# Patient Record
Sex: FEMALE | Race: OTHER | NOT HISPANIC OR LATINO
[De-identification: names, ages, dates, MRNs, and addresses within clinical notes are randomized per-mention and may not be internally consistent; named-entity substitution may affect disease eponyms.]

---

## 2021-08-09 PROBLEM — Z00.00 ENCOUNTER FOR PREVENTIVE HEALTH EXAMINATION: Status: ACTIVE | Noted: 2021-08-09

## 2021-08-17 ENCOUNTER — APPOINTMENT (OUTPATIENT)
Dept: NEUROLOGY | Facility: CLINIC | Age: 66
End: 2021-08-17

## 2022-01-07 ENCOUNTER — EMERGENCY (EMERGENCY)
Facility: HOSPITAL | Age: 67
LOS: 1 days | Discharge: ANOTHER HOSPITAL | End: 2022-01-07
Attending: EMERGENCY MEDICINE | Admitting: EMERGENCY MEDICINE
Payer: MEDICARE

## 2022-01-07 VITALS
TEMPERATURE: 98 F | DIASTOLIC BLOOD PRESSURE: 74 MMHG | HEART RATE: 74 BPM | HEIGHT: 64 IN | SYSTOLIC BLOOD PRESSURE: 120 MMHG | WEIGHT: 139.99 LBS | OXYGEN SATURATION: 94 % | RESPIRATION RATE: 16 BRPM

## 2022-01-07 DIAGNOSIS — R41.82 ALTERED MENTAL STATUS, UNSPECIFIED: ICD-10-CM

## 2022-01-07 DIAGNOSIS — G40.909 EPILEPSY, UNSPECIFIED, NOT INTRACTABLE, WITHOUT STATUS EPILEPTICUS: ICD-10-CM

## 2022-01-07 DIAGNOSIS — U07.1 COVID-19: ICD-10-CM

## 2022-01-07 DIAGNOSIS — Z88.0 ALLERGY STATUS TO PENICILLIN: ICD-10-CM

## 2022-01-07 LAB
ALBUMIN SERPL ELPH-MCNC: 3 G/DL — LOW (ref 3.4–5)
ALP SERPL-CCNC: 124 U/L — HIGH (ref 40–120)
ALT FLD-CCNC: 31 U/L — SIGNIFICANT CHANGE UP (ref 12–42)
ANION GAP SERPL CALC-SCNC: 8 MMOL/L — LOW (ref 9–16)
APPEARANCE UR: CLEAR — SIGNIFICANT CHANGE UP
AST SERPL-CCNC: 33 U/L — SIGNIFICANT CHANGE UP (ref 15–37)
BASOPHILS # BLD AUTO: 0.02 K/UL — SIGNIFICANT CHANGE UP (ref 0–0.2)
BASOPHILS NFR BLD AUTO: 0.3 % — SIGNIFICANT CHANGE UP (ref 0–2)
BILIRUB SERPL-MCNC: 0.3 MG/DL — SIGNIFICANT CHANGE UP (ref 0.2–1.2)
BILIRUB UR-MCNC: NEGATIVE — SIGNIFICANT CHANGE UP
BUN SERPL-MCNC: 8 MG/DL — SIGNIFICANT CHANGE UP (ref 7–23)
CALCIUM SERPL-MCNC: 8.6 MG/DL — SIGNIFICANT CHANGE UP (ref 8.5–10.5)
CHLORIDE SERPL-SCNC: 108 MMOL/L — SIGNIFICANT CHANGE UP (ref 96–108)
CO2 SERPL-SCNC: 26 MMOL/L — SIGNIFICANT CHANGE UP (ref 22–31)
COLOR SPEC: YELLOW — SIGNIFICANT CHANGE UP
CREAT SERPL-MCNC: 0.48 MG/DL — LOW (ref 0.5–1.3)
DIFF PNL FLD: NEGATIVE — SIGNIFICANT CHANGE UP
EOSINOPHIL # BLD AUTO: 0.12 K/UL — SIGNIFICANT CHANGE UP (ref 0–0.5)
EOSINOPHIL NFR BLD AUTO: 2.1 % — SIGNIFICANT CHANGE UP (ref 0–6)
GLUCOSE SERPL-MCNC: 90 MG/DL — SIGNIFICANT CHANGE UP (ref 70–99)
GLUCOSE UR QL: NEGATIVE — SIGNIFICANT CHANGE UP
HCT VFR BLD CALC: 38 % — SIGNIFICANT CHANGE UP (ref 34.5–45)
HGB BLD-MCNC: 12.5 G/DL — SIGNIFICANT CHANGE UP (ref 11.5–15.5)
IMM GRANULOCYTES NFR BLD AUTO: 0.5 % — SIGNIFICANT CHANGE UP (ref 0–1.5)
KETONES UR-MCNC: NEGATIVE — SIGNIFICANT CHANGE UP
LACTATE SERPL-SCNC: 0.8 MMOL/L — SIGNIFICANT CHANGE UP (ref 0.4–2)
LEUKOCYTE ESTERASE UR-ACNC: NEGATIVE — SIGNIFICANT CHANGE UP
LYMPHOCYTES # BLD AUTO: 1.92 K/UL — SIGNIFICANT CHANGE UP (ref 1–3.3)
LYMPHOCYTES # BLD AUTO: 33 % — SIGNIFICANT CHANGE UP (ref 13–44)
MCHC RBC-ENTMCNC: 32.9 GM/DL — SIGNIFICANT CHANGE UP (ref 32–36)
MCHC RBC-ENTMCNC: 33.3 PG — SIGNIFICANT CHANGE UP (ref 27–34)
MCV RBC AUTO: 101.3 FL — HIGH (ref 80–100)
MONOCYTES # BLD AUTO: 0.49 K/UL — SIGNIFICANT CHANGE UP (ref 0–0.9)
MONOCYTES NFR BLD AUTO: 8.4 % — SIGNIFICANT CHANGE UP (ref 2–14)
NEUTROPHILS # BLD AUTO: 3.24 K/UL — SIGNIFICANT CHANGE UP (ref 1.8–7.4)
NEUTROPHILS NFR BLD AUTO: 55.7 % — SIGNIFICANT CHANGE UP (ref 43–77)
NITRITE UR-MCNC: NEGATIVE — SIGNIFICANT CHANGE UP
NRBC # BLD: 0 /100 WBCS — SIGNIFICANT CHANGE UP (ref 0–0)
PH UR: 5.5 — SIGNIFICANT CHANGE UP (ref 5–8)
PLATELET # BLD AUTO: 289 K/UL — SIGNIFICANT CHANGE UP (ref 150–400)
POTASSIUM SERPL-MCNC: 3.9 MMOL/L — SIGNIFICANT CHANGE UP (ref 3.5–5.3)
POTASSIUM SERPL-SCNC: 3.9 MMOL/L — SIGNIFICANT CHANGE UP (ref 3.5–5.3)
PROT SERPL-MCNC: 6.5 G/DL — SIGNIFICANT CHANGE UP (ref 6.4–8.2)
PROT UR-MCNC: NEGATIVE MG/DL — SIGNIFICANT CHANGE UP
RBC # BLD: 3.75 M/UL — LOW (ref 3.8–5.2)
RBC # FLD: 12.4 % — SIGNIFICANT CHANGE UP (ref 10.3–14.5)
SARS-COV-2 RNA SPEC QL NAA+PROBE: DETECTED
SODIUM SERPL-SCNC: 142 MMOL/L — SIGNIFICANT CHANGE UP (ref 132–145)
SP GR SPEC: >=1.03 — SIGNIFICANT CHANGE UP (ref 1–1.03)
TROPONIN I, HIGH SENSITIVITY RESULT: 25.4 NG/L — SIGNIFICANT CHANGE UP
UROBILINOGEN FLD QL: 0.2 E.U./DL — SIGNIFICANT CHANGE UP
WBC # BLD: 5.82 K/UL — SIGNIFICANT CHANGE UP (ref 3.8–10.5)
WBC # FLD AUTO: 5.82 K/UL — SIGNIFICANT CHANGE UP (ref 3.8–10.5)

## 2022-01-07 PROCEDURE — 71045 X-RAY EXAM CHEST 1 VIEW: CPT | Mod: 26

## 2022-01-07 PROCEDURE — 93010 ELECTROCARDIOGRAM REPORT: CPT

## 2022-01-07 PROCEDURE — 99285 EMERGENCY DEPT VISIT HI MDM: CPT

## 2022-01-07 PROCEDURE — 70450 CT HEAD/BRAIN W/O DYE: CPT | Mod: 26

## 2022-01-07 RX ORDER — SODIUM CHLORIDE 9 MG/ML
1000 INJECTION INTRAMUSCULAR; INTRAVENOUS; SUBCUTANEOUS
Refills: 0 | Status: DISCONTINUED | OUTPATIENT
Start: 2022-01-07 | End: 2022-01-11

## 2022-01-07 RX ADMIN — SODIUM CHLORIDE 80 MILLILITER(S): 9 INJECTION INTRAMUSCULAR; INTRAVENOUS; SUBCUTANEOUS at 16:13

## 2022-01-07 NOTE — ED PROVIDER NOTE - NEUROLOGICAL, MLM
awake responds to name and follows commands appropriately to move exterminates 4/5 motor all extremities

## 2022-01-07 NOTE — ED ADULT TRIAGE NOTE - CHIEF COMPLAINT QUOTE
BIBA c/o 2 seizures, approx 10-20 seconds. +pt is currently altered, as per son, pt more lethargic the past 6-7 hours. recently d/c from [place] on 12/24-1/4 for UTI. FS 98 in the field

## 2022-01-07 NOTE — ED ADULT NURSE NOTE - CHIEF COMPLAINT QUOTE
BIBA c/o 2 seizures, approx 10-20 seconds. +pt is currently altered, as per son, pt more lethargic the past 6-7 hours. recently d/c from [place] on 12/24-1/4 for UTI. FS 98 in the field

## 2022-01-07 NOTE — ED PROVIDER NOTE - OBJECTIVE STATEMENT
Triage Chief complaint: seizure   Nurse triage notes: BIBA c/o 2 seizures, approx 10-20 seconds. +pt is currently altered, as per son, pt more lethargic the past 6-7 hours. recently d/c from [place] on 12/24-1/4 for UTI. FS 98 in the field  Individuals Present- Dr. Grier (ED Attending), Pt  Vital Signs: HR: 74, BP: 120/74, Respiratory Rate in minutes: 16, Temp(F): 98.3, SpO2: 94%   _____________________________________________________ Triage Chief complaint: seizure   Nurse triage notes: BIBA c/o 2 seizures, approx 10-20 seconds. +pt is currently altered, as per son, pt more lethargic the past 6-7 hours. recently d/c from [place] on 12/24-1/4 for UTI. FS 98 in the field  Individuals Present- Dr. Grier (ED Attending), Pt  Vital Signs: HR: 74, BP: 120/74, Respiratory Rate in minutes: 16, Temp(F): 98.3, SpO2: 94%   -  history as per patient and ciera walsh  (coordinator at home where pt lives)    67 yo woman with a history of seizure disorder - who lives in an assisted living facility. according to ms walsh - pt has 2 seizure last evening and seizure this morning - promting the patient to be tranferred to [facility] for evaluation. pt is well known the [facility] health system - her epileptologist at [facility] is Sheila Jeter .    pt was admitted to [place] from 12/25 - 1/4/22 for a uti and apparently dc's home without proper seizure meds - xcopri 250mg /day clobazam 10 mg qam and 15 q pm   _____________________________________________________ Triage Chief complaint: seizure   Nurse triage notes: BIBA c/o 2 seizures, approx 10-20 seconds. +pt is currently altered, as per son, pt more lethargic the past 6-7 hours. recently d/c from [place] on 12/24-1/4 for UTI. FS 98 in the field  Individuals Present- Dr. Grier (ED Attending), Pt  Vital Signs: HR: 74, BP: 120/74, Respiratory Rate in minutes: 16, Temp(F): 98.3, SpO2: 94%   -  history as per patient and ciera walsh  (coordinator at home where pt lives)    65 yo woman with a history of seizure disorder - who lives in an assisted living facility. according to ms walsh - pt has 2 seizures last evening and seizure this morning - promting the patient to be tranferred to [facility] for evaluation. pt is well known the [facility] health system - her epileptologist at [facility] is hSeila Jeter .    pt was admitted to [place] from 12/25 - 1/4/22 for a uti and apparently dc's home without proper seizure meds - xcopri 250mg /day clobazam 10 mg qam and 15 q pm   _____________________________________________________

## 2022-01-07 NOTE — ED ADULT NURSE REASSESSMENT NOTE - NS ED NURSE REASSESS COMMENT FT1
Pt received from previous shift RN. Pt currently is A&Ox3. Was able to state name, date, location. Pt stated why she presented to the hospital.  Is aware that she is going to be transferred to the hospital. BEFAST negative.

## 2022-01-07 NOTE — ED PROVIDER NOTE - CLINICAL SUMMARY MEDICAL DECISION MAKING FREE TEXT BOX
pt remained stable throughout ed stay   pt accepted to [facility] epilepsy service by dr reilly

## 2022-01-07 NOTE — ED ADULT NURSE REASSESSMENT NOTE - NS ED NURSE REASSESS COMMENT FT1
pt informed she has covid and needs admission to another facility- pt verbalizes understanding, safety precautions in place

## 2022-01-07 NOTE — ED ADULT NURSE NOTE - OBJECTIVE STATEMENT
67 y/o female who arrives from home with a possible positive home COVID test- pt arrives altered, awake and alert, weak and unwitnessed seizure. rectal temp 99.4 pt placed on cardiac monitor- 2 IV SL in place with blood sent - pt vaccinated with moderna x 2 May 2021

## 2022-01-07 NOTE — ED PROVIDER NOTE - CARE PLAN
Principal Discharge DX:	Seizure disorder  Secondary Diagnosis:	2019 novel coronavirus disease (COVID-19)   1

## 2022-01-08 VITALS
TEMPERATURE: 98 F | SYSTOLIC BLOOD PRESSURE: 121 MMHG | RESPIRATION RATE: 16 BRPM | HEART RATE: 54 BPM | DIASTOLIC BLOOD PRESSURE: 70 MMHG | OXYGEN SATURATION: 98 %

## 2022-01-08 LAB
CULTURE RESULTS: SIGNIFICANT CHANGE UP
SPECIMEN SOURCE: SIGNIFICANT CHANGE UP

## 2022-01-13 LAB
CULTURE RESULTS: SIGNIFICANT CHANGE UP
CULTURE RESULTS: SIGNIFICANT CHANGE UP
SPECIMEN SOURCE: SIGNIFICANT CHANGE UP
SPECIMEN SOURCE: SIGNIFICANT CHANGE UP

## 2022-01-28 VITALS
RESPIRATION RATE: 16 BRPM | HEIGHT: 64 IN | DIASTOLIC BLOOD PRESSURE: 54 MMHG | HEART RATE: 54 BPM | SYSTOLIC BLOOD PRESSURE: 96 MMHG | OXYGEN SATURATION: 93 % | TEMPERATURE: 97 F

## 2022-01-28 LAB
ALBUMIN SERPL ELPH-MCNC: 3.3 G/DL — LOW (ref 3.4–5)
ALP SERPL-CCNC: 125 U/L — HIGH (ref 40–120)
ALT FLD-CCNC: 19 U/L — SIGNIFICANT CHANGE UP (ref 12–42)
ANION GAP SERPL CALC-SCNC: 8 MMOL/L — LOW (ref 9–16)
APPEARANCE UR: CLEAR — SIGNIFICANT CHANGE UP
APTT BLD: 31.6 SEC — SIGNIFICANT CHANGE UP (ref 27.5–35.5)
AST SERPL-CCNC: 30 U/L — SIGNIFICANT CHANGE UP (ref 15–37)
BASOPHILS # BLD AUTO: 0.03 K/UL — SIGNIFICANT CHANGE UP (ref 0–0.2)
BASOPHILS NFR BLD AUTO: 0.6 % — SIGNIFICANT CHANGE UP (ref 0–2)
BILIRUB SERPL-MCNC: 0.1 MG/DL — LOW (ref 0.2–1.2)
BILIRUB UR-MCNC: ABNORMAL
BUN SERPL-MCNC: 27 MG/DL — HIGH (ref 7–23)
CALCIUM SERPL-MCNC: 8.8 MG/DL — SIGNIFICANT CHANGE UP (ref 8.5–10.5)
CHLORIDE SERPL-SCNC: 109 MMOL/L — HIGH (ref 96–108)
CK SERPL-CCNC: 27 U/L — SIGNIFICANT CHANGE UP (ref 26–192)
CO2 SERPL-SCNC: 26 MMOL/L — SIGNIFICANT CHANGE UP (ref 22–31)
COLOR SPEC: YELLOW — SIGNIFICANT CHANGE UP
CREAT SERPL-MCNC: 0.57 MG/DL — SIGNIFICANT CHANGE UP (ref 0.5–1.3)
DIFF PNL FLD: NEGATIVE — SIGNIFICANT CHANGE UP
EOSINOPHIL # BLD AUTO: 0.5 K/UL — SIGNIFICANT CHANGE UP (ref 0–0.5)
EOSINOPHIL NFR BLD AUTO: 9.7 % — HIGH (ref 0–6)
ETHANOL SERPL-MCNC: <3 MG/DL — SIGNIFICANT CHANGE UP
GLUCOSE SERPL-MCNC: 89 MG/DL — SIGNIFICANT CHANGE UP (ref 70–99)
GLUCOSE UR QL: NEGATIVE — SIGNIFICANT CHANGE UP
HCT VFR BLD CALC: 41.6 % — SIGNIFICANT CHANGE UP (ref 34.5–45)
HGB BLD-MCNC: 13.4 G/DL — SIGNIFICANT CHANGE UP (ref 11.5–15.5)
IMM GRANULOCYTES NFR BLD AUTO: 0.4 % — SIGNIFICANT CHANGE UP (ref 0–1.5)
INR BLD: 1.06 — SIGNIFICANT CHANGE UP (ref 0.88–1.16)
KETONES UR-MCNC: ABNORMAL MG/DL
LACTATE SERPL-SCNC: 0.6 MMOL/L — SIGNIFICANT CHANGE UP (ref 0.4–2)
LEUKOCYTE ESTERASE UR-ACNC: NEGATIVE — SIGNIFICANT CHANGE UP
LYMPHOCYTES # BLD AUTO: 1.92 K/UL — SIGNIFICANT CHANGE UP (ref 1–3.3)
LYMPHOCYTES # BLD AUTO: 37.3 % — SIGNIFICANT CHANGE UP (ref 13–44)
MAGNESIUM SERPL-MCNC: 2.2 MG/DL — SIGNIFICANT CHANGE UP (ref 1.6–2.6)
MCHC RBC-ENTMCNC: 32.2 GM/DL — SIGNIFICANT CHANGE UP (ref 32–36)
MCHC RBC-ENTMCNC: 32.8 PG — SIGNIFICANT CHANGE UP (ref 27–34)
MCV RBC AUTO: 102 FL — HIGH (ref 80–100)
MONOCYTES # BLD AUTO: 0.52 K/UL — SIGNIFICANT CHANGE UP (ref 0–0.9)
MONOCYTES NFR BLD AUTO: 10.1 % — SIGNIFICANT CHANGE UP (ref 2–14)
NEUTROPHILS # BLD AUTO: 2.16 K/UL — SIGNIFICANT CHANGE UP (ref 1.8–7.4)
NEUTROPHILS NFR BLD AUTO: 41.9 % — LOW (ref 43–77)
NITRITE UR-MCNC: NEGATIVE — SIGNIFICANT CHANGE UP
NRBC # BLD: 0 /100 WBCS — SIGNIFICANT CHANGE UP (ref 0–0)
PH UR: 5 — SIGNIFICANT CHANGE UP (ref 5–8)
PLATELET # BLD AUTO: 167 K/UL — SIGNIFICANT CHANGE UP (ref 150–400)
POTASSIUM SERPL-MCNC: 4.2 MMOL/L — SIGNIFICANT CHANGE UP (ref 3.5–5.3)
POTASSIUM SERPL-SCNC: 4.2 MMOL/L — SIGNIFICANT CHANGE UP (ref 3.5–5.3)
PROT SERPL-MCNC: 6.9 G/DL — SIGNIFICANT CHANGE UP (ref 6.4–8.2)
PROT UR-MCNC: NEGATIVE MG/DL — SIGNIFICANT CHANGE UP
PROTHROM AB SERPL-ACNC: 12.7 SEC — SIGNIFICANT CHANGE UP (ref 10.6–13.6)
RBC # BLD: 4.08 M/UL — SIGNIFICANT CHANGE UP (ref 3.8–5.2)
RBC # FLD: 13.2 % — SIGNIFICANT CHANGE UP (ref 10.3–14.5)
SARS-COV-2 RNA SPEC QL NAA+PROBE: DETECTED
SODIUM SERPL-SCNC: 143 MMOL/L — SIGNIFICANT CHANGE UP (ref 132–145)
SP GR SPEC: 1.02 — SIGNIFICANT CHANGE UP (ref 1–1.03)
UROBILINOGEN FLD QL: 0.2 E.U./DL — SIGNIFICANT CHANGE UP
WBC # BLD: 5.15 K/UL — SIGNIFICANT CHANGE UP (ref 3.8–10.5)
WBC # FLD AUTO: 5.15 K/UL — SIGNIFICANT CHANGE UP (ref 3.8–10.5)

## 2022-01-28 PROCEDURE — 74177 CT ABD & PELVIS W/CONTRAST: CPT | Mod: 26,MH

## 2022-01-28 PROCEDURE — 71045 X-RAY EXAM CHEST 1 VIEW: CPT | Mod: 26

## 2022-01-28 PROCEDURE — 70450 CT HEAD/BRAIN W/O DYE: CPT | Mod: 26,MH

## 2022-01-28 RX ORDER — METOCLOPRAMIDE HCL 10 MG
10 TABLET ORAL ONCE
Refills: 0 | Status: COMPLETED | OUTPATIENT
Start: 2022-01-28 | End: 2022-01-28

## 2022-01-28 RX ORDER — SODIUM CHLORIDE 9 MG/ML
1000 INJECTION INTRAMUSCULAR; INTRAVENOUS; SUBCUTANEOUS ONCE
Refills: 0 | Status: COMPLETED | OUTPATIENT
Start: 2022-01-28 | End: 2022-01-28

## 2022-01-28 RX ORDER — ACETAMINOPHEN 500 MG
650 TABLET ORAL ONCE
Refills: 0 | Status: COMPLETED | OUTPATIENT
Start: 2022-01-28 | End: 2022-01-28

## 2022-01-28 RX ADMIN — Medication 650 MILLIGRAM(S): at 21:04

## 2022-01-28 RX ADMIN — SODIUM CHLORIDE 1000 MILLILITER(S): 9 INJECTION INTRAMUSCULAR; INTRAVENOUS; SUBCUTANEOUS at 19:57

## 2022-01-28 RX ADMIN — SODIUM CHLORIDE 1000 MILLILITER(S): 9 INJECTION INTRAMUSCULAR; INTRAVENOUS; SUBCUTANEOUS at 20:00

## 2022-01-28 RX ADMIN — Medication 10 MILLIGRAM(S): at 21:04

## 2022-01-28 NOTE — ED PROVIDER NOTE - UNABLE TO OBTAIN
Severe Illness/Injury History and ROS limited 2/2 inability to cooperate with remainder of history due to clinical condition/AMS.

## 2022-01-28 NOTE — ED PROVIDER NOTE - OBJECTIVE STATEMENT
65 y/o female with PMHx of epilepsy and chronic UTI as per old chart as Patient is a poor historian. Will attempt to obtain more collateral later. Patient presenting from home for 3 days of worsening confusion, headache, and generalized weakness. Some information obtained from triage note. States Patient is bed ridden and sating 93% on RA. Otherwise, history is very limited. 65 y/o female with PMHx of epilepsy and chronic UTI as per old chart as Patient is a poor historian. Will attempt to obtain more collateral later. Patient presenting from home for 3 days of worsening headache episodes, and generalized weakness. Some information obtained from triage note. States Patient has been bed ridden for the last 4-5 days (usual state is walking w aid of a walker).  Otherwise, history is very limited.

## 2022-01-28 NOTE — ED ADULT NURSE NOTE - OBJECTIVE STATEMENT
pt a&ox1 BIBA from independent living with 24H home care, c/o 3 days of worsening confusion, AMS, lethargy as per HHA. PMH chronic UTI, epilepsy. bed ridden at baseline. will continue to monitor.

## 2022-01-28 NOTE — ED PROVIDER NOTE - CONSTITUTIONAL, MLM
normal... Patient is somnolent but responds to verbal stimuli. Answers questions but in low voice. Appears acutely ill. A&O x 2.

## 2022-01-28 NOTE — ED PROVIDER NOTE - CLINICAL SUMMARY MEDICAL DECISION MAKING FREE TEXT BOX
Will do AMS workup. Exam is nonfocal. Limited history. Will attempt to obtain additional collateral later. Will follow closely. Will do weakness workup. Exam is nonfocal. Limited history. Will attempt to obtain additional collateral later. Will follow closely.

## 2022-01-28 NOTE — ED ADULT TRIAGE NOTE - CHIEF COMPLAINT QUOTE
Pt BIBA from home c/o x3 days of worsening confusion, HA and lethargy according to EMS as per HHA.  Pt is confused at baseline.  No obvious signs of deformity or injury.  No facial droop.  Pt is bed-ridden at baseline.  Pt 93% on RA, improves to 98% on 2LNC.

## 2022-01-28 NOTE — ED PROVIDER NOTE - PROGRESS NOTE DETAILS
Steve Ramon () [phone]  Neurologist: Sheila Jeter ([facility])  Brother: Rafael Crow [phone] Got more information from  and it seems that pt has been progressively weaker in the last 4-5 days. no breakthrough seizures. weak and with poor apetite. Concern that her usual state is that she walks w the aid of a walker and now is unable to perform any ADLs and has been bedridden all week. Spoke to Neuro team at [facility] and since pt is not having any seizure symptoms, state there/s no need for admission at [facility]. Upon reassessment of pt she complained of difuse abd pain. Added CT abd for completion of work up for likely admission to [facility]. Pending CT abd for final dispo. CXR - no effusion, no consolidation Signed out to me by Dr Mcdaniel pending CT abdomen, will admit afterwards. CT abdomen shows fecal impaction throughout colon. d/w Dr. Hamilton cardiology for possible symptomatic bradycardia, as per cards, does not appear to be primary. d/w Dr. Wilkins tele stepdown, [facility], d/w Dr. Merchant who accepted patient for admission rectal exam performed with WAN Fernandez present at bedside. no stool in vault to disimpact.

## 2022-01-28 NOTE — ED ADULT NURSE REASSESSMENT NOTE - NSIMPLEMENTINTERV_GEN_ALL_ED
Implemented All Fall Risk Interventions:  [place] to call system. Call bell, personal items and telephone within reach. Instruct patient to call for assistance. Room bathroom lighting operational. Non-slip footwear when patient is off stretcher. Physically safe environment: no spills, clutter or unnecessary equipment. Stretcher in lowest position, wheels locked, appropriate side rails in place. Provide visual cue, wrist band, yellow gown, etc. Monitor gait and stability. Monitor for mental status changes and reorient to person, place, and time. Review medications for side effects contributing to fall risk. Reinforce activity limits and safety measures with patient and family.

## 2022-01-28 NOTE — ED ADULT NURSE NOTE - NSIMPLEMENTINTERV_GEN_ALL_ED
Implemented All Fall Risk Interventions:  [place] to call system. Call bell, personal items and telephone within reach. Instruct patient to call for assistance. Room bathroom lighting operational. Non-slip footwear when patient is off stretcher. Physically safe environment: no spills, clutter or unnecessary equipment. Stretcher in lowest position, wheels locked, appropriate side rails in place. Provide visual cue, wrist band, yellow gown, etc. Monitor gait and stability. Monitor for mental status changes and reorient to person, place, and time. Review medications for side effects contributing to fall risk. Reinforce activity limits and safety measures with patient and family.

## 2022-01-29 ENCOUNTER — INPATIENT (INPATIENT)
Facility: HOSPITAL | Age: 67
LOS: 2 days | Discharge: HOME HEALTH | DRG: 391 | End: 2022-02-01
Attending: INTERNAL MEDICINE | Admitting: HOSPITALIST
Payer: MEDICARE

## 2022-01-29 DIAGNOSIS — R00.1 BRADYCARDIA, UNSPECIFIED: ICD-10-CM

## 2022-01-29 DIAGNOSIS — N39.0 URINARY TRACT INFECTION, SITE NOT SPECIFIED: ICD-10-CM

## 2022-01-29 DIAGNOSIS — U07.1 COVID-19: ICD-10-CM

## 2022-01-29 DIAGNOSIS — K56.41 FECAL IMPACTION: ICD-10-CM

## 2022-01-29 DIAGNOSIS — G40.909 EPILEPSY, UNSPECIFIED, NOT INTRACTABLE, WITHOUT STATUS EPILEPTICUS: ICD-10-CM

## 2022-01-29 DIAGNOSIS — R53.1 WEAKNESS: ICD-10-CM

## 2022-01-29 DIAGNOSIS — Z29.9 ENCOUNTER FOR PROPHYLACTIC MEASURES, UNSPECIFIED: ICD-10-CM

## 2022-01-29 LAB
ALBUMIN SERPL ELPH-MCNC: 3.5 G/DL — SIGNIFICANT CHANGE UP (ref 3.3–5)
ALP SERPL-CCNC: 120 U/L — SIGNIFICANT CHANGE UP (ref 40–120)
ALT FLD-CCNC: 10 U/L — SIGNIFICANT CHANGE UP (ref 10–45)
ANION GAP SERPL CALC-SCNC: 10 MMOL/L — SIGNIFICANT CHANGE UP (ref 5–17)
AST SERPL-CCNC: 17 U/L — SIGNIFICANT CHANGE UP (ref 10–40)
BASOPHILS # BLD AUTO: 0.03 K/UL — SIGNIFICANT CHANGE UP (ref 0–0.2)
BASOPHILS NFR BLD AUTO: 0.9 % — SIGNIFICANT CHANGE UP (ref 0–2)
BILIRUB SERPL-MCNC: <0.2 MG/DL — SIGNIFICANT CHANGE UP (ref 0.2–1.2)
BUN SERPL-MCNC: 14 MG/DL — SIGNIFICANT CHANGE UP (ref 7–23)
CALCIUM SERPL-MCNC: 8.2 MG/DL — LOW (ref 8.4–10.5)
CHLORIDE SERPL-SCNC: 111 MMOL/L — HIGH (ref 96–108)
CO2 SERPL-SCNC: 22 MMOL/L — SIGNIFICANT CHANGE UP (ref 22–31)
CREAT SERPL-MCNC: 0.5 MG/DL — SIGNIFICANT CHANGE UP (ref 0.5–1.3)
CULTURE RESULTS: NO GROWTH — SIGNIFICANT CHANGE UP
EOSINOPHIL # BLD AUTO: 0.34 K/UL — SIGNIFICANT CHANGE UP (ref 0–0.5)
EOSINOPHIL NFR BLD AUTO: 10 % — HIGH (ref 0–6)
GLUCOSE SERPL-MCNC: 87 MG/DL — SIGNIFICANT CHANGE UP (ref 70–99)
HCT VFR BLD CALC: 39.1 % — SIGNIFICANT CHANGE UP (ref 34.5–45)
HGB BLD-MCNC: 12.4 G/DL — SIGNIFICANT CHANGE UP (ref 11.5–15.5)
IMM GRANULOCYTES NFR BLD AUTO: 0.3 % — SIGNIFICANT CHANGE UP (ref 0–1.5)
LYMPHOCYTES # BLD AUTO: 1.11 K/UL — SIGNIFICANT CHANGE UP (ref 1–3.3)
LYMPHOCYTES # BLD AUTO: 32.6 % — SIGNIFICANT CHANGE UP (ref 13–44)
MAGNESIUM SERPL-MCNC: 1.8 MG/DL — SIGNIFICANT CHANGE UP (ref 1.6–2.6)
MCHC RBC-ENTMCNC: 31.7 GM/DL — LOW (ref 32–36)
MCHC RBC-ENTMCNC: 32.9 PG — SIGNIFICANT CHANGE UP (ref 27–34)
MCV RBC AUTO: 103.7 FL — HIGH (ref 80–100)
MONOCYTES # BLD AUTO: 0.31 K/UL — SIGNIFICANT CHANGE UP (ref 0–0.9)
MONOCYTES NFR BLD AUTO: 9.1 % — SIGNIFICANT CHANGE UP (ref 2–14)
NEUTROPHILS # BLD AUTO: 1.61 K/UL — LOW (ref 1.8–7.4)
NEUTROPHILS NFR BLD AUTO: 47.1 % — SIGNIFICANT CHANGE UP (ref 43–77)
NRBC # BLD: 0 /100 WBCS — SIGNIFICANT CHANGE UP (ref 0–0)
PHOSPHATE SERPL-MCNC: 3.2 MG/DL — SIGNIFICANT CHANGE UP (ref 2.5–4.5)
PLATELET # BLD AUTO: 147 K/UL — LOW (ref 150–400)
POTASSIUM SERPL-MCNC: 4.2 MMOL/L — SIGNIFICANT CHANGE UP (ref 3.5–5.3)
POTASSIUM SERPL-SCNC: 4.2 MMOL/L — SIGNIFICANT CHANGE UP (ref 3.5–5.3)
PROT SERPL-MCNC: 5.9 G/DL — LOW (ref 6–8.3)
RBC # BLD: 3.77 M/UL — LOW (ref 3.8–5.2)
RBC # FLD: 13.1 % — SIGNIFICANT CHANGE UP (ref 10.3–14.5)
SODIUM SERPL-SCNC: 143 MMOL/L — SIGNIFICANT CHANGE UP (ref 135–145)
SPECIMEN SOURCE: SIGNIFICANT CHANGE UP
TROPONIN I, HIGH SENSITIVITY RESULT: 10 NG/L — SIGNIFICANT CHANGE UP
WBC # BLD: 3.41 K/UL — LOW (ref 3.8–10.5)
WBC # FLD AUTO: 3.41 K/UL — LOW (ref 3.8–10.5)

## 2022-01-29 PROCEDURE — 93010 ELECTROCARDIOGRAM REPORT: CPT

## 2022-01-29 PROCEDURE — 99285 EMERGENCY DEPT VISIT HI MDM: CPT

## 2022-01-29 PROCEDURE — 99053 MED SERV 10PM-8AM 24 HR FAC: CPT

## 2022-01-29 PROCEDURE — 99223 1ST HOSP IP/OBS HIGH 75: CPT | Mod: GC

## 2022-01-29 RX ORDER — CENOBAMATE 350 MG/DAY
1 KIT ORAL
Qty: 0 | Refills: 0 | DISCHARGE

## 2022-01-29 RX ORDER — THIAMINE MONONITRATE (VIT B1) 100 MG
100 TABLET ORAL DAILY
Refills: 0 | Status: DISCONTINUED | OUTPATIENT
Start: 2022-01-29 | End: 2022-02-01

## 2022-01-29 RX ORDER — NITROFURANTOIN MACROCRYSTAL 50 MG
50 CAPSULE ORAL
Refills: 0 | Status: DISCONTINUED | OUTPATIENT
Start: 2022-01-29 | End: 2022-01-29

## 2022-01-29 RX ORDER — ROSUVASTATIN CALCIUM 5 MG/1
1 TABLET ORAL
Qty: 0 | Refills: 0 | DISCHARGE

## 2022-01-29 RX ORDER — MULTIVIT WITH MIN/MFOLATE/K2 340-15/3 G
1 POWDER (GRAM) ORAL ONCE
Refills: 0 | Status: COMPLETED | OUTPATIENT
Start: 2022-01-29 | End: 2022-01-31

## 2022-01-29 RX ORDER — CLOBAZAM 10 MG/1
1 TABLET ORAL
Qty: 0 | Refills: 0 | DISCHARGE

## 2022-01-29 RX ORDER — LACTULOSE 10 G/15ML
15 SOLUTION ORAL ONCE
Refills: 0 | Status: COMPLETED | OUTPATIENT
Start: 2022-01-29 | End: 2022-01-29

## 2022-01-29 RX ORDER — CLOBAZAM 10 MG/1
10 TABLET ORAL
Refills: 0 | Status: DISCONTINUED | OUTPATIENT
Start: 2022-01-29 | End: 2022-01-29

## 2022-01-29 RX ORDER — NITROFURANTOIN MACROCRYSTAL 50 MG
1 CAPSULE ORAL
Qty: 0 | Refills: 0 | DISCHARGE

## 2022-01-29 RX ORDER — MAGNESIUM SULFATE 500 MG/ML
1 VIAL (ML) INJECTION ONCE
Refills: 0 | Status: COMPLETED | OUTPATIENT
Start: 2022-01-29 | End: 2022-01-29

## 2022-01-29 RX ORDER — SENNA PLUS 8.6 MG/1
2 TABLET ORAL AT BEDTIME
Refills: 0 | Status: DISCONTINUED | OUTPATIENT
Start: 2022-01-29 | End: 2022-02-01

## 2022-01-29 RX ORDER — ACETAMINOPHEN 500 MG
650 TABLET ORAL EVERY 6 HOURS
Refills: 0 | Status: DISCONTINUED | OUTPATIENT
Start: 2022-01-29 | End: 2022-02-01

## 2022-01-29 RX ORDER — ATORVASTATIN CALCIUM 80 MG/1
80 TABLET, FILM COATED ORAL AT BEDTIME
Refills: 0 | Status: DISCONTINUED | OUTPATIENT
Start: 2022-01-29 | End: 2022-01-29

## 2022-01-29 RX ORDER — POLYETHYLENE GLYCOL 3350 17 G/17G
17 POWDER, FOR SOLUTION ORAL EVERY 24 HOURS
Refills: 0 | Status: DISCONTINUED | OUTPATIENT
Start: 2022-01-29 | End: 2022-02-01

## 2022-01-29 RX ORDER — LISINOPRIL 2.5 MG/1
1 TABLET ORAL
Qty: 0 | Refills: 0 | DISCHARGE

## 2022-01-29 RX ORDER — ENOXAPARIN SODIUM 100 MG/ML
40 INJECTION SUBCUTANEOUS EVERY 24 HOURS
Refills: 0 | Status: DISCONTINUED | OUTPATIENT
Start: 2022-01-29 | End: 2022-02-01

## 2022-01-29 RX ORDER — LANOLIN ALCOHOL/MO/W.PET/CERES
3 CREAM (GRAM) TOPICAL AT BEDTIME
Refills: 0 | Status: DISCONTINUED | OUTPATIENT
Start: 2022-01-29 | End: 2022-02-01

## 2022-01-29 RX ORDER — CLOBAZAM 10 MG/1
5 TABLET ORAL EVERY 24 HOURS
Refills: 0 | Status: DISCONTINUED | OUTPATIENT
Start: 2022-01-29 | End: 2022-02-01

## 2022-01-29 RX ORDER — NITROFURANTOIN MACROCRYSTAL 50 MG
50 CAPSULE ORAL EVERY 24 HOURS
Refills: 0 | Status: DISCONTINUED | OUTPATIENT
Start: 2022-01-29 | End: 2022-02-01

## 2022-01-29 RX ORDER — PREGABALIN 225 MG/1
1 CAPSULE ORAL
Qty: 0 | Refills: 0 | DISCHARGE

## 2022-01-29 RX ORDER — CLOBAZAM 10 MG/1
10 TABLET ORAL EVERY 24 HOURS
Refills: 0 | Status: DISCONTINUED | OUTPATIENT
Start: 2022-01-30 | End: 2022-02-01

## 2022-01-29 RX ORDER — PREGABALIN 225 MG/1
1000 CAPSULE ORAL DAILY
Refills: 0 | Status: DISCONTINUED | OUTPATIENT
Start: 2022-01-29 | End: 2022-02-01

## 2022-01-29 RX ORDER — CLOBAZAM 10 MG/1
3 TABLET ORAL
Qty: 0 | Refills: 0 | DISCHARGE

## 2022-01-29 RX ORDER — ATORVASTATIN CALCIUM 80 MG/1
20 TABLET, FILM COATED ORAL AT BEDTIME
Refills: 0 | Status: DISCONTINUED | OUTPATIENT
Start: 2022-01-29 | End: 2022-02-01

## 2022-01-29 RX ORDER — THIAMINE MONONITRATE (VIT B1) 100 MG
1 TABLET ORAL
Qty: 0 | Refills: 0 | DISCHARGE

## 2022-01-29 RX ADMIN — Medication 10 MILLIGRAM(S): at 16:08

## 2022-01-29 RX ADMIN — LACTULOSE 15 GRAM(S): 10 SOLUTION ORAL at 09:03

## 2022-01-29 RX ADMIN — ENOXAPARIN SODIUM 40 MILLIGRAM(S): 100 INJECTION SUBCUTANEOUS at 09:36

## 2022-01-29 RX ADMIN — Medication 50 MILLIGRAM(S): at 23:36

## 2022-01-29 RX ADMIN — Medication 100 GRAM(S): at 19:40

## 2022-01-29 RX ADMIN — ATORVASTATIN CALCIUM 20 MILLIGRAM(S): 80 TABLET, FILM COATED ORAL at 23:36

## 2022-01-29 RX ADMIN — POLYETHYLENE GLYCOL 3350 17 GRAM(S): 17 POWDER, FOR SOLUTION ORAL at 12:30

## 2022-01-29 RX ADMIN — CLOBAZAM 10 MILLIGRAM(S): 10 TABLET ORAL at 11:05

## 2022-01-29 RX ADMIN — Medication 100 MILLIGRAM(S): at 16:08

## 2022-01-29 RX ADMIN — SENNA PLUS 2 TABLET(S): 8.6 TABLET ORAL at 23:36

## 2022-01-29 RX ADMIN — CLOBAZAM 5 MILLIGRAM(S): 10 TABLET ORAL at 19:40

## 2022-01-29 NOTE — H&P ADULT - NSHPLABSRESULTS_GEN_ALL_CORE
.  LABS:                         13.4   5.15  )-----------( 167      ( 2022 17:59 )             41.6         143  |  109<H>  |  27<H>  ----------------------------<  89  4.2   |  26  |  0.57    Ca    8.8      2022 17:59  Mg     2.2         TPro  6.9  /  Alb  3.3<L>  /  TBili  0.1<L>  /  DBili  x   /  AST  30  /  ALT  19  /  AlkPhos  125<H>      PT/INR - ( 2022 17:59 )   PT: 12.7 sec;   INR: 1.06          PTT - ( 2022 17:59 )  PTT:31.6 sec  Urinalysis Basic - ( 2022 18:00 )    Color: Yellow / Appearance: Clear / S.025 / pH: x  Gluc: x / Ketone: Trace mg/dL  / Bili: Small / Urobili: 0.2 E.U./dL   Blood: x / Protein: NEGATIVE mg/dL / Nitrite: NEGATIVE   Leuk Esterase: NEGATIVE / RBC: x / WBC x   Sq Epi: x / Non Sq Epi: x / Bacteria: x      CARDIAC MARKERS ( 2022 17:59 )  x     / x     / 27 U/L / x     / x            Lactate, Blood: 0.6 mmoL/L ( @ 17:59)      RADIOLOGY, EKG & ADDITIONAL TESTS: Reviewed.

## 2022-01-29 NOTE — H&P ADULT - PROBLEM SELECTOR PLAN 6
F: s/p 2L NS  E: Replete PRN'  N: pending bedside dysphagia  DVT PPx: Lovenox  GI PPx: None    Dispo: [facility]

## 2022-01-29 NOTE — H&P ADULT - ASSESSMENT
66F with developmental delay with PMHx of epilepsy (Lennox Gastaut sydnrome?) and chronic UTI as per old chart (unable to obtain further history as both she and her son are poor historians) presents to the ED via EMS for weakness, abdominal pain and headache found to be bradycardic and fecally impacted.

## 2022-01-29 NOTE — PROGRESS NOTE ADULT - SUBJECTIVE AND OBJECTIVE BOX
Additional collateral: hx of epilepsy but significant decline over the last 1.5 years, but was looking better than ever 1 week ago (per Sheila Jeter neurologist at [facility]). Last few days w/ significant weakness, HA, back ache the last few days, also now less alert and more confused. She has had multiple episodes of decreased alertness and weakness, had been attributed to seizures/UTIs in past. Apparently patient has history of bradycardia per ED doc who spoke to someone at [facility]. Has 24 hour home care. Was prescribed prophylactic nitrofurantoin for UTIs Additional collateral: hx of epilepsy but significant decline over the last 1.5 years, but was looking better than ever 1 week ago (per Sheila Jeter neurologist at [facility]). Last few days w/ significant weakness, HA, back ache the last few days, also now less alert and more confused. She has had multiple episodes of decreased alertness and weakness, had been attributed to seizures/UTIs in past. Apparently patient has history of bradycardia per ED doc who spoke to someone at [facility]. Has 24 hour home care. Was prescribed prophylactic nitrofurantoin for UTIs    INTERVAL HPI/OVERNIGHT EVENTS:  Patient was seen and examined at bedside. Patient says she feels a little better but still weak. mentions recent     VITAL SIGNS:  T(F): 97.5 (22 @ 04:18)  HR: 49 (22 @ 04:18)  BP: 133/67 (22 @ 04:18)  RR: 18 (22 @ 04:18)  SpO2: 100% (22 @ 04:18)  Wt(kg): --    PHYSICAL EXAM:  Constitutional: Lethargic, speaking slowly in broken sentences  Head: NC/AT  Eyes: PERRL, EOMI, anicteric sclera  ENT: no nasal discharge; uvula midline, no oropharyngeal erythema or exudates; MM dry  Neck: supple; no JVD or thyromegaly  Respiratory: CTA B/L; no W/R/R, no retractions  Cardiac: +S1/S2; RRR, bradycardic   Gastrointestinal: abdomen distended; TTP diffusely, suprapubic fullness, +BSx4  Back: spine midline, no bony tenderness or step-offs; no CVAT B/L  Extremities: cool extremities, no edema  Musculoskeletal: NROM x4; no joint swelling, tenderness or erythema  Vascular: 2+ radial, femoral, DP/PT pulses B/L  Neurologic: AAOx2 (to self and year); CNII-XII grossly intact; no focal deficits  Psychiatric: Patient talks very slow and intermittently does not make sense    MEDICATIONS  (STANDING):  atorvastatin 80 milliGRAM(s) Oral at bedtime  cloBAZam 10 milliGRAM(s) Oral two times a day  enoxaparin Injectable 40 milliGRAM(s) SubCutaneous every 24 hours  magnesium citrate Oral Solution 1 Bottle Oral once  polyethylene glycol 3350 17 Gram(s) Oral every 24 hours  senna 2 Tablet(s) Oral at bedtime    MEDICATIONS  (PRN):  acetaminophen     Tablet .. 650 milliGRAM(s) Oral every 6 hours PRN Temp greater or equal to 38C (100.4F), Mild Pain (1 - 3)  melatonin 3 milliGRAM(s) Oral at bedtime PRN Insomnia      Allergies    penicillin (Anaphylaxis)    Intolerances        LABS:                        12.4   3.41  )-----------( 147      ( 2022 08:55 )             39.1         143  |  111<H>  |  14  ----------------------------<  87  4.2   |  22  |  0.50    Ca    8.2<L>      2022 08:55  Phos  3.2       Mg     1.8         TPro  5.9<L>  /  Alb  3.5  /  TBili  <0.2  /  DBili  x   /  AST  17  /  ALT  10  /  AlkPhos  120      PT/INR - ( 2022 17:59 )   PT: 12.7 sec;   INR: 1.06          PTT - ( 2022 17:59 )  PTT:31.6 sec  Urinalysis Basic - ( 2022 18:00 )    Color: Yellow / Appearance: Clear / S.025 / pH: x  Gluc: x / Ketone: Trace mg/dL  / Bili: Small / Urobili: 0.2 E.U./dL   Blood: x / Protein: NEGATIVE mg/dL / Nitrite: NEGATIVE   Leuk Esterase: NEGATIVE / RBC: x / WBC x   Sq Epi: x / Non Sq Epi: x / Bacteria: x        RADIOLOGY & ADDITIONAL TESTS:  Reviewed

## 2022-01-29 NOTE — H&P ADULT - PROBLEM SELECTOR PLAN 3
Patient with abdominal pain, found to have fecal impaction in colon. Nothing in rectal vault to disimpact.   - Ordered for tap water enema, magnesium citrate and lactulose

## 2022-01-29 NOTE — H&P ADULT - PROBLEM SELECTOR PLAN 4
Patient has hx of chronic UTI. Retaining urine today, UA wnl.   - Suprapubic fullness today with fecal impaction, found to be retaining 800cc urine, s/p straight cath

## 2022-01-29 NOTE — H&P ADULT - NSHPPHYSICALEXAM_GEN_ALL_CORE
.  VITAL SIGNS:  T(C): 36.4 (01-29-22 @ 04:18), Max: 36.9 (01-28-22 @ 17:56)  T(F): 97.5 (01-29-22 @ 04:18), Max: 98.5 (01-28-22 @ 17:56)  HR: 49 (01-29-22 @ 04:18) (48 - 54)  BP: 133/67 (01-29-22 @ 04:18) (92/55 - 143/78)  BP(mean): --  RR: 18 (01-29-22 @ 04:18) (15 - 18)  SpO2: 100% (01-29-22 @ 04:18) (93% - 100%)  Wt(kg): --    PHYSICAL EXAM:    Constitutional: Resting comfortably in bed; NAD  Head: NC/AT  Eyes: PERRL, EOMI, anicteric sclera  ENT: no nasal discharge; uvula midline, no oropharyngeal erythema or exudates; MMM  Neck: supple; no JVD or thyromegaly  Respiratory: CTA B/L; no W/R/R, no retractions  Cardiac: +S1/S2; RRR; no M/R/G; PMI non-displaced  Gastrointestinal: abdomen soft, NT/ND; no rebound or guarding; +BSx4  Genitourinary: normal external genitalia  Back: spine midline, no bony tenderness or step-offs; no CVAT B/L  Extremities: WWP, no clubbing or cyanosis; no peripheral edema  Musculoskeletal: NROM x4; no joint swelling, tenderness or erythema  Vascular: 2+ radial, femoral, DP/PT pulses B/L  Dermatologic: skin warm, dry and intact; no rashes, wounds, or scars  Lymphatic: no submandibular or cervical LAD  Neurologic: AAOx3; CNII-XII grossly intact; no focal deficits  Psychiatric: affect and characteristics of appearance, verbalizations, behaviors are appropriate .  VITAL SIGNS:  T(C): 36.4 (01-29-22 @ 04:18), Max: 36.9 (01-28-22 @ 17:56)  T(F): 97.5 (01-29-22 @ 04:18), Max: 98.5 (01-28-22 @ 17:56)  HR: 49 (01-29-22 @ 04:18) (48 - 54)  BP: 133/67 (01-29-22 @ 04:18) (92/55 - 143/78)  BP(mean): --  RR: 18 (01-29-22 @ 04:18) (15 - 18)  SpO2: 100% (01-29-22 @ 04:18) (93% - 100%)  Wt(kg): --    PHYSICAL EXAM:    Constitutional: Lethargic, speaking slowly in broken sentences  Head: NC/AT  Eyes: PERRL, EOMI, anicteric sclera  ENT: no nasal discharge; uvula midline, no oropharyngeal erythema or exudates; MM dry  Neck: supple; no JVD or thyromegaly  Respiratory: CTA B/L; no W/R/R, no retractions  Cardiac: +S1/S2; RRR, bradycardic   Gastrointestinal: abdomen distended; TTP diffusely, suprapubic fullness, +BSx4  Back: spine midline, no bony tenderness or step-offs; no CVAT B/L  Extremities: cool extremities, no edema  Musculoskeletal: NROM x4; no joint swelling, tenderness or erythema  Vascular: 2+ radial, femoral, DP/PT pulses B/L  Neurologic: AAOx2 (to self and year); CNII-XII grossly intact; no focal deficits  Psychiatric: Patient talks very slow and intermittently does not make sense

## 2022-01-29 NOTE — PROGRESS NOTE ADULT - PROBLEM SELECTOR PLAN 3
Patient with abdominal pain, found to have fecal impaction in colon. Nothing in rectal vault to disimpact.   Ordered for tap water enema, magnesium citrate and lactulose, patient refused mag citrate. Had small BM on abraham this AM after enema  -start senna/miralax  -c/w lactulose and mag citrate? Patient with abdominal pain, found to have fecal impaction in colon. Nothing in rectal vault to disimpact.   Ordered for tap water enema, magnesium citrate and lactulose, patient refused mag citrate. Had small BM on abraham this AM after enema  -start senna/miralax  -s/p one time lactulose, dulcolax

## 2022-01-29 NOTE — H&P ADULT - ATTENDING COMMENTS
Patient discussed with resident team and plan of care reviewed. I have personally reviewed all pertinent labs and imaging and performed an independent history and physical. Resident note personally reviewed, and I agree with above resident note with the following additions:    66YOF with history of developmental delay, epilepsy/Lennox-Gastaut, recurrent UTI admitted for weakness, abdominal pain, and headache found to have fecal impaction, sinus bradycardia 40s-50s, urinary retention, and incidentally COVID19 positive.    Problem list:  Generalized weakness/FTT  Fecal impaction  Urinary retention  Sinus bradycardia  COVID19 - asymptomatic - first positive 1/7  Lennox-Gastaut syndrome  Developmental delay  Recurrent UTIs    Infectious workup negative, will have PT evaluate. Perhaps weakness/FTT related to fecal impaction - nothing in rectal vault to disimpact from overnight; had small BM with tap water enema but will continue aggressive bowel regimen. Urinary retention and fecal impaction likely related as well. Will do usual workup with B12, TSH, etc. COVID19 first positive 1/7 and does not need isolation.

## 2022-01-29 NOTE — ED ADULT NURSE REASSESSMENT NOTE - NS ED NURSE REASSESS COMMENT FT1
Pt noted to be hypotensive. MD made aware. 1L NS to be administered. repeat ekg ordered.
Received Pt from previous RN lying on stretcher asleep, breathing without issue on NC, IV site is patent, CCM in place. Awaiting bed status.

## 2022-01-29 NOTE — H&P ADULT - PROBLEM SELECTOR PLAN 5
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 Per her son, she "goes limp" and is atonic when she is having a seizure.   - Takes Clobazam 10mg bid at home  - Takes xcopri 200mg in AM and 50mg in PM

## 2022-01-29 NOTE — PROGRESS NOTE ADULT - PROBLEM SELECTOR PLAN 6
F: s/p 2L NS  E: Replete PRN  N: regular diet  DVT PPx: Lovenox  GI PPx: None    Dispo: [facility] Patient positive for COVID, however has no symptoms and was also positive >2 weeks ago  -no need for airborne precautions  -no treatment necessary

## 2022-01-29 NOTE — H&P ADULT - PROBLEM SELECTOR PLAN 2
Patient with weakness for past few days. Bradycardic on presentation. Unclear if postictal, however, no witnessed seizure.   - CT Head negative  - PT consulted in AM  - F/u TSH Patient presented to the ED with weakness and abdominal pain, found to be bradycardic with HR 48-51. Initially cardiology was consulted by ED, who said it was not primary. Patient appears lethargic. Patient on metoprolol 25mg at home. On previous admission to ED on 1/7, patient's HR was 60-70. Currently hemodynamically stable. EKG showing sinus bradycardia.   - F/u TSH  - Lactate wnl, patient does not appear to be hypoperfusing  - Holding toprol 25 at this time

## 2022-01-29 NOTE — H&P ADULT - HISTORY OF PRESENT ILLNESS
67 y/o female with PMHx of epilepsy and chronic UTI as per old chart as Patient is a poor historian. Will attempt to obtain more collateral later. Patient presenting from home for 3 days of worsening headache episodes, and generalized weakness. Some information obtained from triage note. States Patient has been bed ridden for the last 4-5 days (usual state is walking w aid of a walker).  Otherwise, history is very limited.    In the ED:  Initial vital signs: T: 97.4F, HR: 49, BP: 125/56, R: 16, SpO2: 98% on RA -->93% then 98% on 2L NC  ED course:   Labs: significant for , Cl 109, , COVID +  Imaging:  CTH: No acute intracranial hemorrhage, mass effect, or CT evidence of recent transcortical infarction.   CT A/P: 1. There is fecal impaction throughout the colon. Relative wall thickening involving the stomach may be due to under distension. There is no bowel obstruction or pneumoperitoneum . 2. Dependent atelectasis noted in the mid and lower lung fields with small bilateral pleural effusions. Correlate with chest imaging.  EKG: sinus alexander  Medications: Tylenol 650mg x1, Levaquin 750 x1, Reglan 10mg x1, 2L NS  Consults: none  66F with developmental delay with PMHx of epilepsy and chronic UTI as per old chart (unable to obtain further history as both she and her son are poor historians) . Patient presenting from home for 3 days of worsening headache episodes, and generalized weakness. Some information obtained from triage note. States Patient has been bed ridden for the last 4-5 days (usual state is walking w aid of a walker).  Otherwise, history is very limited.    In the ED:  Initial vital signs: T: 97.4F, HR: 49, BP: 125/56, R: 16, SpO2: 98% on RA -->93% then 98% on 2L NC  ED course:   Labs: significant for , Cl 109, , COVID +  Imaging:  CTH: No acute intracranial hemorrhage, mass effect, or CT evidence of recent transcortical infarction.   CT A/P: 1. There is fecal impaction throughout the colon. Relative wall thickening involving the stomach may be due to under distension. There is no bowel obstruction or pneumoperitoneum . 2. Dependent atelectasis noted in the mid and lower lung fields with small bilateral pleural effusions. Correlate with chest imaging.  EKG: sinus alexander  Medications: Tylenol 650mg x1, Levaquin 750 x1, Reglan 10mg x1, 2L NS  Consults: none  66F with developmental delay with PMHx of epilepsy (Lennox Gastaut sydnrome?) and chronic UTI as per old chart (unable to obtain further history as both she and her son are poor historians) presents to the ED via EMS for weakness, abdominal pain and headache. She is coming from an assisted living facility. Per triage note, patient has been having worsening headache and weakness for 3 days and has been bed ridden for 4-5 days, despite usually walking with walker. Currently patient states she has lower abdominal pain and HA. She has not had a BM in 3 days. Denies fevers, chills, cp, SOB, dysuria.     In the ED:  Initial vital signs: T: 97.4F, HR: 49, BP: 125/56, R: 16, SpO2: 98% on RA -->93% then 98% on 2L NC  ED course:   Labs: significant for , Cl 109, , COVID +  Imaging:  CTH: No acute intracranial hemorrhage, mass effect, or CT evidence of recent transcortical infarction.   CT A/P: 1. There is fecal impaction throughout the colon. Relative wall thickening involving the stomach may be due to under distension. There is no bowel obstruction or pneumoperitoneum . 2. Dependent atelectasis noted in the mid and lower lung fields with small bilateral pleural effusions. Correlate with chest imaging.  EKG: sinus alexander  Medications: Tylenol 650mg x1, Levaquin 750 x1, Reglan 10mg x1, 2L NS  Consults: none

## 2022-01-29 NOTE — H&P ADULT - NSHPSOCIALHISTORY_GEN_ALL_CORE
Lives in assisted living facility with son. He takes care of her as well as 24/7 aides. Spoon fed for meals.

## 2022-01-29 NOTE — PROGRESS NOTE ADULT - PROBLEM SELECTOR PLAN 5
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 Per her son, she "goes limp" and is atonic when she is having a seizure.   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 Per her son, she "goes limp" and is atonic when she is having a seizure.   - Takes Clobazam 10mg in AM 15mg in PM at home  - Takes xcopri 200mg in AM and 50mg in PM- try and get meds from son today, we do not have  ---spoke to epilepsy at [facility], said no need to start anything else for now if cant get xcopri

## 2022-01-29 NOTE — H&P ADULT - PROBLEM SELECTOR PLAN 1
Patient presented to the ED with weakness and abdominal pain, found to be bradycardic with HR 48-51. Initially cardiology was consulted by ED, who said it was not primary. Patient appears lethargic. Patient on metoprolol 25mg at home. On previous admission to ED on 1/7, patient's HR was 60-70. Currently hemodynamically stable. EKG showing sinus bradycardia.   - F/u TSH  - Lactate wnl, patient does not appear to be hypoperfusing  - Holding toprol 25 at this time Patient with weakness for past few days. Bradycardic on presentation. Unclear if postictal, however, no witnessed seizure. Patient does not appear infected. CTH negative. Global weakness.    - Needs collateral to assess baseline  - CT Head negative  - PT consulted in AM  - F/u TSH, b12, folate  - F/u RPR  - Placed on fall precautions and aspiration precautions  - Passed bedside dysphagia

## 2022-01-29 NOTE — PATIENT PROFILE ADULT - FALL HARM RISK - HARM RISK INTERVENTIONS

## 2022-01-30 LAB
ALBUMIN SERPL ELPH-MCNC: 3.3 G/DL — SIGNIFICANT CHANGE UP (ref 3.3–5)
ALP SERPL-CCNC: 112 U/L — SIGNIFICANT CHANGE UP (ref 40–120)
ALT FLD-CCNC: 8 U/L — LOW (ref 10–45)
ANION GAP SERPL CALC-SCNC: 9 MMOL/L — SIGNIFICANT CHANGE UP (ref 5–17)
AST SERPL-CCNC: 15 U/L — SIGNIFICANT CHANGE UP (ref 10–40)
BASOPHILS # BLD AUTO: 0.02 K/UL — SIGNIFICANT CHANGE UP (ref 0–0.2)
BASOPHILS NFR BLD AUTO: 0.5 % — SIGNIFICANT CHANGE UP (ref 0–2)
BILIRUB SERPL-MCNC: 0.2 MG/DL — SIGNIFICANT CHANGE UP (ref 0.2–1.2)
BUN SERPL-MCNC: 11 MG/DL — SIGNIFICANT CHANGE UP (ref 7–23)
CALCIUM SERPL-MCNC: 8.8 MG/DL — SIGNIFICANT CHANGE UP (ref 8.4–10.5)
CHLORIDE SERPL-SCNC: 109 MMOL/L — HIGH (ref 96–108)
CO2 SERPL-SCNC: 24 MMOL/L — SIGNIFICANT CHANGE UP (ref 22–31)
CREAT SERPL-MCNC: 0.54 MG/DL — SIGNIFICANT CHANGE UP (ref 0.5–1.3)
EOSINOPHIL # BLD AUTO: 0.42 K/UL — SIGNIFICANT CHANGE UP (ref 0–0.5)
EOSINOPHIL NFR BLD AUTO: 10.1 % — HIGH (ref 0–6)
GLUCOSE SERPL-MCNC: 78 MG/DL — SIGNIFICANT CHANGE UP (ref 70–99)
HCT VFR BLD CALC: 38.6 % — SIGNIFICANT CHANGE UP (ref 34.5–45)
HCV AB S/CO SERPL IA: 0.05 S/CO — SIGNIFICANT CHANGE UP
HCV AB SERPL-IMP: SIGNIFICANT CHANGE UP
HGB BLD-MCNC: 12 G/DL — SIGNIFICANT CHANGE UP (ref 11.5–15.5)
IMM GRANULOCYTES NFR BLD AUTO: 0.2 % — SIGNIFICANT CHANGE UP (ref 0–1.5)
LYMPHOCYTES # BLD AUTO: 1.31 K/UL — SIGNIFICANT CHANGE UP (ref 1–3.3)
LYMPHOCYTES # BLD AUTO: 31.4 % — SIGNIFICANT CHANGE UP (ref 13–44)
MAGNESIUM SERPL-MCNC: 1.9 MG/DL — SIGNIFICANT CHANGE UP (ref 1.6–2.6)
MCHC RBC-ENTMCNC: 31.1 GM/DL — LOW (ref 32–36)
MCHC RBC-ENTMCNC: 31.7 PG — SIGNIFICANT CHANGE UP (ref 27–34)
MCV RBC AUTO: 102.1 FL — HIGH (ref 80–100)
MONOCYTES # BLD AUTO: 0.35 K/UL — SIGNIFICANT CHANGE UP (ref 0–0.9)
MONOCYTES NFR BLD AUTO: 8.4 % — SIGNIFICANT CHANGE UP (ref 2–14)
NEUTROPHILS # BLD AUTO: 2.06 K/UL — SIGNIFICANT CHANGE UP (ref 1.8–7.4)
NEUTROPHILS NFR BLD AUTO: 49.4 % — SIGNIFICANT CHANGE UP (ref 43–77)
NRBC # BLD: 0 /100 WBCS — SIGNIFICANT CHANGE UP (ref 0–0)
PHOSPHATE SERPL-MCNC: 2.7 MG/DL — SIGNIFICANT CHANGE UP (ref 2.5–4.5)
PLATELET # BLD AUTO: 151 K/UL — SIGNIFICANT CHANGE UP (ref 150–400)
POTASSIUM SERPL-MCNC: 3.7 MMOL/L — SIGNIFICANT CHANGE UP (ref 3.5–5.3)
POTASSIUM SERPL-SCNC: 3.7 MMOL/L — SIGNIFICANT CHANGE UP (ref 3.5–5.3)
PROT SERPL-MCNC: 5.7 G/DL — LOW (ref 6–8.3)
RBC # BLD: 3.78 M/UL — LOW (ref 3.8–5.2)
RBC # FLD: 12.6 % — SIGNIFICANT CHANGE UP (ref 10.3–14.5)
SODIUM SERPL-SCNC: 142 MMOL/L — SIGNIFICANT CHANGE UP (ref 135–145)
T4 AB SER-ACNC: 5.06 UG/DL — SIGNIFICANT CHANGE UP (ref 4.5–11.7)
TSH SERPL-MCNC: 2.29 UIU/ML — SIGNIFICANT CHANGE UP (ref 0.27–4.2)
WBC # BLD: 4.17 K/UL — SIGNIFICANT CHANGE UP (ref 3.8–10.5)
WBC # FLD AUTO: 4.17 K/UL — SIGNIFICANT CHANGE UP (ref 3.8–10.5)

## 2022-01-30 PROCEDURE — 99233 SBSQ HOSP IP/OBS HIGH 50: CPT

## 2022-01-30 RX ORDER — POTASSIUM CHLORIDE 20 MEQ
40 PACKET (EA) ORAL ONCE
Refills: 0 | Status: COMPLETED | OUTPATIENT
Start: 2022-01-30 | End: 2022-01-30

## 2022-01-30 RX ORDER — MAGNESIUM SULFATE 500 MG/ML
1 VIAL (ML) INJECTION ONCE
Refills: 0 | Status: COMPLETED | OUTPATIENT
Start: 2022-01-30 | End: 2022-01-30

## 2022-01-30 RX ADMIN — PREGABALIN 1000 MICROGRAM(S): 225 CAPSULE ORAL at 13:46

## 2022-01-30 RX ADMIN — Medication 50 MILLIGRAM(S): at 18:21

## 2022-01-30 RX ADMIN — SENNA PLUS 2 TABLET(S): 8.6 TABLET ORAL at 22:20

## 2022-01-30 RX ADMIN — ATORVASTATIN CALCIUM 20 MILLIGRAM(S): 80 TABLET, FILM COATED ORAL at 22:21

## 2022-01-30 RX ADMIN — Medication 100 MILLIGRAM(S): at 13:47

## 2022-01-30 RX ADMIN — Medication 40 MILLIEQUIVALENT(S): at 13:46

## 2022-01-30 RX ADMIN — CLOBAZAM 5 MILLIGRAM(S): 10 TABLET ORAL at 18:21

## 2022-01-30 RX ADMIN — ENOXAPARIN SODIUM 40 MILLIGRAM(S): 100 INJECTION SUBCUTANEOUS at 07:44

## 2022-01-30 RX ADMIN — POLYETHYLENE GLYCOL 3350 17 GRAM(S): 17 POWDER, FOR SOLUTION ORAL at 13:46

## 2022-01-30 RX ADMIN — CLOBAZAM 10 MILLIGRAM(S): 10 TABLET ORAL at 06:35

## 2022-01-30 RX ADMIN — Medication 100 GRAM(S): at 13:46

## 2022-01-30 NOTE — PROGRESS NOTE ADULT - SUBJECTIVE AND OBJECTIVE BOX
Subjective/Interval events:  No acute overnight events. Pleasant but somewhat confused appearing. Answers questions appropriately, just disoriented (AAOx person only), denies pain, SOB, dizziness, lightheadedness.     MEDICATIONS  (STANDING):  atorvastatin 20 milliGRAM(s) Oral at bedtime  cloBAZam 10 milliGRAM(s) Oral every 24 hours  cloBAZam 5 milliGRAM(s) Oral every 24 hours  cyanocobalamin 1000 MICROGram(s) Oral daily  enoxaparin Injectable 40 milliGRAM(s) SubCutaneous every 24 hours  magnesium citrate Oral Solution 1 Bottle Oral once  nitrofurantoin macrocrystals (MACRODANTIN) 50 milliGRAM(s) Oral every 24 hours  polyethylene glycol 3350 17 Gram(s) Oral every 24 hours  senna 2 Tablet(s) Oral at bedtime  thiamine 100 milliGRAM(s) Oral daily    MEDICATIONS  (PRN):  acetaminophen     Tablet .. 650 milliGRAM(s) Oral every 6 hours PRN Temp greater or equal to 38C (100.4F), Mild Pain (1 - 3)  melatonin 3 milliGRAM(s) Oral at bedtime PRN Insomnia      Vital Signs Last 24 Hrs  T(C): 36.5 (30 Jan 2022 12:10), Max: 37.1 (29 Jan 2022 20:48)  T(F): 97.7 (30 Jan 2022 12:10), Max: 98.8 (29 Jan 2022 20:48)  HR: 56 (30 Jan 2022 12:10) (54 - 58)  BP: 135/72 (30 Jan 2022 12:10) (118/54 - 147/76)  BP(mean): --  RR: 18 (30 Jan 2022 12:10) (18 - 19)  SpO2: 96% (30 Jan 2022 12:10) (96% - 97%)    PHYSICAL EXAM:  GENERAL: thin appearing, pleasant, appropriate, no acute distress. Participating appropriately in interview, though appears confused/oriented only to person.  HEENT - NC/AT, EOMI, PERLLA, oropharynx clear without exudate or erythema, moist mucus membranes  NECK: Soft, supple, No JVD, no lymphadenopathy  CHEST/LUNG: Clear to auscultation bilaterally; No rales, rhonchi, wheezing. Normal work of breathing, not tachypneic  HEART: Regular rate and rhythm; No murmurs, rubs, or gallops, normal s1/s2. Warm and well-perfused  ABDOMEN: Soft, Nontender, Nondistended. Normoactive bowel sounds.  EXTREMITIES:  2+ Peripheral Pulses, No clubbing, cyanosis, or edema  NEURO:  awake, alert, oriented to person only. Cranial nerves intact, no motor or sensory deficits. Moves all extremities.  SKIN: No rashes or lesions    LABS:  01-30    142  |  109  |  11  ----------------------------<  78  3.7   |  24  |  0.54    Ca    8.8      30 Jan 2022 08:19  Phos  2.7     01-30  Mg     1.9     01-30    TPro  5.7  /  Alb  3.3  /  TBili  0.2  /  DBili  x   /  AST  15  /  ALT  8   /  AlkPhos  112  01-30                          12.0   4.17  )-----------( 151      ( 30 Jan 2022 08:19 )             38.6       Culture - Blood (collected 29 Jan 2022 01:46)  Source: .Blood Blood  Preliminary Report (30 Jan 2022 02:02):    No growth to date.    Culture - Blood (collected 29 Jan 2022 01:46)  Source: .Blood Blood  Preliminary Report (30 Jan 2022 02:02):    No growth to date.    Culture - Urine (collected 29 Jan 2022 00:30)  Source: Clean Catch Clean Catch (Midstream)  Final Report (29 Jan 2022 20:33):    No growth      COVID-19 PCR: Detected (01-28-22 @ 17:59)  COVID-19 PCR: Detected (01-07-22 @ 16:53)      RADIOLOGY & ADDITIONAL TESTS:  reviewed, none new Subjective/Interval events:  No acute overnight events. Reportedly had BM overnight. Pleasant but somewhat confused appearing. Answers questions appropriately, just disoriented (AAOx person only), denies pain, SOB, dizziness, lightheadedness.     MEDICATIONS  (STANDING):  atorvastatin 20 milliGRAM(s) Oral at bedtime  cloBAZam 10 milliGRAM(s) Oral every 24 hours  cloBAZam 5 milliGRAM(s) Oral every 24 hours  cyanocobalamin 1000 MICROGram(s) Oral daily  enoxaparin Injectable 40 milliGRAM(s) SubCutaneous every 24 hours  magnesium citrate Oral Solution 1 Bottle Oral once  nitrofurantoin macrocrystals (MACRODANTIN) 50 milliGRAM(s) Oral every 24 hours  polyethylene glycol 3350 17 Gram(s) Oral every 24 hours  senna 2 Tablet(s) Oral at bedtime  thiamine 100 milliGRAM(s) Oral daily    MEDICATIONS  (PRN):  acetaminophen     Tablet .. 650 milliGRAM(s) Oral every 6 hours PRN Temp greater or equal to 38C (100.4F), Mild Pain (1 - 3)  melatonin 3 milliGRAM(s) Oral at bedtime PRN Insomnia      Vital Signs Last 24 Hrs  T(C): 36.5 (30 Jan 2022 12:10), Max: 37.1 (29 Jan 2022 20:48)  T(F): 97.7 (30 Jan 2022 12:10), Max: 98.8 (29 Jan 2022 20:48)  HR: 56 (30 Jan 2022 12:10) (54 - 58)  BP: 135/72 (30 Jan 2022 12:10) (118/54 - 147/76)  BP(mean): --  RR: 18 (30 Jan 2022 12:10) (18 - 19)  SpO2: 96% (30 Jan 2022 12:10) (96% - 97%)    PHYSICAL EXAM:  GENERAL: thin appearing, pleasant, appropriate, no acute distress. Participating appropriately in interview, though appears confused/oriented only to person.  HEENT - NC/AT, EOMI, PERLLA, oropharynx clear without exudate or erythema, moist mucus membranes  NECK: Soft, supple, No JVD, no lymphadenopathy  CHEST/LUNG: Clear to auscultation bilaterally; No rales, rhonchi, wheezing. Normal work of breathing, not tachypneic  HEART: Regular rate and rhythm; No murmurs, rubs, or gallops, normal s1/s2. Warm and well-perfused  ABDOMEN: Soft, Nontender, Nondistended. Normoactive bowel sounds.  EXTREMITIES:  2+ Peripheral Pulses, No clubbing, cyanosis, or edema  NEURO:  awake, alert, oriented to person only. Cranial nerves intact, no motor or sensory deficits. Moves all extremities.  SKIN: No rashes or lesions    LABS:  01-30    142  |  109  |  11  ----------------------------<  78  3.7   |  24  |  0.54    Ca    8.8      30 Jan 2022 08:19  Phos  2.7     01-30  Mg     1.9     01-30    TPro  5.7  /  Alb  3.3  /  TBili  0.2  /  DBili  x   /  AST  15  /  ALT  8   /  AlkPhos  112  01-30                          12.0   4.17  )-----------( 151      ( 30 Jan 2022 08:19 )             38.6       Culture - Blood (collected 29 Jan 2022 01:46)  Source: .Blood Blood  Preliminary Report (30 Jan 2022 02:02):    No growth to date.    Culture - Blood (collected 29 Jan 2022 01:46)  Source: .Blood Blood  Preliminary Report (30 Jan 2022 02:02):    No growth to date.    Culture - Urine (collected 29 Jan 2022 00:30)  Source: Clean Catch Clean Catch (Midstream)  Final Report (29 Jan 2022 20:33):    No growth      COVID-19 PCR: Detected (01-28-22 @ 17:59)  COVID-19 PCR: Detected (01-07-22 @ 16:53)      RADIOLOGY & ADDITIONAL TESTS:  reviewed, none new

## 2022-01-30 NOTE — PHYSICAL THERAPY INITIAL EVALUATION ADULT - PHYSICAL ASSIST/NONPHYSICAL ASSIST: STAND/SIT, REHAB EVAL
verbal cues/nonverbal cues (demo/gestures)/1 person assist Voluntary Intramuscular (indication, name, dose, time)/Oral Medication (indication, name, dose, time)

## 2022-01-30 NOTE — PHYSICAL THERAPY INITIAL EVALUATION ADULT - PERTINENT HX OF CURRENT PROBLEM, REHAB EVAL
66F with developmental delay with PMHx of epilepsy (Lennox Gastaut sydnrome?) and chronic UTI as per old chart (unable to obtain further history as both she and her son are poor historians) presents to the ED via EMS for weakness, abdominal pain and headache. She is coming from an assisted living facility.

## 2022-01-31 ENCOUNTER — TRANSCRIPTION ENCOUNTER (OUTPATIENT)
Age: 67
End: 2022-01-31

## 2022-01-31 LAB
ALBUMIN SERPL ELPH-MCNC: 3.9 G/DL — SIGNIFICANT CHANGE UP (ref 3.3–5)
ALP SERPL-CCNC: 127 U/L — HIGH (ref 40–120)
ALT FLD-CCNC: 9 U/L — LOW (ref 10–45)
ANION GAP SERPL CALC-SCNC: 12 MMOL/L — SIGNIFICANT CHANGE UP (ref 5–17)
AST SERPL-CCNC: 20 U/L — SIGNIFICANT CHANGE UP (ref 10–40)
BILIRUB SERPL-MCNC: 0.2 MG/DL — SIGNIFICANT CHANGE UP (ref 0.2–1.2)
BUN SERPL-MCNC: 9 MG/DL — SIGNIFICANT CHANGE UP (ref 7–23)
CALCIUM SERPL-MCNC: 9 MG/DL — SIGNIFICANT CHANGE UP (ref 8.4–10.5)
CHLORIDE SERPL-SCNC: 105 MMOL/L — SIGNIFICANT CHANGE UP (ref 96–108)
CO2 SERPL-SCNC: 25 MMOL/L — SIGNIFICANT CHANGE UP (ref 22–31)
CREAT SERPL-MCNC: 0.54 MG/DL — SIGNIFICANT CHANGE UP (ref 0.5–1.3)
FOLATE SERPL-MCNC: 6.8 NG/ML — SIGNIFICANT CHANGE UP
GLUCOSE SERPL-MCNC: 80 MG/DL — SIGNIFICANT CHANGE UP (ref 70–99)
MAGNESIUM SERPL-MCNC: 2.1 MG/DL — SIGNIFICANT CHANGE UP (ref 1.6–2.6)
PHOSPHATE SERPL-MCNC: 2.9 MG/DL — SIGNIFICANT CHANGE UP (ref 2.5–4.5)
POTASSIUM SERPL-MCNC: 3.6 MMOL/L — SIGNIFICANT CHANGE UP (ref 3.5–5.3)
POTASSIUM SERPL-SCNC: 3.6 MMOL/L — SIGNIFICANT CHANGE UP (ref 3.5–5.3)
PROT SERPL-MCNC: 6.4 G/DL — SIGNIFICANT CHANGE UP (ref 6–8.3)
SODIUM SERPL-SCNC: 142 MMOL/L — SIGNIFICANT CHANGE UP (ref 135–145)
T PALLIDUM AB TITR SER: NEGATIVE — SIGNIFICANT CHANGE UP
VIT B12 SERPL-MCNC: 1472 PG/ML — HIGH (ref 232–1245)

## 2022-01-31 PROCEDURE — 99233 SBSQ HOSP IP/OBS HIGH 50: CPT | Mod: GC

## 2022-01-31 RX ORDER — POTASSIUM CHLORIDE 20 MEQ
40 PACKET (EA) ORAL ONCE
Refills: 0 | Status: COMPLETED | OUTPATIENT
Start: 2022-01-31 | End: 2022-01-31

## 2022-01-31 RX ADMIN — CLOBAZAM 5 MILLIGRAM(S): 10 TABLET ORAL at 18:46

## 2022-01-31 RX ADMIN — POLYETHYLENE GLYCOL 3350 17 GRAM(S): 17 POWDER, FOR SOLUTION ORAL at 11:32

## 2022-01-31 RX ADMIN — SENNA PLUS 2 TABLET(S): 8.6 TABLET ORAL at 22:03

## 2022-01-31 RX ADMIN — ATORVASTATIN CALCIUM 20 MILLIGRAM(S): 80 TABLET, FILM COATED ORAL at 22:03

## 2022-01-31 RX ADMIN — Medication 1 BOTTLE: at 13:25

## 2022-01-31 RX ADMIN — Medication 40 MILLIEQUIVALENT(S): at 13:23

## 2022-01-31 RX ADMIN — Medication 50 MILLIGRAM(S): at 17:48

## 2022-01-31 RX ADMIN — ENOXAPARIN SODIUM 40 MILLIGRAM(S): 100 INJECTION SUBCUTANEOUS at 06:30

## 2022-01-31 RX ADMIN — PREGABALIN 1000 MICROGRAM(S): 225 CAPSULE ORAL at 13:23

## 2022-01-31 RX ADMIN — Medication 100 MILLIGRAM(S): at 11:32

## 2022-01-31 RX ADMIN — CLOBAZAM 10 MILLIGRAM(S): 10 TABLET ORAL at 06:30

## 2022-01-31 NOTE — PROGRESS NOTE ADULT - PROBLEM SELECTOR PLAN 3
Patient with abdominal pain, found to have fecal impaction in colon. Nothing in rectal vault to disimpact.   Ordered for tap water enema, magnesium citrate and lactulose, patient refused mag citrate. Had one large BM on 1/29 after second enema  -c/w senna/miralax  -s/p one time lactulose, dulcolax

## 2022-01-31 NOTE — PROGRESS NOTE ADULT - PROBLEM SELECTOR PLAN 1
Patient with weakness for past few days. Bradycardic on presentation. Unclear if postictal, however, no witnessed seizure. Patient does not appear infected. CTH negative. Global weakness.    - CT Head negative  - PT consulted   - F/u TSH, b12, folate  - F/u RPR  - Placed on fall precautions and aspiration precautions
Patient with weakness for past few days. Bradycardic on presentation. Unclear if postictal, however, no witnessed seizure. Patient does not appear infected. CTH negative. 5/5 strength on exam  - CT Head negative  - PT consulted, f/u dispo recs  - TSH negative  - F/u b12, folate, RPR  - Placed on fall precautions and aspiration precautions

## 2022-01-31 NOTE — DISCHARGE NOTE PROVIDER - NSDCMRMEDTOKEN_GEN_ALL_CORE_FT
cloBAZam 10 mg oral tablet: 1 tab(s) orally once a day in AM  cloBAZam 5 mg oral tablet: 3 tab(s) orally once a day in PM  Crestor 5 mg oral tablet: 1 tab(s) orally once a day  lisinopril 2.5 mg oral tablet: 1 tab(s) orally once a day  metoprolol succinate 25 mg oral tablet, extended release: 1 tab(s) orally once a day  nitrofurantoin macrocrystals 50 mg oral capsule: 1 cap(s) orally once a day  Senna 8.6 mg oral tablet: 1 tab(s) orally once a day (at bedtime)  thiamine 100 mg oral tablet: 1 tab(s) orally once a day  Vitamin B-12 1000 mcg oral tablet: 1 tab(s) orally once a day  Xcopri 200 mg oral tablet: 1 tab(s) orally once a day in the morning  Xcopri 50 mg oral tablet: 1 tab(s) orally once a day in the evening   cloBAZam 10 mg oral tablet: 1 tab(s) orally once a day in AM  cloBAZam 5 mg oral tablet: 3 tab(s) orally once a day in PM  Crestor 5 mg oral tablet: 1 tab(s) orally once a day  lisinopril 2.5 mg oral tablet: 1 tab(s) orally once a day  nitrofurantoin macrocrystals 50 mg oral capsule: 1 cap(s) orally once a day  Senna 8.6 mg oral tablet: 1 tab(s) orally once a day (at bedtime)  thiamine 100 mg oral tablet: 1 tab(s) orally once a day  Vitamin B-12 1000 mcg oral tablet: 1 tab(s) orally once a day  Xcopri 200 mg oral tablet: 1 tab(s) orally once a day in the morning  Xcopri 50 mg oral tablet: 1 tab(s) orally once a day in the evening   cloBAZam 10 mg oral tablet: 1 tab(s) orally once a day in AM  cloBAZam 5 mg oral tablet: 3 tab(s) orally once a day in PM  Crestor 5 mg oral tablet: 1 tab(s) orally once a day  lisinopril 2.5 mg oral tablet: 1 tab(s) orally once a day  nitrofurantoin macrocrystals 50 mg oral capsule: 1 cap(s) orally once a day  thiamine 100 mg oral tablet: 1 tab(s) orally once a day  Vitamin B-12 1000 mcg oral tablet: 1 tab(s) orally once a day  Xcopri 200 mg oral tablet: 1 tab(s) orally once a day in the morning  Xcopri 50 mg oral tablet: 1 tab(s) orally once a day in the evening   cloBAZam 10 mg oral tablet: 1 tab(s) orally once a day in AM  cloBAZam 5 mg oral tablet: 3 tab(s) orally once a day in PM  Crestor 5 mg oral tablet: 1 tab(s) orally once a day  docusate sodium 100 mg rectal enema: 1 each rectally once a day, As Needed -for constipation   lisinopril 2.5 mg oral tablet: 1 tab(s) orally once a day  MiraLax oral powder for reconstitution: 17 gram(s) orally every 24 hours  nitrofurantoin macrocrystals 50 mg oral capsule: 1 cap(s) orally once a day  Senna 8.6 mg oral tablet: 1 tab(s) orally once a day (at bedtime)  thiamine 100 mg oral tablet: 1 tab(s) orally once a day  Vitamin B-12 1000 mcg oral tablet: 1 tab(s) orally once a day  Xcopri 200 mg oral tablet: 1 tab(s) orally once a day in the morning  Xcopri 50 mg oral tablet: 1 tab(s) orally once a day in the evening

## 2022-01-31 NOTE — PROGRESS NOTE ADULT - ATTENDING COMMENTS
Patient discussed with resident team and plan of care reviewed. I have personally reviewed all pertinent labs and imaging and performed an independent history and physical. Resident note personally reviewed, and I agree with above resident note with the following additions:    66YOF with history of developmental delay, epilepsy/Lennox-Gastaut syndrome, recurrent UTI admitted for weakness, abdominal pain, and headache found to have fecal impaction, sinus bradycardia, urinary retention, and incidentally COVID19 positive (first positive 1/7, doesn't need isolation). Has clinically improved with bowel regimen and supportive care.    Problem list:  Generalized weakness/FTT  Fecal impaction  Urinary retention  Sinus bradycardia  COVID19 - asymptomatic - first positive 1/7 - does not need isolation  Lennox-Gastaut syndrome  Developmental delay  Recurrent UTIs    Mental status today is a little better than yesterday - she's oriented to person, "hospital," and "2022." Continue bowel regimen, supportive management. PT rec DARCY - SW to discuss with family as she lives in [place] with [facility]. She is medically ready for discharge once dispo is determined.
see H&P for today's plan/billing

## 2022-01-31 NOTE — PROGRESS NOTE ADULT - PROBLEM SELECTOR PLAN 4
Patient has hx of chronic UTI. Retaining urine today, UA wnl. Was prescribed nitrofurantoin as prophylaxis for UTIs  - Suprapubic fullness today with fecal impaction, found to be retaining 800cc urine, s/p straight cath
Patient has hx of chronic UTI. Was retaining first 24 hours of admission, UA wnl. Was prescribed nitrofurantoin as prophylaxis for UTIs  - Suprapubic fullness resolved  - good UOP now w/o retention  -c/w nitrofurantoin

## 2022-01-31 NOTE — DISCHARGE NOTE PROVIDER - CARE PROVIDER_API CALL
nicol rivero  Phone: (   )    -  Fax: (   )    -  Established Patient  Follow Up Time: 2 weeks   nicol rivero  [facility]  Phone: (   )    -  Fax: (   )    -  Established Patient  Follow Up Time: 2 weeks    john salvador  [facility]  Phone: [phone]  Fax: (   )    -  Follow Up Time: 2 weeks

## 2022-01-31 NOTE — PROGRESS NOTE ADULT - PROBLEM SELECTOR PLAN 6
Patient positive for COVID, however has no symptoms and was also positive >2 weeks ago  -no need for airborne precautions  -no treatment necessary

## 2022-01-31 NOTE — CONSULT NOTE ADULT - SUBJECTIVE AND OBJECTIVE BOX
Patient is a 66y old  Female who presents with a chief complaint of Weakness and fecal impaction (30 Jan 2022 14:16)       HPI:  66F with developmental delay with PMHx of epilepsy (Lennox Gastaut sydnrome?) and chronic UTI as per old chart (unable to obtain further history as both she and her son are poor historians) presents to the ED via EMS for weakness, abdominal pain and headache. She is coming from an assisted living facility. Per triage note, patient has been having worsening headache and weakness for 3 days and has been bed ridden for 4-5 days, despite usually walking with walker. Currently patient states she has lower abdominal pain and HA. She has not had a BM in 3 days. Denies fevers, chills, cp, SOB, dysuria.     In the ED:  Initial vital signs: T: 97.4F, HR: 49, BP: 125/56, R: 16, SpO2: 98% on RA -->93% then 98% on 2L NC  ED course:   Labs: significant for , Cl 109, , COVID +  Imaging:  CTH: No acute intracranial hemorrhage, mass effect, or CT evidence of recent transcortical infarction.   CT A/P: 1. There is fecal impaction throughout the colon. Relative wall thickening involving the stomach may be due to under distension. There is no bowel obstruction or pneumoperitoneum . 2. Dependent atelectasis noted in the mid and lower lung fields with small bilateral pleural effusions. Correlate with chest imaging.  EKG: sinus alexander  Medications: Tylenol 650mg x1, Levaquin 750 x1, Reglan 10mg x1, 2L NS  Consults: none  (29 Jan 2022 04:32)      PAST MEDICAL & SURGICAL HISTORY:  Chronic UTI (urinary tract infection)    Epilepsy        MEDICATIONS  (STANDING):  atorvastatin 20 milliGRAM(s) Oral at bedtime  cloBAZam 10 milliGRAM(s) Oral every 24 hours  cloBAZam 5 milliGRAM(s) Oral every 24 hours  cyanocobalamin 1000 MICROGram(s) Oral daily  enoxaparin Injectable 40 milliGRAM(s) SubCutaneous every 24 hours  magnesium citrate Oral Solution 1 Bottle Oral once  nitrofurantoin macrocrystals (MACRODANTIN) 50 milliGRAM(s) Oral every 24 hours  polyethylene glycol 3350 17 Gram(s) Oral every 24 hours  senna 2 Tablet(s) Oral at bedtime  thiamine 100 milliGRAM(s) Oral daily    MEDICATIONS  (PRN):  acetaminophen     Tablet .. 650 milliGRAM(s) Oral every 6 hours PRN Temp greater or equal to 38C (100.4F), Mild Pain (1 - 3)  melatonin 3 milliGRAM(s) Oral at bedtime PRN Insomnia      FAMILY HISTORY:      CBC Full  -  ( 30 Jan 2022 08:19 )  WBC Count : 4.17 K/uL  RBC Count : 3.78 M/uL  Hemoglobin : 12.0 g/dL  Hematocrit : 38.6 %  Platelet Count - Automated : 151 K/uL  Mean Cell Volume : 102.1 fl  Mean Cell Hemoglobin : 31.7 pg  Mean Cell Hemoglobin Concentration : 31.1 gm/dL  Auto Neutrophil # : 2.06 K/uL  Auto Lymphocyte # : 1.31 K/uL  Auto Monocyte # : 0.35 K/uL  Auto Eosinophil # : 0.42 K/uL  Auto Basophil # : 0.02 K/uL  Auto Neutrophil % : 49.4 %  Auto Lymphocyte % : 31.4 %  Auto Monocyte % : 8.4 %  Auto Eosinophil % : 10.1 %  Auto Basophil % : 0.5 %      01-30    142  |  109<H>  |  11  ----------------------------<  78  3.7   |  24  |  0.54    Ca    8.8      30 Jan 2022 08:19  Phos  2.7     01-30  Mg     1.9     01-30    TPro  5.7<L>  /  Alb  3.3  /  TBili  0.2  /  DBili  x   /  AST  15  /  ALT  8<L>  /  AlkPhos  112  01-30            Radiology:    < from: Xray Chest 1 View AP/PA (01.28.22 @ 17:12) >  EXAM:  XR CHEST AP OR PA 1V                           PROCEDURE DATE:  01/28/2022          INTERPRETATION:  History: Altered mental status.    Technique: Single view of the chest was performed.    Comparisons: Comparison is made with prior study performed on 1/7/2022.    Findings: Battery pack projects over the left hemithorax with stimulator   leads traveling towards the neck, clinical correlation recommended.    The cardiac silhouette is not enlarged.    No pneumothorax is visualized.    No pneumomediastinum is visualized.    No pleural effusions visualized.    No focal airspace consolidation is visualized.    No acute fracture is visualized.    Impression: No acute abnormality visualized.      < from: CT Head No Cont (01.28.22 @ 17:39) >  EXAM:  CT BRAIN                           PROCEDURE DATE:  01/28/2022          INTERPRETATION:  Easton MULTANI MD, have reviewed the images;   agree with the preliminary reported findings.    No acute intracranial pathology.    Along the atrium of the right lateral ventricle, there is asymmetric soft   tissue equal to gray matter density, most compatible with NODULAR   HETEROTOPIA. Review of the clinical note reports PMH of epilepsy. Finding   may already be known if the patient has ever undergone prior MRI workup.      PRELIMINARY RESIDENT REPORT:    CT HEAD WITHOUT CONTRAST    INDICATIONS: Altered mental status.    TECHNIQUE: Serial axial images were obtained from the skull base to the   vertex without the use of intravenous contrast. Sagittal and coronal   reformatted images were created from the axial data set. Images were   reviewed in the bone, brain and subdural windows.    COMPARISON: CT head 1/7/2022    FINDINGS:  INTRA-AXIAL: No intracranial mass effect, acute hemorrhage, midline shift   or acute transcortical infarct is seen. There are patchy periventricular   white matter hypodensities which are nonspecific and may represent the   sequela of long-standing small vessel ischemic disease.  EXTRA-AXIAL: No extra-axial fluid collection is present.  VENTRICLES AND SULCI: Parenchymal volume is commensurate with patient   age. No hydrocephalus.  VISUALIZED SINUSES: Mild mucosal thickening of the right sphenoid sinus.   The remaining sinuses are well aerated.  VISUALIZED MASTOIDS: Clear.  CALVARIUM: No fracture. Diffuse calvarial thickening.  MISCELLANEOUS: None.    IMPRESSION: No acute intracranial hemorrhage, mass effect, or CT evidence   of recent transcortical infarction.      < from: CT Abdomen and Pelvis w/ IV Cont (01.28.22 @ 22:15) >  PROCEDURE INFORMATION:  Exam: CT Abdomen And Pelvis With Contrast  Exam date and time: 1/28/2022 9:59 PM  Age: 66 years old  Clinical indication: Patient HX: Diffuse abdominal pain  TECHNIQUE:  Imaging protocol: Computed tomography of the abdomen and pelvis with   contrast.  COMPARISON:  CR XR CHEST 1/28/2022 5:08 PM    FINDINGS:  Lungs: Dependent atelectasis noted in the mid and lower lung fields with   small bilateral pleural  effusions. Correlate with chest imaging.  Liver: Normal. No mass.  Gallbladder and bile ducts: There is no biliary dilatation.  Pancreas: Minimal distension pancreatic duct.  Spleen: Spleen demonstrates small sub cm low-attenuation lesion   posteriorly may be cysts could be  confirmed with sonography.  Adrenal glands: No adrenal masses.  Kidneys and ureters: Low-attenuation lesion lateral aspect right kidney   may be cysts could be  confirmed with sonography. Low-attenuation lesion is smallerlateral   aspect left kidney..  Stomach and bowel: There is fecal material throughout the colon. There is   no bowel obstruction or  pneumoperitoneum . Relative wall thickening stomach may be due to under   distension.  Appendix: No evidence of any inflammatory changes in the anticipated   location of the appendix.  Intraperitoneal space: There is no evidence of pneumoperitoneum.  Vasculature: No aneurysm abdominal aorta. There is mild periportal edema.   Splenic and portal  veins are patent.  Lymph nodes: No definite adenopathy.  Urinary bladder: Unremarkable as visualized.  Reproductive: Unremarkable as visualized.  Bones/joints: Degenerative changes L5-S1. Compression deformity upper   aspect T12 with mild  posterior projecting bone likelynonacute. However clinical correlation   and if needed MRI for further  assessment.  Soft tissues: Nonacute.  Other findings: there are no prior studies available for comparison.    IMPRESSION:  1. There is fecal impaction throughout the colon. Relative wall   thickening involving the stomach may  be due to under distension. There is no bowel obstruction or   pneumoperitoneum .  2. Dependent atelectasis noted in the mid and lower lung fields with   small bilateral pleural effusions.  Correlate with chest imaging.  3. Other incidental findings as above.            Vital Signs Last 24 Hrs  T(C): 36.4 (31 Jan 2022 06:23), Max: 36.6 (30 Jan 2022 20:38)  T(F): 97.5 (31 Jan 2022 06:23), Max: 97.9 (30 Jan 2022 20:38)  HR: 50 (31 Jan 2022 06:23) (50 - 56)  BP: 103/66 (31 Jan 2022 06:23) (103/66 - 135/72)  BP(mean): --  RR: 18 (31 Jan 2022 06:23) (18 - 18)  SpO2: 98% (31 Jan 2022 06:23) (96% - 98%)        REVIEW OF SYSTEMS:  per HPI        Physical Exam:  frail 67 yo woman lying in semi Pandey's position, c/o feeling tired    Head: normocephalic, atraumatic    Eyes: PERRLA, EOMI, no nystagmus, sclera anicteric    ENT: nasal discharge, uvula midline, no oropharyngeal erythema/exudate    Neck: supple, negative JVD, negative carotid bruits, no thyromegaly    Chest: CTA bilaterally, neg wheeze/ rhonchi/ rales/ crackles/ egophany    Cardiovascular: regular rate and rhythm, neg murmurs/rubs/gallops    Abdomen: soft, distended, mildly tender to palpation in all 4 quadrants, negative rebound/guarding, normal bowel sounds    Extremities: WWP, neg cyanosis/clubbing/edema, negative calf tenderness to palpation, negative Jenni's sign      Neurologic Exam:    Alert and oriented to person, speech fluent w/o dysarthria    Motor Exam:    Right UE:            no focal weakness, > 3+/5    Left UE:               no focal weakness, > 3+/5      Right LE:             no focal weakness, > 3+/5    Left LE:                no focal weakness, > 3+/5               Sensation:           intact to light touch x 4 extremities                                                 DTR:                  biceps/brachioradialis: equal                                                     patella/ankle: equal                              Gait:  not tested              PM&R Impression:    1) deconditioned  2) no focal weakness    Recommendations/ Plan :    1) Physical therapy focusing on therapeutic exercises, bed mobility/transfer out of bed evaluation, progressive ambulation with assistive devices prn.    2) Anticipated Disposition Plan/Recs:    subacute rehab placement

## 2022-01-31 NOTE — DISCHARGE NOTE PROVIDER - PROVIDER TOKENS
FREE:[LAST:[mta],FIRST:[nicol],PHONE:[(   )    -],FAX:[(   )    -],ADDRESS:[[facility]],FOLLOWUP:[2 weeks],ESTABLISHEDPATIENT:[T]] FREE:[LAST:[mat],FIRST:[nicol],PHONE:[(   )    -],FAX:[(   )    -],ADDRESS:[[facility]FOLLOWUP:[2 weeks],ESTABLISHEDPATIENT:[T]],FREE:[LAST:[[place]],FIRST:[john],PHONE:[phone]],FAX:[(   )    -],ADDRESS:[[facility]],FOLLOWUP:[2 weeks]]

## 2022-01-31 NOTE — PROGRESS NOTE ADULT - ASSESSMENT
66YOF with history of developmental delay, epilepsy/Lennox-Gastaut, recurrent UTI admitted for weakness, abdominal pain, and headache found to have fecal impaction, sinus bradycardia, urinary retention, and incidentally COVID19 positive (first positive 1/7, doesn't need isolation).    Problem list:  Generalized weakness/FTT  Fecal impaction  Urinary retention  Sinus bradycardia  COVID19 - asymptomatic - first positive 1/7 - does not need isolation  Lennox-Gastaut syndrome  Developmental delay  Recurrent UTIs    Suspect mentally she is at or near her baseline. Able to have BM last night. Continue aggressive bowel regimen. Urinary retention has improved with BM. F/u usual workup with B12, TSH, etc. PT rec [facility] - should be medically ready by tomorrow.
66F with developmental delay with PMHx of epilepsy (Lennox Gastaut sydnrome?) and chronic UTI as per old chart (unable to obtain further history as both she and her son are poor historians) presents to the ED via EMS for weakness, abdominal pain and headache found to be bradycardic and fecally impacted. 
66F with developmental delay with PMHx of epilepsy (Lennox Gastaut sydnrome?) and chronic UTI as per old chart (unable to obtain further history as both she and her son are poor historians) presents to the ED via EMS for weakness, abdominal pain and headache found to be bradycardic and fecally impacted.

## 2022-01-31 NOTE — DISCHARGE NOTE PROVIDER - NSDCCPCAREPLAN_GEN_ALL_CORE_FT
PRINCIPAL DISCHARGE DIAGNOSIS  Diagnosis: Weakness  Assessment and Plan of Treatment: You were brought to the hospital because you were feeling weak and more tired than normal. In the past this had been attributed to seizures and/or UTIs, however you did not have any witnessed seizure activity and your urine studies were negative. Instead, we believe this is because you were severely constipated. The constipation also lead to you being unable to urinate, and those two things together can make you feel very fatigued and weak. Once you had a large bowel movement, you were able to urinate on your own and your energy/weakness improved. We are sending you home on a new medication called senna, which you should take every night. Please eat a balanced diet, if you are not having bowel movements regularly, please call your doctor      SECONDARY DISCHARGE DIAGNOSES  Diagnosis: Fecal impaction  Assessment and Plan of Treatment: As described above, we believe your symptoms are all caused by constipation, you should take senna every night before bed.    Diagnosis: Epilepsy  Assessment and Plan of Treatment: You have a history of epilepsy and see Dr Jeter at [facility]. While you were here, we were unable to give you your Xcopri medication since we do not have it in our hospital. We gave you your regular dose of cogentin. You did not have any seizure activity while here. Please follow up with Dr. Jeter in the next month     PRINCIPAL DISCHARGE DIAGNOSIS  Diagnosis: Weakness  Assessment and Plan of Treatment: You were brought to the hospital because you were feeling weak and more tired than normal. In the past this had been attributed to seizures and/or UTIs, however you did not have any witnessed seizure activity and your urine studies were negative. Instead, we believe this is because you were severely constipated. The constipation also lead to you being unable to urinate, and those two things together can make you feel very fatigued and weak. Once you had a large bowel movement, you were able to urinate on your own and your energy/weakness improved. We are sending you home with a few new medications. Senna and miralax are two different medications which you should take every day to help prevent constipation. Another medication dulcolax, should be taken only as needed, if you have not had a bowel movement in 2-3 days. All 3 medications were sent to your pharmacy. Please eat a balanced diet, if you are not having bowel movements regularly, please call your doctor      SECONDARY DISCHARGE DIAGNOSES  Diagnosis: Fecal impaction  Assessment and Plan of Treatment: As described above, we believe your symptoms are all caused by constipation, you should take senna every night before bed.    Diagnosis: Epilepsy  Assessment and Plan of Treatment: You have a history of epilepsy and see Dr Jeter at [facility]. While you were here, we were unable to give you your Xcopri medication since we do not have it in our hospital. We gave you your regular dose of cogentin. You did not have any seizure activity while here. Please follow up with Dr. Jeter in the next month    Diagnosis: HTN (hypertension)  Assessment and Plan of Treatment: You take two medications at home, lisinopril and metoprolol, for high blood pressure. When you first came to the hospital, your blood pressure was normal and your heart rate was low, in the 50s, therefore we held off on giving those medications. Your heart rate remained low and your blood pressure stayed normal so the medications were never restarted. We are recommending that you continue the lisinopril at home but stop taking the metoprolol, since metoprolol can lower your blood pressure. Please follow up with your cardiologist Dr Cameron about restarting metoprolol.     PRINCIPAL DISCHARGE DIAGNOSIS  Diagnosis: Weakness  Assessment and Plan of Treatment: You were brought to the hospital because you were feeling weak and more tired than normal. In the past this had been attributed to seizures and/or UTIs, however you did not have any witnessed seizure activity and your urine studies were negative. Instead, we believe this is because you were severely constipated. The constipation also lead to you being unable to urinate, and those two things together can make you feel very fatigued and weak. Once you had a large bowel movement, you were able to urinate on your own and your energy/weakness improved. We are sending you home with a few new medications. Senna and miralax are two different medications which you should take every day to help prevent constipation. Another medication dulcolax, should be taken only as needed, if you have not had a bowel movement in 2-3 days. All 3 medications were sent to your pharmacy, you should stop taking them if you develop diarrhea. Please eat a balanced diet, if you are not having bowel movements regularly, please call your doctor      SECONDARY DISCHARGE DIAGNOSES  Diagnosis: Fecal impaction  Assessment and Plan of Treatment: As described above, we believe your symptoms are all caused by constipation, you should take senna every night before bed, miralax daily and dulcolax as needed if you do not have a bowel movement for 2-3 days. Please stop taking the senna and miralax if you develop diarrhea    Diagnosis: Epilepsy  Assessment and Plan of Treatment: You have a history of epilepsy and see Dr Jeter at [facility]. While you were here, we were unable to give you your Xcopri medication since we do not have it in our hospital. We gave you your regular dose of cogentin. You did not have any seizure activity while here. Please follow up with Dr. Jeter in the next month    Diagnosis: HTN (hypertension)  Assessment and Plan of Treatment: You take two medications at home, lisinopril and metoprolol, for high blood pressure. When you first came to the hospital, your blood pressure was normal and your heart rate was low, in the 50s, therefore we held off on giving those medications. Your heart rate remained low and your blood pressure stayed normal so the medications were never restarted. We are recommending that you continue the lisinopril at home but stop taking the metoprolol, since metoprolol can lower your blood pressure. Please follow up with your cardiologist Dr Cameron about restarting metoprolol.     PRINCIPAL DISCHARGE DIAGNOSIS  Diagnosis: Weakness  Assessment and Plan of Treatment: You were brought to the hospital because you were feeling weak and more tired than normal. In the past this had been attributed to seizures and/or UTIs, however you did not have any witnessed seizure activity and your urine studies were negative. Instead, we believe this is because you were severely constipated. The constipation also lead to you being unable to urinate, and those two things together can make you feel very fatigued and weak. Once you had a large bowel movement, you were able to urinate on your own and your energy/weakness improved. We are sending you home with a few new medications. Senna and miralax are two different medications which you should take every day to help prevent constipation. Another medication dulcolax, should be taken only as needed, if you have not had a bowel movement in 2-3 days. All 3 medications were sent to your pharmacy, you should stop taking them if you develop diarrhea. Please eat a balanced diet, if you are not having bowel movements regularly, please call your doctor      SECONDARY DISCHARGE DIAGNOSES  Diagnosis: Fecal impaction  Assessment and Plan of Treatment: As described above, we believe your symptoms are all caused by constipation, you should take senna every night before bed, miralax daily and dulcolax as needed if you do not have a bowel movement for 2-3 days. Please stop taking the senna and miralax if you develop diarrhea    Diagnosis: Epilepsy  Assessment and Plan of Treatment: You have a history of epilepsy and see Dr Jeter at [facility]. While you were here, we were unable to give you your Xcopri medication since we do not have it in our hospital. We gave you your regular dose of cogentin. You did not have any seizure activity while here. Please follow up with Dr. Jeter in the next month    Diagnosis: HTN (hypertension)  Assessment and Plan of Treatment: You take two medications at home, lisinopril and metoprolol, for high blood pressure. When you first came to the hospital, your blood pressure was normal and your heart rate was low, in the 50s, therefore we held off on giving those medications. Your heart rate remained low and your blood pressure stayed normal so the medications were never restarted. We are recommending that you continue the lisinopril at home but stop taking the metoprolol, since metoprolol can lower your heart rate and blood pressure. Please follow up with your cardiologist Dr Cameron about restarting metoprolol.     PRINCIPAL DISCHARGE DIAGNOSIS  Diagnosis: Weakness  Assessment and Plan of Treatment: You were brought to the hospital because you were feeling weak and more tired than normal. In the past this had been attributed to seizures and/or UTIs, however you did not have any witnessed seizure activity and your urine studies were negative. Instead, we believe this is because you were severely constipated. The constipation also lead to you being unable to urinate, and those two things together can make you feel very fatigued and weak. Once you had a large bowel movement, you were able to urinate on your own and your energy/weakness improved. We are sending you home with a few new medications. Senna and miralax are two different medications which you should take every day to help prevent constipation. Another medication dulcolax, should be taken only as needed, if you have not had a bowel movement in 2-3 days. All 3 medications were sent to your pharmacy, you should stop taking them if you develop diarrhea. Please eat a balanced diet, if you are not having bowel movements regularly, please call your doctor      SECONDARY DISCHARGE DIAGNOSES  Diagnosis: Fecal impaction  Assessment and Plan of Treatment: As described above, we believe your symptoms are all caused by constipation, you should take senna every night before bed, miralax daily and dulcolax as needed if you do not have a bowel movement for 2-3 days. Please stop taking the senna and miralax if you develop diarrhea    Diagnosis: Epilepsy  Assessment and Plan of Treatment: You have a history of epilepsy and see Dr Jeter at [facility]. While you were here, we were unable to give you your Xcopri medication since we do not have it in our hospital. We gave you your regular dose of clobazm. You did not have any seizure activity while here. Please follow up with Dr. Jeter in the next month    Diagnosis: HTN (hypertension)  Assessment and Plan of Treatment: You take two medications at home, lisinopril and metoprolol, for high blood pressure. When you first came to the hospital, your blood pressure was normal and your heart rate was low, in the 50s, therefore we held off on giving those medications. Your heart rate remained low and your blood pressure stayed normal so the medications were never restarted. We are recommending that you continue the lisinopril at home but stop taking the metoprolol, since metoprolol can lower your heart rate and blood pressure. Please follow up with your cardiologist Dr Cameron about restarting metoprolol.

## 2022-01-31 NOTE — PROGRESS NOTE ADULT - PROBLEM SELECTOR PLAN 2
Patient presented to the ED with weakness and abdominal pain, found to be bradycardic with HR 48-51. Initially cardiology was consulted by ED, who said it was not primary. Patient appears lethargic. Patient on metoprolol 25mg at home. On previous admission to ED on 1/7, patient's HR was 60-70. Currently hemodynamically stable. EKG showing sinus bradycardia.   Apparently has hx of bradycardia, per [facility]. Asymptomatic  - Lactate wnl, patient does not appear to be hypoperfusing  - Holding toprol 25 at this time
Patient presented to the ED with weakness and abdominal pain, found to be bradycardic with HR 48-51. Initially cardiology was consulted by ED, who said it was not primary. Patient appears lethargic. Patient on metoprolol 25mg at home. On previous admission to ED on 1/7, patient's HR was 60-70. Currently hemodynamically stable. EKG showing sinus bradycardia.   Apparently has hx of bradycardia, per [facility]. Asymptomatic  - F/u TSH  - Lactate wnl, patient does not appear to be hypoperfusing  - Holding toprol 25 at this time

## 2022-01-31 NOTE — CONSULT NOTE ADULT - ASSESSMENT
per Internal Medicine    65 YO F with history of developmental delay, epilepsy/Lennox-Gastaut, recurrent UTI admitted for weakness, abdominal pain, and headache found to have fecal impaction, sinus bradycardia, urinary retention, and incidentally COVID19 positive (first positive 1/7, doesn't need isolation).    Problem list:  Generalized weakness/FTT  Fecal impaction  Urinary retention  Sinus bradycardia  COVID19 - asymptomatic - first positive 1/7 - does not need isolation  Lennox-Gastaut syndrome  Developmental delay  Recurrent UTIs

## 2022-01-31 NOTE — PROGRESS NOTE ADULT - SUBJECTIVE AND OBJECTIVE BOX
INTERVAL HPI/OVERNIGHT EVENTS:  Patient was seen and examined at bedside. No overnight events, w/ good UOP, no BM. Denies HA, CP, palpitations, SOB, cough, abdominal pain, N/V.    VITAL SIGNS:  T(F): 98.2 (01-31-22 @ 12:13)  HR: 69 (01-31-22 @ 12:13)  BP: 101/60 (01-31-22 @ 12:13)  RR: 18 (01-31-22 @ 12:13)  SpO2: 96% (01-31-22 @ 12:13)  Wt(kg): --    PHYSICAL EXAM:  General: speaking slowly, NAD  Head: NC/AT  Eyes: PERRL, EOMI, anicteric sclera  ENT: no nasal discharge; uvula midline, no oropharyngeal erythema or exudates; MMM  Neck: supple; no JVD or thyromegaly  Respiratory: CTA B/L; no W/R/R, no retractions  Cardiac: +S1/S2; RRR, bradycardic   Gastrointestinal: abdomen distended; TTP diffusely, suprapubic fullness, +BSx4  Back: spine midline, no bony tenderness or step-offs; no CVAT B/L  Extremities: cool extremities, no edema  Musculoskeletal: NROM x4; no joint swelling, tenderness or erythema  Vascular: 2+ radial, femoral, DP/PT pulses B/L  Neurologic: AAOx2 (to self and year); CNII-XII grossly intact; no focal deficits, 5/5 strength in all extremities  Psychiatric: Patient talks very slow and intermittently does not make sense    MEDICATIONS  (STANDING):  atorvastatin 20 milliGRAM(s) Oral at bedtime  cloBAZam 10 milliGRAM(s) Oral every 24 hours  cloBAZam 5 milliGRAM(s) Oral every 24 hours  cyanocobalamin 1000 MICROGram(s) Oral daily  enoxaparin Injectable 40 milliGRAM(s) SubCutaneous every 24 hours  magnesium citrate Oral Solution 1 Bottle Oral once  nitrofurantoin macrocrystals (MACRODANTIN) 50 milliGRAM(s) Oral every 24 hours  polyethylene glycol 3350 17 Gram(s) Oral every 24 hours  potassium chloride    Tablet ER 40 milliEquivalent(s) Oral once  senna 2 Tablet(s) Oral at bedtime  thiamine 100 milliGRAM(s) Oral daily    MEDICATIONS  (PRN):  acetaminophen     Tablet .. 650 milliGRAM(s) Oral every 6 hours PRN Temp greater or equal to 38C (100.4F), Mild Pain (1 - 3)  melatonin 3 milliGRAM(s) Oral at bedtime PRN Insomnia      Allergies    penicillin (Anaphylaxis)    Intolerances        LABS:                        12.0   4.17  )-----------( 151      ( 30 Jan 2022 08:19 )             38.6     01-31    142  |  105  |  9   ----------------------------<  80  3.6   |  25  |  0.54    Ca    9.0      31 Jan 2022 09:10  Phos  2.9     01-31  Mg     2.1     01-31    TPro  6.4  /  Alb  3.9  /  TBili  0.2  /  DBili  x   /  AST  20  /  ALT  9<L>  /  AlkPhos  127<H>  01-31          RADIOLOGY & ADDITIONAL TESTS:  Reviewed INTERVAL HPI/OVERNIGHT EVENTS:  Patient was seen and examined at bedside. No overnight events, w/ good UOP, no BM. Denies HA, CP, palpitations, SOB, cough, abdominal pain, N/V.    VITAL SIGNS:  T(F): 98.2 (01-31-22 @ 12:13)  HR: 69 (01-31-22 @ 12:13)  BP: 101/60 (01-31-22 @ 12:13)  RR: 18 (01-31-22 @ 12:13)  SpO2: 96% (01-31-22 @ 12:13)  Wt(kg): --    PHYSICAL EXAM:  General: speaking slowly, NAD  Head: NC/AT  Eyes: PERRL, EOMI, anicteric sclera  ENT: no nasal discharge; uvula midline, no oropharyngeal erythema or exudates; MMM  Neck: supple; no JVD or thyromegaly  Respiratory: CTA B/L; no W/R/R, no retractions  Cardiac: +S1/S2; RRR, bradycardic   Gastrointestinal: non-tender, non-distended, no suprapubic fullness +BSx4  Back: spine midline, no bony tenderness or step-offs; no CVAT B/L  Extremities: cool extremities, no edema  Musculoskeletal: NROM x4; no joint swelling, tenderness or erythema  Vascular: 2+ radial, femoral, DP/PT pulses B/L  Neurologic: AAOx2 (to self and year); CNII-XII grossly intact; no focal deficits, 5/5 strength in all extremities  Psychiatric: Patient talks very slow and intermittently does not make sense    MEDICATIONS  (STANDING):  atorvastatin 20 milliGRAM(s) Oral at bedtime  cloBAZam 10 milliGRAM(s) Oral every 24 hours  cloBAZam 5 milliGRAM(s) Oral every 24 hours  cyanocobalamin 1000 MICROGram(s) Oral daily  enoxaparin Injectable 40 milliGRAM(s) SubCutaneous every 24 hours  magnesium citrate Oral Solution 1 Bottle Oral once  nitrofurantoin macrocrystals (MACRODANTIN) 50 milliGRAM(s) Oral every 24 hours  polyethylene glycol 3350 17 Gram(s) Oral every 24 hours  potassium chloride    Tablet ER 40 milliEquivalent(s) Oral once  senna 2 Tablet(s) Oral at bedtime  thiamine 100 milliGRAM(s) Oral daily    MEDICATIONS  (PRN):  acetaminophen     Tablet .. 650 milliGRAM(s) Oral every 6 hours PRN Temp greater or equal to 38C (100.4F), Mild Pain (1 - 3)  melatonin 3 milliGRAM(s) Oral at bedtime PRN Insomnia      Allergies    penicillin (Anaphylaxis)    Intolerances        LABS:                        12.0   4.17  )-----------( 151      ( 30 Jan 2022 08:19 )             38.6     01-31    142  |  105  |  9   ----------------------------<  80  3.6   |  25  |  0.54    Ca    9.0      31 Jan 2022 09:10  Phos  2.9     01-31  Mg     2.1     01-31    TPro  6.4  /  Alb  3.9  /  TBili  0.2  /  DBili  x   /  AST  20  /  ALT  9<L>  /  AlkPhos  127<H>  01-31          RADIOLOGY & ADDITIONAL TESTS:  Reviewed

## 2022-01-31 NOTE — PROGRESS NOTE ADULT - PROBLEM SELECTOR PLAN 5
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 Per her son, she "goes limp" and is atonic when she is having a seizure.   - Takes Clobazam 10mg in AM 15mg in PM at home  - Takes xcopri 200mg in AM and 50mg in PM- try and get meds from son today, we do not have  ---spoke to epilepsy at [facility] [place], said no need to start anything else for now if cant get xcopri

## 2022-01-31 NOTE — DISCHARGE NOTE PROVIDER - NSDCFUADDAPPT_GEN_ALL_CORE_FT
Please schedule an appointment with your primary care doctor and Dr. Kerr for 1-2 weeks after discharge to discuss continued changes to medications

## 2022-02-01 ENCOUNTER — TRANSCRIPTION ENCOUNTER (OUTPATIENT)
Age: 67
End: 2022-02-01

## 2022-02-01 VITALS
OXYGEN SATURATION: 96 % | SYSTOLIC BLOOD PRESSURE: 116 MMHG | TEMPERATURE: 98 F | DIASTOLIC BLOOD PRESSURE: 71 MMHG | RESPIRATION RATE: 18 BRPM | HEART RATE: 63 BPM

## 2022-02-01 LAB
ALBUMIN SERPL ELPH-MCNC: 3.6 G/DL — SIGNIFICANT CHANGE UP (ref 3.3–5)
ALP SERPL-CCNC: 113 U/L — SIGNIFICANT CHANGE UP (ref 40–120)
ALT FLD-CCNC: 12 U/L — SIGNIFICANT CHANGE UP (ref 10–45)
ANION GAP SERPL CALC-SCNC: 8 MMOL/L — SIGNIFICANT CHANGE UP (ref 5–17)
AST SERPL-CCNC: 23 U/L — SIGNIFICANT CHANGE UP (ref 10–40)
BASOPHILS # BLD AUTO: 0.03 K/UL — SIGNIFICANT CHANGE UP (ref 0–0.2)
BASOPHILS NFR BLD AUTO: 0.7 % — SIGNIFICANT CHANGE UP (ref 0–2)
BILIRUB SERPL-MCNC: 0.2 MG/DL — SIGNIFICANT CHANGE UP (ref 0.2–1.2)
BUN SERPL-MCNC: 15 MG/DL — SIGNIFICANT CHANGE UP (ref 7–23)
CALCIUM SERPL-MCNC: 8.8 MG/DL — SIGNIFICANT CHANGE UP (ref 8.4–10.5)
CHLORIDE SERPL-SCNC: 109 MMOL/L — HIGH (ref 96–108)
CO2 SERPL-SCNC: 25 MMOL/L — SIGNIFICANT CHANGE UP (ref 22–31)
CREAT SERPL-MCNC: 0.56 MG/DL — SIGNIFICANT CHANGE UP (ref 0.5–1.3)
EOSINOPHIL # BLD AUTO: 0.28 K/UL — SIGNIFICANT CHANGE UP (ref 0–0.5)
EOSINOPHIL NFR BLD AUTO: 6.6 % — HIGH (ref 0–6)
GLUCOSE SERPL-MCNC: 86 MG/DL — SIGNIFICANT CHANGE UP (ref 70–99)
HCT VFR BLD CALC: 38.6 % — SIGNIFICANT CHANGE UP (ref 34.5–45)
HGB BLD-MCNC: 12.7 G/DL — SIGNIFICANT CHANGE UP (ref 11.5–15.5)
IMM GRANULOCYTES NFR BLD AUTO: 0.2 % — SIGNIFICANT CHANGE UP (ref 0–1.5)
LYMPHOCYTES # BLD AUTO: 1.33 K/UL — SIGNIFICANT CHANGE UP (ref 1–3.3)
LYMPHOCYTES # BLD AUTO: 31.4 % — SIGNIFICANT CHANGE UP (ref 13–44)
MAGNESIUM SERPL-MCNC: 1.9 MG/DL — SIGNIFICANT CHANGE UP (ref 1.6–2.6)
MCHC RBC-ENTMCNC: 32.9 GM/DL — SIGNIFICANT CHANGE UP (ref 32–36)
MCHC RBC-ENTMCNC: 33.2 PG — SIGNIFICANT CHANGE UP (ref 27–34)
MCV RBC AUTO: 100.8 FL — HIGH (ref 80–100)
MONOCYTES # BLD AUTO: 0.38 K/UL — SIGNIFICANT CHANGE UP (ref 0–0.9)
MONOCYTES NFR BLD AUTO: 9 % — SIGNIFICANT CHANGE UP (ref 2–14)
NEUTROPHILS # BLD AUTO: 2.2 K/UL — SIGNIFICANT CHANGE UP (ref 1.8–7.4)
NEUTROPHILS NFR BLD AUTO: 52.1 % — SIGNIFICANT CHANGE UP (ref 43–77)
NRBC # BLD: 0 /100 WBCS — SIGNIFICANT CHANGE UP (ref 0–0)
PHOSPHATE SERPL-MCNC: 2.6 MG/DL — SIGNIFICANT CHANGE UP (ref 2.5–4.5)
PLATELET # BLD AUTO: 177 K/UL — SIGNIFICANT CHANGE UP (ref 150–400)
POTASSIUM SERPL-MCNC: 4.2 MMOL/L — SIGNIFICANT CHANGE UP (ref 3.5–5.3)
POTASSIUM SERPL-SCNC: 4.2 MMOL/L — SIGNIFICANT CHANGE UP (ref 3.5–5.3)
PROT SERPL-MCNC: 6.1 G/DL — SIGNIFICANT CHANGE UP (ref 6–8.3)
RBC # BLD: 3.83 M/UL — SIGNIFICANT CHANGE UP (ref 3.8–5.2)
RBC # FLD: 13 % — SIGNIFICANT CHANGE UP (ref 10.3–14.5)
SODIUM SERPL-SCNC: 142 MMOL/L — SIGNIFICANT CHANGE UP (ref 135–145)
WBC # BLD: 4.23 K/UL — SIGNIFICANT CHANGE UP (ref 3.8–10.5)
WBC # FLD AUTO: 4.23 K/UL — SIGNIFICANT CHANGE UP (ref 3.8–10.5)

## 2022-02-01 PROCEDURE — 82550 ASSAY OF CK (CPK): CPT

## 2022-02-01 PROCEDURE — 82962 GLUCOSE BLOOD TEST: CPT

## 2022-02-01 PROCEDURE — 83735 ASSAY OF MAGNESIUM: CPT

## 2022-02-01 PROCEDURE — 85610 PROTHROMBIN TIME: CPT

## 2022-02-01 PROCEDURE — 86803 HEPATITIS C AB TEST: CPT

## 2022-02-01 PROCEDURE — 97530 THERAPEUTIC ACTIVITIES: CPT

## 2022-02-01 PROCEDURE — 97116 GAIT TRAINING THERAPY: CPT

## 2022-02-01 PROCEDURE — 99239 HOSP IP/OBS DSCHRG MGMT >30: CPT

## 2022-02-01 PROCEDURE — 82607 VITAMIN B-12: CPT

## 2022-02-01 PROCEDURE — 85730 THROMBOPLASTIN TIME PARTIAL: CPT

## 2022-02-01 PROCEDURE — 96374 THER/PROPH/DIAG INJ IV PUSH: CPT

## 2022-02-01 PROCEDURE — 83605 ASSAY OF LACTIC ACID: CPT

## 2022-02-01 PROCEDURE — 70450 CT HEAD/BRAIN W/O DYE: CPT

## 2022-02-01 PROCEDURE — 84436 ASSAY OF TOTAL THYROXINE: CPT

## 2022-02-01 PROCEDURE — 81003 URINALYSIS AUTO W/O SCOPE: CPT

## 2022-02-01 PROCEDURE — 82746 ASSAY OF FOLIC ACID SERUM: CPT

## 2022-02-01 PROCEDURE — 87635 SARS-COV-2 COVID-19 AMP PRB: CPT

## 2022-02-01 PROCEDURE — 36415 COLL VENOUS BLD VENIPUNCTURE: CPT

## 2022-02-01 PROCEDURE — 85025 COMPLETE CBC W/AUTO DIFF WBC: CPT

## 2022-02-01 PROCEDURE — 93005 ELECTROCARDIOGRAM TRACING: CPT

## 2022-02-01 PROCEDURE — 71045 X-RAY EXAM CHEST 1 VIEW: CPT

## 2022-02-01 PROCEDURE — 97161 PT EVAL LOW COMPLEX 20 MIN: CPT

## 2022-02-01 PROCEDURE — 80307 DRUG TEST PRSMV CHEM ANLYZR: CPT

## 2022-02-01 PROCEDURE — 80053 COMPREHEN METABOLIC PANEL: CPT

## 2022-02-01 PROCEDURE — 74177 CT ABD & PELVIS W/CONTRAST: CPT

## 2022-02-01 PROCEDURE — 99285 EMERGENCY DEPT VISIT HI MDM: CPT | Mod: 25

## 2022-02-01 PROCEDURE — 84484 ASSAY OF TROPONIN QUANT: CPT

## 2022-02-01 PROCEDURE — 84443 ASSAY THYROID STIM HORMONE: CPT

## 2022-02-01 PROCEDURE — 84100 ASSAY OF PHOSPHORUS: CPT

## 2022-02-01 PROCEDURE — 86780 TREPONEMA PALLIDUM: CPT

## 2022-02-01 PROCEDURE — 87040 BLOOD CULTURE FOR BACTERIA: CPT

## 2022-02-01 PROCEDURE — 87086 URINE CULTURE/COLONY COUNT: CPT

## 2022-02-01 PROCEDURE — 96375 TX/PRO/DX INJ NEW DRUG ADDON: CPT

## 2022-02-01 RX ORDER — METOPROLOL TARTRATE 50 MG
1 TABLET ORAL
Qty: 0 | Refills: 0 | DISCHARGE

## 2022-02-01 RX ORDER — POLYETHYLENE GLYCOL 3350 17 G/17G
17 POWDER, FOR SOLUTION ORAL
Qty: 510 | Refills: 0
Start: 2022-02-01 | End: 2022-03-02

## 2022-02-01 RX ORDER — DOCUSATE SODIUM 100 MG
1 CAPSULE ORAL
Qty: 14 | Refills: 0
Start: 2022-02-01 | End: 2022-02-14

## 2022-02-01 RX ORDER — SENNA PLUS 8.6 MG/1
1 TABLET ORAL
Qty: 30 | Refills: 3
Start: 2022-02-01 | End: 2022-05-31

## 2022-02-01 RX ORDER — SENNA PLUS 8.6 MG/1
1 TABLET ORAL
Qty: 0 | Refills: 0 | DISCHARGE

## 2022-02-01 RX ADMIN — Medication 100 MILLIGRAM(S): at 12:27

## 2022-02-01 RX ADMIN — POLYETHYLENE GLYCOL 3350 17 GRAM(S): 17 POWDER, FOR SOLUTION ORAL at 12:27

## 2022-02-01 RX ADMIN — ENOXAPARIN SODIUM 40 MILLIGRAM(S): 100 INJECTION SUBCUTANEOUS at 08:00

## 2022-02-01 RX ADMIN — CLOBAZAM 10 MILLIGRAM(S): 10 TABLET ORAL at 06:07

## 2022-02-01 RX ADMIN — PREGABALIN 1000 MICROGRAM(S): 225 CAPSULE ORAL at 12:27

## 2022-02-01 NOTE — DISCHARGE NOTE NURSING/CASE MANAGEMENT/SOCIAL WORK - NSDCPEFALRISK_GEN_ALL_CORE
For information on Fall & Injury Prevention, visit: [email or web address] OR  [email or web address] OR  https://www.cdc.gov/steadi/patient.html

## 2022-02-01 NOTE — DISCHARGE NOTE NURSING/CASE MANAGEMENT/SOCIAL WORK - PATIENT PORTAL LINK FT
You can access the FollowMyHealth Patient Portal offered by [facility] by registering at the following website: [email or web address]. By joining [employer]’s FollowMyHealth portal, you will also be able to view your health information using other applications (apps) compatible with our system.

## 2022-02-07 DIAGNOSIS — R33.9 RETENTION OF URINE, UNSPECIFIED: ICD-10-CM

## 2022-02-07 DIAGNOSIS — R62.7 ADULT FAILURE TO THRIVE: ICD-10-CM

## 2022-02-07 DIAGNOSIS — R62.50 UNSPECIFIED LACK OF EXPECTED NORMAL PHYSIOLOGICAL DEVELOPMENT IN CHILDHOOD: ICD-10-CM

## 2022-02-07 DIAGNOSIS — G40.812 LENNOX-GASTAUT SYNDROME, NOT INTRACTABLE, WITHOUT STATUS EPILEPTICUS: ICD-10-CM

## 2022-02-07 DIAGNOSIS — R00.1 BRADYCARDIA, UNSPECIFIED: ICD-10-CM

## 2022-02-07 DIAGNOSIS — Z88.0 ALLERGY STATUS TO PENICILLIN: ICD-10-CM

## 2022-02-07 DIAGNOSIS — Z87.440 PERSONAL HISTORY OF URINARY (TRACT) INFECTIONS: ICD-10-CM

## 2022-02-07 DIAGNOSIS — K59.00 CONSTIPATION, UNSPECIFIED: ICD-10-CM

## 2022-02-07 DIAGNOSIS — U07.1 COVID-19: ICD-10-CM

## 2022-03-31 NOTE — PATIENT PROFILE ADULT - PRO INTERPRETER NEED 2
Patient stable without further seizure events and discharged per neurology recommendations with home AEDs and with follow-ups planned. Patient is receptive to alcohol rehab / cessation and would like resources and referrals in that regard, but not ready for smoking cessation. Given resources and outpatient followups scheduled.   English

## 2023-02-16 NOTE — PROGRESS NOTE ADULT - PROBLEM SELECTOR PLAN 7
F: s/p 2L NS  E: Replete PRN  N: regular diet  DVT PPx: Lovenox  GI PPx: None    Dispo: [facility]
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
F: s/p 2L NS  E: Replete PRN  N: regular diet  DVT PPx: Lovenox  GI PPx: None    Dispo: [facility]

## 2023-04-05 NOTE — PATIENT PROFILE ADULT - NSPROPASSIVESMOKEEXPOSURE_GEN_A_NUR
Brandon Harris (:  1947) is a 77 y.o. female, Established patient, here for evaluation of the following chief complaint(s): Other (Rt foot and ankle are swollen ongoing 4 days was in home care for broken hip on 23)      Vitals:    23 1329   BP: 126/70   Pulse: 73   Temp: 97.8 °F (36.6 °C)   SpO2: 95%       ASSESSMENT/PLAN:  1. Edema of right foot      -    compression stockings      -    elevate leg above heart level overnight, update PCP on swelling      -    follow up with PCP this week  2. At high risk for falls        Return in about 1 day (around 2023) for follow up with PCP. SUBJECTIVE/OBJECTIVE:    Foot Swelling   The pain is present in the right foot and right ankle. This is a new problem. Episode onset: x4 days swelling to right foot and ankle. There has been no history of extremity trauma (But pt broke right hip 2023, also had surgery on right foot/ankle 20+ years ago). The problem occurs constantly. The problem has been unchanged. The quality of the pain is described as aching. The pain is at a severity of 3/10. The pain is mild. Associated symptoms include joint swelling. Pertinent negatives include no fever, numbness or tingling. The symptoms are aggravated by standing. She has tried nothing for the symptoms. Review of Systems   Constitutional:  Negative for chills, fatigue and fever. Respiratory:  Negative for cough, shortness of breath and wheezing. Cardiovascular:  Positive for leg swelling (right foot and ankle). Negative for palpitations. Gastrointestinal:  Negative for nausea and vomiting. Musculoskeletal:  Negative for arthralgias, back pain and myalgias. Skin:  Negative for color change, pallor and wound. Neurological:  Negative for dizziness, tingling, light-headedness, numbness and headaches. Physical Exam  Vitals reviewed. Constitutional:       General: She is not in acute distress. Appearance: She is not ill-appearing. Unknown

## 2024-01-11 NOTE — DISCHARGE NOTE PROVIDER - HOSPITAL COURSE
----- Message from Selina Nieves sent at 1/11/2024  3:27 PM CST -----  Pt has been screaming in pain for the past few days. Mom said he's in so much pain    Camelia  [phone]   disc.     66F with developmental delay with PMHx of epilepsy (Lennox Gastaut sydnrome?) and chronic UTI as per old chart (unable to obtain further history as both she and her son are poor historians) presents to the ED via EMS for weakness, abdominal pain and headache found to be bradycardic and fecally impacted.     #weakness  Patient came to the hospital for a few days of increased weakness and fatigue. Patient found to have significant stool burden, which was considered the cause of her weakness and urinary retention. Patient received 2 tap water enemas, with one large bowel movement, leading to improvement in energy and urinary retention. Patient with good strength in all extremities. PT saw patient and recommended DARCY, but considering patient has 24/7 HHA, decision was made by son and  to discharge to home w/ HHA and home PT.  -f/u Dr Jeter at [facility]    #constipation  Patient found to have considerable fecal impaction on admission, received lactulose, dulcolax x1, started on senna/miralax, received 2 tap water enemas with eventual bowel movement, leading to resolution of symptoms of weakness and urinary retention  -c/w senna at home    #urinary retention  Patient found to be retaining urine 2x during first 24 hours, requiring straight cath. Once patient had BM, she had good UOP on her own, no longer requiring straight caths      #epilepsy  Pt has history of epilepsy, takes cogentin and xcopri at home. Cogentin was continued during hospitalization, xcopri was not available at [facility]. Pt had no witnessed seizure activity  -c/w cogentin and xcopri at home    #chronic UTI  Patient has had similar episodes of weakness in the past, which were often attributed to UTIs, patient was recently startde on nitrofurantoin 50 daily for PPX, which was continued here. UA was negative  -c/w nitrofurantoin    #bradycardia  Patient found to be bradycardic in 40s on admission, was 50s most of her time here. Cards was consulted and had no recommendations. Collateral was obtained that this is her baseline.    New Meds: Senna 66F with developmental delay with PMHx of epilepsy (Lennox Gastaut sydnrome?) and chronic UTI as per old chart (unable to obtain further history as both she and her son are poor historians) presents to the ED via EMS for weakness, abdominal pain and headache found to be bradycardic and fecally impacted.     #weakness  Patient came to the hospital for a few days of increased weakness and fatigue. Patient found to have significant stool burden, which was considered the cause of her weakness and urinary retention. Patient received 2 tap water enemas, with one large bowel movement, leading to improvement in energy and urinary retention. Patient with good strength in all extremities. PT saw patient and recommended DARCY, but considering patient has 24/7 HHA, decision was made by son and  to discharge to home w/ HHA and home PT.  -f/u Dr Jeter at [facility]    #constipation  Patient found to have considerable fecal impaction on admission, received lactulose, dulcolax x1, started on senna/miralax, received 2 tap water enemas with eventual bowel movement, leading to resolution of symptoms of weakness and urinary retention  -c/w senna and miralax at home, dulcolax PRN    #urinary retention  Patient found to be retaining urine 2x during first 24 hours, requiring straight cath. Once patient had BM, she had good UOP on her own, no longer requiring straight caths      #epilepsy  Pt has history of epilepsy, takes cogentin and xcopri at home. Cogentin was continued during hospitalization, xcopri was not available at [facility]. Pt had no witnessed seizure activity  -c/w cogentin and xcopri at home    #chronic UTI  Patient has had similar episodes of weakness in the past, which were often attributed to UTIs, patient was recently startde on nitrofurantoin 50 daily for PPX, which was continued here. UA was negative  -c/w nitrofurantoin    #bradycardia  Patient found to be bradycardic in 40s on admission, was 50s most of her time here. Cards was consulted and had no recommendations. Collateral was obtained that this is her baseline.  -hold metoprolol at home    New Meds: Senna, miralax, dulcolax suppository PRN  Stop: metoprolol 25 #Discharge: do not delete    66F with developmental delay with PMHx of epilepsy (Lennox Gastaut sydnrome?) and chronic UTI as per old chart (unable to obtain further history as both she and her son are poor historians) presents to the ED via EMS for weakness, abdominal pain and headache found to be bradycardic and fecally impacted.     #weakness  Patient came to the hospital for a few days of increased weakness and fatigue. Patient found to have significant stool burden, which was considered the cause of her weakness and urinary retention. Patient received 2 tap water enemas, with one large bowel movement, leading to improvement in energy and urinary retention. Patient with good strength in all extremities. PT saw patient and recommended DARCY, but considering patient has 24/7 HHA, decision was made by son and  to discharge to home w/ HHA and home PT.  -f/u Dr Jeter at [facility]    #constipation  Patient found to have considerable fecal impaction on admission, received lactulose, dulcolax x1, started on senna/miralax, received 2 tap water enemas with eventual bowel movement, leading to resolution of symptoms of weakness and urinary retention  -c/w senna and miralax at home, dulcolax PRN    #urinary retention  Patient found to be retaining urine 2x during first 24 hours, requiring straight cath. Once patient had BM, she had good UOP on her own, no longer requiring straight caths    #epilepsy  Pt has history of epilepsy, takes cogentin and xcopri at home. Cogentin was continued during hospitalization, xcopri was not available at [facility]. Pt had no witnessed seizure activity  -c/w cogentin and xcopri at home    #chronic UTI  Patient has had similar episodes of weakness in the past, which were often attributed to UTIs, patient was recently startde on nitrofurantoin 50 daily for PPX, which was continued here. UA was negative  -c/w nitrofurantoin    #bradycardia  Patient found to be bradycardic in 40s on admission, was 50s most of her time here. Cards was consulted and had no recommendations. Collateral was obtained that this is her baseline.  -hold metoprolol at home    New Meds: Senna, miralax, dulcolax suppository PRN  Stop: metoprolol 25 #Discharge: do not delete    66F with developmental delay with PMHx of epilepsy (Lennox Gastaut sydnrome?) and chronic UTI as per old chart (unable to obtain further history as both she and her son are poor historians) presents to the ED via EMS for weakness, abdominal pain and headache found to be bradycardic and fecally impacted.     #weakness  Patient came to the hospital for a few days of increased weakness and fatigue. Patient found to have significant stool burden, which was considered the cause of her weakness and urinary retention. Patient received 2 tap water enemas, with one large bowel movement, leading to improvement in energy and urinary retention. Patient with good strength in all extremities. PT saw patient and recommended DARCY, but considering patient has 24/7 HHA, decision was made by son and  to discharge to home w/ HHA and home PT.  -f/u Dr Jeter at [facility]    #constipation  Patient found to have considerable fecal impaction on admission, received lactulose, dulcolax x1, started on senna/miralax, received 2 tap water enemas with eventual bowel movement, leading to resolution of symptoms of weakness and urinary retention  -c/w senna and miralax at home, dulcolax PRN    #urinary retention  Patient found to be retaining urine 2x during first 24 hours, requiring straight cath. Once patient had BM, she had good UOP on her own, no longer requiring straight caths    #epilepsy  Pt has history of epilepsy, takes cogentin and xcopri at home. Cogentin was continued during hospitalization, xcopri was not available at [facility]. Pt had no witnessed seizure activity  -c/w cogentin and xcopri at home    #chronic UTI  Patient has had similar episodes of weakness in the past, which were often attributed to UTIs, patient was recently startde on nitrofurantoin 50 daily for PPX, which was continued here. UA was negative  -c/w nitrofurantoin    #bradycardia  Patient found to be bradycardic in 40s on admission, was 50s most of her time here. Cards was consulted, no intervention necessary. Collateral was obtained that this is her baseline.  -hold metoprolol at home    New Meds: Senna, miralax, dulcolax suppository PRN  Stop: metoprolol 25    ATTENDING ATTESTATION  Date of Service: 2/1/22    I interviewed and examined Ermelinda Orellana on the day of discharge and greater than 30 minutes were spent by the team on processing their hospital discharge and coordinating their post-hospital care.     I discussed the care plan with the resident team. I agree with the discharge plan as outlined in the Discharge Summary. I have personally reviewed the above discharge summary and made changes where necessary, and as noted below.    66YOF with history of developmental delay, epilepsy/Lennox-Gastaut syndrome, recurrent UTI admitted for weakness, abdominal pain, and headache found to have fecal impaction, sinus bradycardia, urinary retention, and incidentally COVID19 positive (though first positive 1/7, out of window for isolation). Medical workup for weakness negative, given aggressive bowel regimen and she clinically improved significantly after that. She is stable for d/c home today with continued aggressive bowel regimen and close outpatient followup.    Problem list:  Generalized weakness/FTT  Fecal impaction  Urinary retention  Sinus bradycardia  COVID19 - asymptomatic - first positive 1/7 - does not need isolation  Lennox-Gastaut syndrome  Developmental delay  Recurrent UTIs    Physical exam on day of discharge:  General: pleasant, appropriate, no acute distress. Participating appropriately in interview.  HEENT: NC/AT, MMM  Neck: soft, supple, no lymphadenopathy  Cardiac: regular rhythm, rate 50s-60s, normal s1/s2, no murmurs, rubs, or gallops  Lungs: clear to auscultation bilaterally without wheezes, rales, or rhonchi. Normal work of breathing. Speaking in complete sentences.  Abdomen: soft, nontender, nondistended. Bowel sounds present and normoactive.   Extremities: moving all extremities. No edema.  Neuro: awake, alert, oriented to person, "hospital" and "2022." Follows commands. Moving all extremities. Sensation intact.  Psych: no evidence of AVH.    Hi Walsh MD  Attending Hospitalist #Discharge: do not delete    66F with developmental delay with PMHx of epilepsy (Lennox Gastaut sydnrome?) and chronic UTI as per old chart (unable to obtain further history as both she and her son are poor historians) presents to the ED via EMS for weakness, abdominal pain and headache found to be bradycardic and fecally impacted.     #weakness  Patient came to the hospital for a few days of increased weakness and fatigue. Patient found to have significant stool burden, which was considered the cause of her weakness and urinary retention. Patient received 2 tap water enemas, with one large bowel movement, leading to improvement in energy and urinary retention. Patient with good strength in all extremities. PT saw patient and recommended DARCY, but considering patient has 24/7 HHA, decision was made by son and  to discharge to home w/ HHA and home PT.  -f/[facility] Dr Jeter at [facility]    #constipation  Patient found to have considerable fecal impaction on admission, received lactulose, dulcolax x1, started on senna/miralax, received 2 tap water enemas with eventual bowel movement, leading to resolution of symptoms of weakness and urinary retention  -c/w senna and miralax at home, dulcolax PRN    #urinary retention  Patient found to be retaining urine 2x during first 24 hours, requiring straight cath. Once patient had BM, she had good UOP on her own, no longer requiring straight caths    #epilepsy  Pt has history of epilepsy, takes cogentin and xcopri at home. Cogentin was continued during hospitalization, xcopri was not available at [facility]. Pt had no witnessed seizure activity  -c/w clobazam and xcopri at home    #chronic UTI  Patient has had similar episodes of weakness in the past, which were often attributed to UTIs, patient was recently startde on nitrofurantoin 50 daily for PPX, which was continued here. UA was negative  -c/w nitrofurantoin    #bradycardia  Patient found to be bradycardic in 40s on admission, was 50s most of her time here. Cards was consulted, no intervention necessary. Collateral was obtained that this is her baseline.  -hold metoprolol at home    New Meds: Senna, miralax, dulcolax suppository PRN  Stop: metoprolol 25    ATTENDING ATTESTATION  Date of Service: 2/1/22    I interviewed and examined Ermelinda Orellana on the day of discharge and greater than 30 minutes were spent by the team on processing their hospital discharge and coordinating their post-hospital care.     I discussed the care plan with the resident team. I agree with the discharge plan as outlined in the Discharge Summary. I have personally reviewed the above discharge summary and made changes where necessary, and as noted below.    66YOF with history of developmental delay, epilepsy/Lennox-Gastaut syndrome, recurrent UTI admitted for weakness, abdominal pain, and headache found to have fecal impaction, sinus bradycardia, urinary retention, and incidentally COVID19 positive (though first positive 1/7, out of window for isolation). Medical workup for weakness negative, given aggressive bowel regimen and she clinically improved significantly after that. She is stable for d/c home today with continued aggressive bowel regimen and close outpatient followup.    Problem list:  Generalized weakness/FTT  Fecal impaction  Urinary retention  Sinus bradycardia  COVID19 - asymptomatic - first positive 1/7 - does not need isolation  Lennox-Gastaut syndrome  Developmental delay  Recurrent UTIs    Physical exam on day of discharge:  General: pleasant, appropriate, no acute distress. Participating appropriately in interview.  HEENT: NC/AT, MMM  Neck: soft, supple, no lymphadenopathy  Cardiac: regular rhythm, rate 50s-60s, normal s1/s2, no murmurs, rubs, or gallops  Lungs: clear to auscultation bilaterally without wheezes, rales, or rhonchi. Normal work of breathing. Speaking in complete sentences.  Abdomen: soft, nontender, nondistended. Bowel sounds present and normoactive.   Extremities: moving all extremities. No edema.  Neuro: awake, alert, oriented to person, "hospital" and "2022." Follows commands. Moving all extremities. Sensation intact.  Psych: no evidence of AVH.    Hi Walsh MD  Attending Hospitalist

## 2024-02-22 ENCOUNTER — EMERGENCY (EMERGENCY)
Facility: HOSPITAL | Age: 69
LOS: 1 days | Discharge: HOME | End: 2024-02-22
Admitting: EMERGENCY MEDICINE
Payer: MEDICARE

## 2024-02-22 VITALS
OXYGEN SATURATION: 94 % | HEART RATE: 56 BPM | WEIGHT: 130.07 LBS | TEMPERATURE: 98 F | DIASTOLIC BLOOD PRESSURE: 74 MMHG | RESPIRATION RATE: 18 BRPM | SYSTOLIC BLOOD PRESSURE: 124 MMHG

## 2024-02-22 VITALS
HEART RATE: 49 BPM | OXYGEN SATURATION: 96 % | RESPIRATION RATE: 18 BRPM | SYSTOLIC BLOOD PRESSURE: 127 MMHG | TEMPERATURE: 98 F | DIASTOLIC BLOOD PRESSURE: 61 MMHG

## 2024-02-22 LAB
ANION GAP SERPL CALC-SCNC: 8 MMOL/L — LOW (ref 9–16)
APPEARANCE UR: ABNORMAL
BACTERIA # UR AUTO: ABNORMAL /HPF
BASOPHILS # BLD AUTO: 0.03 K/UL — SIGNIFICANT CHANGE UP (ref 0–0.2)
BASOPHILS NFR BLD AUTO: 0.6 % — SIGNIFICANT CHANGE UP (ref 0–2)
BILIRUB UR-MCNC: NEGATIVE — SIGNIFICANT CHANGE UP
BUN SERPL-MCNC: 15 MG/DL — SIGNIFICANT CHANGE UP (ref 7–23)
CALCIUM SERPL-MCNC: 9.1 MG/DL — SIGNIFICANT CHANGE UP (ref 8.5–10.5)
CHLORIDE SERPL-SCNC: 101 MMOL/L — SIGNIFICANT CHANGE UP (ref 96–108)
CO2 SERPL-SCNC: 26 MMOL/L — SIGNIFICANT CHANGE UP (ref 22–31)
COLOR SPEC: YELLOW — SIGNIFICANT CHANGE UP
CREAT SERPL-MCNC: 0.49 MG/DL — LOW (ref 0.5–1.3)
DIFF PNL FLD: ABNORMAL
EGFR: 103 ML/MIN/1.73M2 — SIGNIFICANT CHANGE UP
EOSINOPHIL # BLD AUTO: 0.04 K/UL — SIGNIFICANT CHANGE UP (ref 0–0.5)
EOSINOPHIL NFR BLD AUTO: 0.8 % — SIGNIFICANT CHANGE UP (ref 0–6)
EPI CELLS # UR: PRESENT
GLUCOSE SERPL-MCNC: 103 MG/DL — HIGH (ref 70–99)
GLUCOSE UR QL: NEGATIVE MG/DL — SIGNIFICANT CHANGE UP
HCT VFR BLD CALC: 46.9 % — HIGH (ref 34.5–45)
HGB BLD-MCNC: 15.3 G/DL — SIGNIFICANT CHANGE UP (ref 11.5–15.5)
HYALINE CASTS # UR AUTO: PRESENT
IMM GRANULOCYTES NFR BLD AUTO: 0.4 % — SIGNIFICANT CHANGE UP (ref 0–0.9)
KETONES UR-MCNC: NEGATIVE MG/DL — SIGNIFICANT CHANGE UP
LEUKOCYTE ESTERASE UR-ACNC: ABNORMAL
LYMPHOCYTES # BLD AUTO: 1.54 K/UL — SIGNIFICANT CHANGE UP (ref 1–3.3)
LYMPHOCYTES # BLD AUTO: 31.5 % — SIGNIFICANT CHANGE UP (ref 13–44)
MAGNESIUM SERPL-MCNC: 2.1 MG/DL — SIGNIFICANT CHANGE UP (ref 1.6–2.6)
MCHC RBC-ENTMCNC: 32.6 GM/DL — SIGNIFICANT CHANGE UP (ref 32–36)
MCHC RBC-ENTMCNC: 33.8 PG — SIGNIFICANT CHANGE UP (ref 27–34)
MCV RBC AUTO: 103.8 FL — HIGH (ref 80–100)
MONOCYTES # BLD AUTO: 0.38 K/UL — SIGNIFICANT CHANGE UP (ref 0–0.9)
MONOCYTES NFR BLD AUTO: 7.8 % — SIGNIFICANT CHANGE UP (ref 2–14)
NEUTROPHILS # BLD AUTO: 2.88 K/UL — SIGNIFICANT CHANGE UP (ref 1.8–7.4)
NEUTROPHILS NFR BLD AUTO: 58.9 % — SIGNIFICANT CHANGE UP (ref 43–77)
NITRITE UR-MCNC: NEGATIVE — SIGNIFICANT CHANGE UP
NRBC # BLD: 0 /100 WBCS — SIGNIFICANT CHANGE UP (ref 0–0)
PH UR: 7.5 — SIGNIFICANT CHANGE UP (ref 5–8)
PHOSPHATE SERPL-MCNC: 4.2 MG/DL — SIGNIFICANT CHANGE UP (ref 2.5–4.5)
PLATELET # BLD AUTO: 172 K/UL — SIGNIFICANT CHANGE UP (ref 150–400)
POTASSIUM SERPL-MCNC: 3.4 MMOL/L — LOW (ref 3.5–5.3)
POTASSIUM SERPL-MCNC: 5.9 MMOL/L — HIGH (ref 3.5–5.3)
POTASSIUM SERPL-SCNC: 3.4 MMOL/L — LOW (ref 3.5–5.3)
POTASSIUM SERPL-SCNC: 5.9 MMOL/L — HIGH (ref 3.5–5.3)
PROT UR-MCNC: NEGATIVE MG/DL — SIGNIFICANT CHANGE UP
RBC # BLD: 4.52 M/UL — SIGNIFICANT CHANGE UP (ref 3.8–5.2)
RBC # FLD: 13.9 % — SIGNIFICANT CHANGE UP (ref 10.3–14.5)
RBC CASTS # UR COMP ASSIST: 2 /HPF — SIGNIFICANT CHANGE UP (ref 0–4)
SODIUM SERPL-SCNC: 135 MMOL/L — SIGNIFICANT CHANGE UP (ref 132–145)
SP GR SPEC: 1.01 — SIGNIFICANT CHANGE UP (ref 1–1.03)
UROBILINOGEN FLD QL: 0.2 MG/DL — SIGNIFICANT CHANGE UP (ref 0.2–1)
WBC # BLD: 4.89 K/UL — SIGNIFICANT CHANGE UP (ref 3.8–10.5)
WBC # FLD AUTO: 4.89 K/UL — SIGNIFICANT CHANGE UP (ref 3.8–10.5)
WBC UR QL: 35 /HPF — HIGH (ref 0–5)

## 2024-02-22 PROCEDURE — 71045 X-RAY EXAM CHEST 1 VIEW: CPT | Mod: 26

## 2024-02-22 PROCEDURE — 70450 CT HEAD/BRAIN W/O DYE: CPT | Mod: 26,MC

## 2024-02-22 PROCEDURE — 99285 EMERGENCY DEPT VISIT HI MDM: CPT

## 2024-02-22 RX ORDER — CIPROFLOXACIN LACTATE 400MG/40ML
10 VIAL (ML) INTRAVENOUS
Qty: 2 | Refills: 0
Start: 2024-02-22 | End: 2024-02-28

## 2024-02-22 RX ORDER — CIPROFLOXACIN LACTATE 400MG/40ML
400 VIAL (ML) INTRAVENOUS ONCE
Refills: 0 | Status: COMPLETED | OUTPATIENT
Start: 2024-02-22 | End: 2024-02-22

## 2024-02-22 RX ORDER — SODIUM CHLORIDE 9 MG/ML
1000 INJECTION INTRAMUSCULAR; INTRAVENOUS; SUBCUTANEOUS ONCE
Refills: 0 | Status: COMPLETED | OUTPATIENT
Start: 2024-02-22 | End: 2024-02-22

## 2024-02-22 RX ADMIN — Medication 200 MILLIGRAM(S): at 19:15

## 2024-02-22 RX ADMIN — SODIUM CHLORIDE 1000 MILLILITER(S): 9 INJECTION INTRAMUSCULAR; INTRAVENOUS; SUBCUTANEOUS at 19:51

## 2024-02-22 NOTE — ED ADULT NURSE NOTE - OBJECTIVE STATEMENT
Patient is a 68 y/o F c/o ams. patient bibems from nursing care for possible AMS. patient is accompanied w home health aide. Aide reports at baseline patient is non verbal. Aide reports pt has a pmhx of seizures, htn, and chronic utis. Aide reports patient vomitted this morning and noticed that patient "stares off into space". patient does not respond to painful stimuli. patient does not have any obvious injuries to body. Notable red markings around lips that aide reports that she does not know where they are from.    Timur King #[id number]

## 2024-02-22 NOTE — ED PROVIDER NOTE - PATIENT PORTAL LINK FT
You can access the FollowMyHealth Patient Portal offered by Health system by registering at the following website: [email or web address]. By joining [facility]’s FollowMyHealth portal, you will also be able to view your health information using other applications (apps) compatible with our system.

## 2024-02-22 NOTE — ED ADULT TRIAGE NOTE - CHIEF COMPLAINT QUOTE
Pt BIBA from Nursing Home for possible AMS. HHA called because she felt the patient was less responsive than normal. EMS spoke to RN and Brother (Rafael CorwJose [phone]) who said patient seems at baseline. Pt is non-verbal. Eyes are open but pt does not seem to respond to any stimuli. Pt unable to participate in BE FAST screen due to baseline. PMH of seizure disorder, chronic UTI, HTN, HLD, and hypothyroidism.

## 2024-02-22 NOTE — ED PROVIDER NOTE - CLINICAL SUMMARY MEDICAL DECISION MAKING FREE TEXT BOX
pt presents with ? AMS. seems to be looking at ceiling and walls more than usual. nonverbal, nonambulatory at baseline. bradycardic but baseline on chart review. will obtain labs, cth, ua.     labs nonacute, cth nonacute, ua with uti. h/o chronic utis. will treat with cipro given pcn allergy.     initial K hemolyzed, repeated and nonacute. will d/c.     d/w pt's brother delmy (number in triage note) who agrees with plan. d/w pt's HHAemily) [phone] who is heading to the pt's house, has keys, and will stay there all night until the pt arrives. she is assigned to the pt for the whole weekend, 24 hour care. ambulance ordered for pt.

## 2024-02-22 NOTE — ED ADULT NURSE NOTE - NSFALLRISKINTERV_ED_ALL_ED
Assistance OOB with selected safe patient handling equipment if applicable/Assistance with ambulation/Communicate fall risk and risk factors to all staff, patient, and family/Monitor gait and stability/Monitor for mental status changes and reorient to person, place, and time, as needed/Provide patient with walking aids/Provide visual cue: yellow wristband, yellow gown, etc/Reinforce activity limits and safety measures with patient and family/Toileting schedule using arm’s reach rule for commode and bathroom/Use of alarms - bed, stretcher, chair and/or video monitoring/Call bell, personal items and telephone in reach/Instruct patient to call for assistance before getting out of bed/chair/stretcher/Non-slip footwear applied when patient is off stretcher/[place] to call system/Physically safe environment - no spills, clutter or unnecessary equipment/Purposeful Proactive Rounding/Room/bathroom lighting operational, light cord in reach

## 2024-02-22 NOTE — ED PROVIDER NOTE - OBJECTIVE STATEMENT
69F with developmental delay with PMHx of epilepsy (Lennox Gastaut sydnrome?) and chronic UTI as per old chart presents with HHA for AMS. HHA states she normally is able to make eye contact but was looking at the wall and ceiling more than usual so she called the ambulance. she also reports 1 episode of vomiting today but states this is common as pt has baseline dysphagia and has a liquids only diet.

## 2024-02-22 NOTE — ED ADULT NURSE REASSESSMENT NOTE - NS ED NURSE REASSESS COMMENT FT1
Pt's heart rate on CM is 49bpm with periodic dips to 44bpm, sinus bradycardia noted. ERIKA Darling informed and is aware.

## 2024-02-22 NOTE — ED ADULT NURSE NOTE - CHIEF COMPLAINT QUOTE
Pt BIBA from Nursing Home for possible AMS. HHA called because she felt the patient was less responsive than normal. EMS spoke to RN and Brother (Rafael CrowJose [phone]) who said patient seems at baseline. Pt is non-verbal. Eyes are open but pt does not seem to respond to any stimuli. Pt unable to participate in BE FAST screen due to baseline. PMH of seizure disorder, chronic UTI, HTN, HLD, and hypothyroidism.

## 2024-02-23 PROBLEM — G40.909 EPILEPSY, UNSPECIFIED, NOT INTRACTABLE, WITHOUT STATUS EPILEPTICUS: Chronic | Status: ACTIVE | Noted: 2022-01-28

## 2024-02-23 PROBLEM — N39.0 URINARY TRACT INFECTION, SITE NOT SPECIFIED: Chronic | Status: ACTIVE | Noted: 2022-01-28

## 2024-02-23 NOTE — ED POST DISCHARGE NOTE - REASON FOR FOLLOW-UP
Patient's pharmacy called to clarify the amount of total medication to administer.  Rx clarified that 200cc of medication would be enough for full 1 week course prescribed. Other

## 2024-02-24 LAB
CULTURE RESULTS: NO GROWTH — SIGNIFICANT CHANGE UP
SPECIMEN SOURCE: SIGNIFICANT CHANGE UP

## 2024-02-26 DIAGNOSIS — Z88.0 ALLERGY STATUS TO PENICILLIN: ICD-10-CM

## 2024-02-26 DIAGNOSIS — R41.82 ALTERED MENTAL STATUS, UNSPECIFIED: ICD-10-CM

## 2024-02-26 DIAGNOSIS — N39.0 URINARY TRACT INFECTION, SITE NOT SPECIFIED: ICD-10-CM

## 2024-02-26 DIAGNOSIS — G40.909 EPILEPSY, UNSPECIFIED, NOT INTRACTABLE, WITHOUT STATUS EPILEPTICUS: ICD-10-CM

## 2024-02-26 DIAGNOSIS — R00.1 BRADYCARDIA, UNSPECIFIED: ICD-10-CM

## 2024-03-02 NOTE — ED PROVIDER NOTE - CONDITION AT DISCHARGE:
HPI: Patient is a 33 y.o male with no significant pmhx presenting with left sided flank pain that radiates to his abdomen and groin. Patient first noticed this pain on Tuesday and it was initially a 3/10. He visited [facility] ED on Thursday where they diagnosed him with left ureteral stone seen on CT as a 7 mm proximal left ureteral stone with trace left-sided caliectasis and additional tiny non-obstructing stones seen on left. He was discharged with Flomax and pain medication. He returned after his pain increased to 10/10 with associated nausea and vomiting. Denies blood in the urine or pain with urination. States that urine is dark and with decreased output. No history of kidney stones.   ED course: Patient given Zofran, morphine, Toradol, and 1 L normal saline. Urology consulted. Patient made NPO and admitted to floor.   Hospital Course: Patient underwent uteroscopy with laser stone fragmentation.  The patient is hemodynamically stable and will be discharged home with a pain regimen consisting of Tylenol and or ibuprofen as well as Pyridium 100 mg 3 times daily for 5 days.  The patient will be provided with tamsulosin 0.4 mg daily as long as the stents are in place.  
stated
Improved

## 2025-02-18 NOTE — PHYSICAL THERAPY INITIAL EVALUATION ADULT - PATIENT/FAMILY AGREES WITH PLAN
yes

## 2025-06-25 ENCOUNTER — EMERGENCY (EMERGENCY)
Age: 70
LOS: 1 days | End: 2025-06-25
Attending: EMERGENCY MEDICINE | Admitting: EMERGENCY MEDICINE
Payer: MEDICARE

## 2025-06-25 VITALS
SYSTOLIC BLOOD PRESSURE: 124 MMHG | TEMPERATURE: 99 F | HEIGHT: 68 IN | HEART RATE: 74 BPM | OXYGEN SATURATION: 96 % | DIASTOLIC BLOOD PRESSURE: 75 MMHG | RESPIRATION RATE: 18 BRPM | WEIGHT: 220.46 LBS

## 2025-06-25 LAB
ALBUMIN SERPL ELPH-MCNC: 2.7 G/DL — LOW (ref 3.4–5)
ALP SERPL-CCNC: 135 U/L — HIGH (ref 40–120)
ALT FLD-CCNC: 15 U/L — SIGNIFICANT CHANGE UP (ref 12–42)
ANION GAP SERPL CALC-SCNC: 9 MMOL/L — SIGNIFICANT CHANGE UP (ref 9–16)
AST SERPL-CCNC: 29 U/L — SIGNIFICANT CHANGE UP (ref 15–37)
BASOPHILS # BLD AUTO: 0.01 K/UL — SIGNIFICANT CHANGE UP (ref 0–0.2)
BASOPHILS NFR BLD AUTO: 0.2 % — SIGNIFICANT CHANGE UP (ref 0–2)
BILIRUB SERPL-MCNC: 0.3 MG/DL — SIGNIFICANT CHANGE UP (ref 0.2–1.2)
BUN SERPL-MCNC: 21 MG/DL — SIGNIFICANT CHANGE UP (ref 7–23)
CALCIUM SERPL-MCNC: 8.7 MG/DL — SIGNIFICANT CHANGE UP (ref 8.5–10.5)
CHLORIDE SERPL-SCNC: 107 MMOL/L — SIGNIFICANT CHANGE UP (ref 96–108)
CO2 SERPL-SCNC: 27 MMOL/L — SIGNIFICANT CHANGE UP (ref 22–31)
CREAT SERPL-MCNC: 0.4 MG/DL — LOW (ref 0.5–1.3)
EGFR: 107 ML/MIN/1.73M2 — SIGNIFICANT CHANGE UP
EGFR: 107 ML/MIN/1.73M2 — SIGNIFICANT CHANGE UP
EOSINOPHIL # BLD AUTO: 0.04 K/UL — SIGNIFICANT CHANGE UP (ref 0–0.5)
EOSINOPHIL NFR BLD AUTO: 1 % — SIGNIFICANT CHANGE UP (ref 0–6)
GLUCOSE SERPL-MCNC: 123 MG/DL — HIGH (ref 70–99)
HCT VFR BLD CALC: 41.1 % — SIGNIFICANT CHANGE UP (ref 34.5–45)
HGB BLD-MCNC: 13.4 G/DL — SIGNIFICANT CHANGE UP (ref 11.5–15.5)
IMM GRANULOCYTES # BLD AUTO: 0.01 K/UL — SIGNIFICANT CHANGE UP (ref 0–0.07)
IMM GRANULOCYTES NFR BLD AUTO: 0.2 % — SIGNIFICANT CHANGE UP (ref 0–0.9)
LACTATE BLDV-MCNC: 1.3 MMOL/L — SIGNIFICANT CHANGE UP (ref 0.5–2)
LIDOCAIN IGE QN: 40 U/L — SIGNIFICANT CHANGE UP (ref 16–77)
LYMPHOCYTES # BLD AUTO: 1.34 K/UL — SIGNIFICANT CHANGE UP (ref 1–3.3)
LYMPHOCYTES NFR BLD AUTO: 32.2 % — SIGNIFICANT CHANGE UP (ref 13–44)
MCHC RBC-ENTMCNC: 32.6 G/DL — SIGNIFICANT CHANGE UP (ref 32–36)
MCHC RBC-ENTMCNC: 33.5 PG — SIGNIFICANT CHANGE UP (ref 27–34)
MCV RBC AUTO: 102.8 FL — HIGH (ref 80–100)
MONOCYTES # BLD AUTO: 0.31 K/UL — SIGNIFICANT CHANGE UP (ref 0–0.9)
MONOCYTES NFR BLD AUTO: 7.5 % — SIGNIFICANT CHANGE UP (ref 2–14)
NEUTROPHILS # BLD AUTO: 2.45 K/UL — SIGNIFICANT CHANGE UP (ref 1.8–7.4)
NEUTROPHILS NFR BLD AUTO: 58.9 % — SIGNIFICANT CHANGE UP (ref 43–77)
NRBC # BLD AUTO: 0 K/UL — SIGNIFICANT CHANGE UP (ref 0–0)
NRBC # FLD: 0 K/UL — SIGNIFICANT CHANGE UP (ref 0–0)
NRBC BLD AUTO-RTO: 0 /100 WBCS — SIGNIFICANT CHANGE UP (ref 0–0)
PCO2 BLDV: 45 MMHG — HIGH (ref 39–42)
PH BLDV: 7.44 — HIGH (ref 7.32–7.43)
PLATELET # BLD AUTO: 187 K/UL — SIGNIFICANT CHANGE UP (ref 150–400)
PMV BLD: 9.9 FL — SIGNIFICANT CHANGE UP (ref 7–13)
PO2 BLDV: 66 MMHG — HIGH (ref 25–45)
POTASSIUM SERPL-MCNC: 4.4 MMOL/L — SIGNIFICANT CHANGE UP (ref 3.5–5.3)
POTASSIUM SERPL-SCNC: 4.4 MMOL/L — SIGNIFICANT CHANGE UP (ref 3.5–5.3)
PROT SERPL-MCNC: 6.6 G/DL — SIGNIFICANT CHANGE UP (ref 6.4–8.2)
RBC # BLD: 4 M/UL — SIGNIFICANT CHANGE UP (ref 3.8–5.2)
RBC # FLD: 14.2 % — SIGNIFICANT CHANGE UP (ref 10.3–14.5)
SAO2 % BLDV: 93.9 % — HIGH (ref 67–88)
SODIUM SERPL-SCNC: 143 MMOL/L — SIGNIFICANT CHANGE UP (ref 132–145)
TROPONIN I, HIGH SENSITIVITY RESULT: 21.6 NG/L — SIGNIFICANT CHANGE UP
WBC # BLD: 4.16 K/UL — SIGNIFICANT CHANGE UP (ref 3.8–10.5)
WBC # FLD AUTO: 4.16 K/UL — SIGNIFICANT CHANGE UP (ref 3.8–10.5)

## 2025-06-25 PROCEDURE — 71045 X-RAY EXAM CHEST 1 VIEW: CPT | Mod: 26

## 2025-06-25 PROCEDURE — 99285 EMERGENCY DEPT VISIT HI MDM: CPT

## 2025-06-25 RX ORDER — ONDANSETRON HCL/PF 4 MG/2 ML
4 VIAL (ML) INJECTION ONCE
Refills: 0 | Status: COMPLETED | OUTPATIENT
Start: 2025-06-25 | End: 2025-06-25

## 2025-06-26 VITALS
HEART RATE: 55 BPM | SYSTOLIC BLOOD PRESSURE: 135 MMHG | RESPIRATION RATE: 17 BRPM | OXYGEN SATURATION: 95 % | DIASTOLIC BLOOD PRESSURE: 87 MMHG | TEMPERATURE: 98 F

## 2025-06-26 LAB
APPEARANCE UR: CLEAR — SIGNIFICANT CHANGE UP
BILIRUB UR-MCNC: NEGATIVE — SIGNIFICANT CHANGE UP
COLOR SPEC: YELLOW — SIGNIFICANT CHANGE UP
DIFF PNL FLD: NEGATIVE — SIGNIFICANT CHANGE UP
GLUCOSE UR QL: NEGATIVE MG/DL — SIGNIFICANT CHANGE UP
KETONES UR QL: NEGATIVE MG/DL — SIGNIFICANT CHANGE UP
LEUKOCYTE ESTERASE UR-ACNC: NEGATIVE — SIGNIFICANT CHANGE UP
NITRITE UR-MCNC: NEGATIVE — SIGNIFICANT CHANGE UP
PH UR: 6 — SIGNIFICANT CHANGE UP (ref 5–8)
PROT UR-MCNC: NEGATIVE MG/DL — SIGNIFICANT CHANGE UP
SP GR SPEC: 1.01 — SIGNIFICANT CHANGE UP (ref 1–1.03)
UROBILINOGEN FLD QL: 0.2 MG/DL — SIGNIFICANT CHANGE UP (ref 0.2–1)

## 2025-06-26 PROCEDURE — 70450 CT HEAD/BRAIN W/O DYE: CPT | Mod: 26

## 2025-06-26 PROCEDURE — 74177 CT ABD & PELVIS W/CONTRAST: CPT | Mod: 26

## 2025-06-27 DIAGNOSIS — R11.10 VOMITING, UNSPECIFIED: ICD-10-CM

## 2025-06-27 DIAGNOSIS — R11.2 NAUSEA WITH VOMITING, UNSPECIFIED: ICD-10-CM

## 2025-06-27 DIAGNOSIS — Z88.0 ALLERGY STATUS TO PENICILLIN: ICD-10-CM

## 2025-06-28 DIAGNOSIS — G40.909 EPILEPSY, UNSPECIFIED, NOT INTRACTABLE, WITHOUT STATUS EPILEPTICUS: ICD-10-CM

## 2025-07-06 VITALS
HEIGHT: 68 IN | SYSTOLIC BLOOD PRESSURE: 116 MMHG | OXYGEN SATURATION: 90 % | WEIGHT: 169.98 LBS | TEMPERATURE: 98 F | RESPIRATION RATE: 18 BRPM | DIASTOLIC BLOOD PRESSURE: 71 MMHG | HEART RATE: 90 BPM

## 2025-07-06 LAB
ALBUMIN SERPL ELPH-MCNC: 3.1 G/DL — LOW (ref 3.4–5)
ALP SERPL-CCNC: 148 U/L — HIGH (ref 40–120)
ALT FLD-CCNC: 18 U/L — SIGNIFICANT CHANGE UP (ref 12–42)
ANION GAP SERPL CALC-SCNC: 7 MMOL/L — LOW (ref 9–16)
APPEARANCE UR: CLEAR — SIGNIFICANT CHANGE UP
APTT BLD: 33.7 SEC — SIGNIFICANT CHANGE UP (ref 26.1–36.8)
AST SERPL-CCNC: 20 U/L — SIGNIFICANT CHANGE UP (ref 15–37)
BASOPHILS # BLD AUTO: 0.01 K/UL — SIGNIFICANT CHANGE UP (ref 0–0.2)
BASOPHILS NFR BLD AUTO: 0.1 % — SIGNIFICANT CHANGE UP (ref 0–2)
BILIRUB SERPL-MCNC: 0.4 MG/DL — SIGNIFICANT CHANGE UP (ref 0.2–1.2)
BILIRUB UR-MCNC: NEGATIVE — SIGNIFICANT CHANGE UP
BUN SERPL-MCNC: 16 MG/DL — SIGNIFICANT CHANGE UP (ref 7–23)
CALCIUM SERPL-MCNC: 9 MG/DL — SIGNIFICANT CHANGE UP (ref 8.5–10.5)
CHLORIDE SERPL-SCNC: 104 MMOL/L — SIGNIFICANT CHANGE UP (ref 96–108)
CK MB BLD-MCNC: <2.5 % — SIGNIFICANT CHANGE UP
CK MB CFR SERPL CALC: <0.5 NG/ML — LOW (ref 0.5–3.6)
CK SERPL-CCNC: 20 U/L — LOW (ref 26–192)
CO2 SERPL-SCNC: 30 MMOL/L — SIGNIFICANT CHANGE UP (ref 22–31)
COLOR SPEC: YELLOW — SIGNIFICANT CHANGE UP
CREAT SERPL-MCNC: 0.58 MG/DL — SIGNIFICANT CHANGE UP (ref 0.5–1.3)
DIFF PNL FLD: NEGATIVE — SIGNIFICANT CHANGE UP
EGFR: 98 ML/MIN/1.73M2 — SIGNIFICANT CHANGE UP
EGFR: 98 ML/MIN/1.73M2 — SIGNIFICANT CHANGE UP
EOSINOPHIL # BLD AUTO: 0.04 K/UL — SIGNIFICANT CHANGE UP (ref 0–0.5)
EOSINOPHIL NFR BLD AUTO: 0.5 % — SIGNIFICANT CHANGE UP (ref 0–6)
FLUAV AG NPH QL: SIGNIFICANT CHANGE UP
FLUBV AG NPH QL: SIGNIFICANT CHANGE UP
GLUCOSE SERPL-MCNC: 126 MG/DL — HIGH (ref 70–99)
GLUCOSE UR QL: NEGATIVE MG/DL — SIGNIFICANT CHANGE UP
HCT VFR BLD CALC: 44 % — SIGNIFICANT CHANGE UP (ref 34.5–45)
HGB BLD-MCNC: 14 G/DL — SIGNIFICANT CHANGE UP (ref 11.5–15.5)
IMM GRANULOCYTES # BLD AUTO: 0.02 K/UL — SIGNIFICANT CHANGE UP (ref 0–0.07)
IMM GRANULOCYTES NFR BLD AUTO: 0.3 % — SIGNIFICANT CHANGE UP (ref 0–0.9)
INR BLD: 1.04 — SIGNIFICANT CHANGE UP (ref 0.85–1.16)
KETONES UR QL: 15 MG/DL
LACTATE BLDV-MCNC: 1.9 MMOL/L — SIGNIFICANT CHANGE UP (ref 0.5–2)
LEUKOCYTE ESTERASE UR-ACNC: NEGATIVE — SIGNIFICANT CHANGE UP
LYMPHOCYTES # BLD AUTO: 1.57 K/UL — SIGNIFICANT CHANGE UP (ref 1–3.3)
LYMPHOCYTES NFR BLD AUTO: 20.7 % — SIGNIFICANT CHANGE UP (ref 13–44)
MCHC RBC-ENTMCNC: 31.8 G/DL — LOW (ref 32–36)
MCHC RBC-ENTMCNC: 33.1 PG — SIGNIFICANT CHANGE UP (ref 27–34)
MCV RBC AUTO: 104 FL — HIGH (ref 80–100)
MONOCYTES # BLD AUTO: 0.62 K/UL — SIGNIFICANT CHANGE UP (ref 0–0.9)
MONOCYTES NFR BLD AUTO: 8.2 % — SIGNIFICANT CHANGE UP (ref 2–14)
NEUTROPHILS # BLD AUTO: 5.32 K/UL — SIGNIFICANT CHANGE UP (ref 1.8–7.4)
NEUTROPHILS NFR BLD AUTO: 70.2 % — SIGNIFICANT CHANGE UP (ref 43–77)
NITRITE UR-MCNC: NEGATIVE — SIGNIFICANT CHANGE UP
NRBC # BLD AUTO: 0 K/UL — SIGNIFICANT CHANGE UP (ref 0–0)
NRBC # FLD: 0 K/UL — SIGNIFICANT CHANGE UP (ref 0–0)
NRBC BLD AUTO-RTO: 0 /100 WBCS — SIGNIFICANT CHANGE UP (ref 0–0)
NT-PROBNP SERPL-SCNC: 349 PG/ML — HIGH
OB PNL STL: NEGATIVE — SIGNIFICANT CHANGE UP
PH UR: 6 — SIGNIFICANT CHANGE UP (ref 5–8)
PLATELET # BLD AUTO: 182 K/UL — SIGNIFICANT CHANGE UP (ref 150–400)
PMV BLD: 9.3 FL — SIGNIFICANT CHANGE UP (ref 7–13)
POTASSIUM SERPL-MCNC: 4.1 MMOL/L — SIGNIFICANT CHANGE UP (ref 3.5–5.3)
POTASSIUM SERPL-SCNC: 4.1 MMOL/L — SIGNIFICANT CHANGE UP (ref 3.5–5.3)
PROT SERPL-MCNC: 7.5 G/DL — SIGNIFICANT CHANGE UP (ref 6.4–8.2)
PROT UR-MCNC: NEGATIVE MG/DL — SIGNIFICANT CHANGE UP
PROTHROM AB SERPL-ACNC: 12.1 SEC — SIGNIFICANT CHANGE UP (ref 9.9–13.4)
RBC # BLD: 4.23 M/UL — SIGNIFICANT CHANGE UP (ref 3.8–5.2)
RBC # FLD: 14.3 % — SIGNIFICANT CHANGE UP (ref 10.3–14.5)
RSV RNA NPH QL NAA+NON-PROBE: SIGNIFICANT CHANGE UP
SARS-COV-2 RNA SPEC QL NAA+PROBE: SIGNIFICANT CHANGE UP
SODIUM SERPL-SCNC: 141 MMOL/L — SIGNIFICANT CHANGE UP (ref 132–145)
SOURCE RESPIRATORY: SIGNIFICANT CHANGE UP
SP GR SPEC: 1.02 — SIGNIFICANT CHANGE UP (ref 1–1.03)
TROPONIN I, HIGH SENSITIVITY RESULT: 14.5 NG/L — SIGNIFICANT CHANGE UP
UROBILINOGEN FLD QL: 0.2 MG/DL — SIGNIFICANT CHANGE UP (ref 0.2–1)
WBC # BLD: 7.58 K/UL — SIGNIFICANT CHANGE UP (ref 3.8–10.5)
WBC # FLD AUTO: 7.58 K/UL — SIGNIFICANT CHANGE UP (ref 3.8–10.5)

## 2025-07-06 PROCEDURE — 71275 CT ANGIOGRAPHY CHEST: CPT | Mod: 26

## 2025-07-06 PROCEDURE — 71045 X-RAY EXAM CHEST 1 VIEW: CPT | Mod: 26

## 2025-07-06 PROCEDURE — 74177 CT ABD & PELVIS W/CONTRAST: CPT | Mod: 26

## 2025-07-06 PROCEDURE — 70450 CT HEAD/BRAIN W/O DYE: CPT | Mod: 26

## 2025-07-06 RX ORDER — ACETAMINOPHEN 500 MG/5ML
1000 LIQUID (ML) ORAL ONCE
Refills: 0 | Status: COMPLETED | OUTPATIENT
Start: 2025-07-06 | End: 2025-07-06

## 2025-07-06 RX ADMIN — Medication 2400 MILLILITER(S): at 17:29

## 2025-07-06 RX ADMIN — Medication 400 MILLIGRAM(S): at 17:31

## 2025-07-06 NOTE — ED ADULT TRIAGE NOTE - CHIEF COMPLAINT QUOTE
Pt BIBA from home accompanied by HHA. As per HHA since yesterday Pt with AMS, anorexia and fever. Responsive to voice. Not following commands in triage.

## 2025-07-06 NOTE — ED ADULT NURSE REASSESSMENT NOTE - NS ED NURSE REASSESS COMMENT FT1
Pt's right USIV infiltrated, pt with significant swelling to right upper arm. Provider aware. IV removed, ace wrap applied and heat packs placed over ace wrap. ERIKA Enciso attempted USIV to left arm x2 unsuccessfully. Peripheral IV placed to right lower leg by this RN after verbal order for permission from ERIKA Jarrell.

## 2025-07-06 NOTE — ED ADULT NURSE NOTE - OBJECTIVE STATEMENT
Pt arrives with HHA. Per HHA pt is normally much more alert and interactive. Pt has had episodes of vomiting since yesterday and today was feverish and lethargic. Pt was hypoxic prehospital, pt placed on pulse ox and cardiac tele. Pt currently on 2L NC at 98%. Pt febrile rectally. USIV placed by ERIKA Enciso.

## 2025-07-06 NOTE — ED PROVIDER NOTE - CLINICAL SUMMARY MEDICAL DECISION MAKING FREE TEXT BOX
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  Per home health aide, patient was also febrile.  Normally more alert however patient very somnolent, she only responsive to tactile stimuli and falls back asleep.    Patient febrile in the ED.  Concern for possible underlying infectious etiology.  Sepsis activated, patient given Levaquin given documented penicillin allergy for broad-spectrum coverage.  Will obtain chest x-ray given the hypoxia rule out pneumonia, CT head to rule underlying intracranial pathology.  Obtain labs including VBG, check cardiac markers.  Check urinalysis as well.  Will give IV fluids, likely admit

## 2025-07-06 NOTE — ED PROVIDER NOTE - PROGRESS NOTE DETAILS
received sign out from ERIKA Jarrell and Dr. Eric pending labs and imaging. Pt reassessed at bedside with similar mental status, arousable to tactile stimuli but quickly falls asleep and non verbal. Fever defervesced, added additional RVP and TSH, r/o viral sx vs myxedema coma. CT with no apparent bacterial source noted. Case discussed with Dr. Merchant, hospitalist, accepted to [facility] for further w/u of AMS w FOU pt's brother - Matias Crow notified by Dr. Bob and pt's conditions updated with family. brother is POA and amendable to current transfer. [facility] - full code per brother pt's brother - Matias Crow notified by Dr. Bob and pt's conditions updated with family. brother is POA and amendable to current transfer. [facility] - full code per brother His number [phone]

## 2025-07-06 NOTE — ED ADULT NURSE NOTE - NSFALLRISKINTERV_ED_ALL_ED
Assistance OOB with selected safe patient handling equipment if applicable/Assistance with ambulation/Communicate fall risk and risk factors to all staff, patient, and family/Monitor gait and stability/Monitor for mental status changes and reorient to person, place, and time, as needed/Provide patient with walking aids/Provide visual cue: yellow wristband, yellow gown, etc/Reinforce activity limits and safety measures with patient and family/Toileting schedule using arm’s reach rule for commode and bathroom/Use of alarms - bed, stretcher, chair and/or video monitoring/Call bell, personal items and telephone in reach/Instruct patient to call for assistance before getting out of bed/chair/stretcher/Non-slip footwear applied when patient is off stretcher/[place] to call system/Physically safe environment - no spills, clutter or unnecessary equipment/Purposeful Proactive Rounding/Room/bathroom lighting operational, light cord in reach

## 2025-07-06 NOTE — ED PROVIDER NOTE - PHYSICAL EXAMINATION
Vital Signs: I have reviewed the initial vital signs.  Constitutional: well-appearing, appears stated age, nontoxic appearing, NAD  Eyes: PERRLA  ENT: Neck supple with no adenopathy, moist MM.  Cardiovascular: regular rate, regular rhythm, well-perfused extremities, pulses 2+ b/l ue and le, no LE edema. +S1S2  Respiratory: unlabored respiratory effort, clear to auscultation bilaterally  Gastrointestinal: soft, NTND. no guarding or rebound, no cvat b/l  Musculoskeletal: FROM of b/l UE and LE without any focal TTP  Integumentary: warm, dry, no rash  Neurologic:Full neurological examination limited secondary current mental status.  Patient currently somnolent, responsive to tactile stimuli falls back asleep.  Moves all extremities spontaneously

## 2025-07-06 NOTE — ED PROVIDER NOTE - OBJECTIVE STATEMENT
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  Per home health aide, patient was also febrile.  Normally more alert however patient very somnolent, she only responsive to tactile stimuli and falls back asleep.  ROS limited secondary to patient mental status

## 2025-07-07 ENCOUNTER — INPATIENT (INPATIENT)
Facility: HOSPITAL | Age: 70
LOS: 2 days | Discharge: HOME | End: 2025-07-10
Attending: STUDENT IN AN ORGANIZED HEALTH CARE EDUCATION/TRAINING PROGRAM | Admitting: STUDENT IN AN ORGANIZED HEALTH CARE EDUCATION/TRAINING PROGRAM
Payer: MEDICARE

## 2025-07-07 DIAGNOSIS — E78.5 HYPERLIPIDEMIA, UNSPECIFIED: ICD-10-CM

## 2025-07-07 DIAGNOSIS — Z29.9 ENCOUNTER FOR PROPHYLACTIC MEASURES, UNSPECIFIED: ICD-10-CM

## 2025-07-07 DIAGNOSIS — Z86.69 PERSONAL HISTORY OF OTHER DISEASES OF THE NERVOUS SYSTEM AND SENSE ORGANS: ICD-10-CM

## 2025-07-07 DIAGNOSIS — E03.9 HYPOTHYROIDISM, UNSPECIFIED: ICD-10-CM

## 2025-07-07 DIAGNOSIS — R50.9 FEVER, UNSPECIFIED: ICD-10-CM

## 2025-07-07 DIAGNOSIS — G93.41 METABOLIC ENCEPHALOPATHY: ICD-10-CM

## 2025-07-07 LAB
-  STAPHYLOCOCCUS EPIDERMIDIS: SIGNIFICANT CHANGE UP
A1C WITH ESTIMATED AVERAGE GLUCOSE RESULT: 4.7 % — SIGNIFICANT CHANGE UP (ref 4–5.6)
ALBUMIN SERPL ELPH-MCNC: 3.1 G/DL — LOW (ref 3.3–5)
ALP SERPL-CCNC: 120 U/L — SIGNIFICANT CHANGE UP (ref 40–120)
ALT FLD-CCNC: 7 U/L — LOW (ref 10–45)
ANION GAP SERPL CALC-SCNC: 11 MMOL/L — SIGNIFICANT CHANGE UP (ref 5–17)
AST SERPL-CCNC: 15 U/L — SIGNIFICANT CHANGE UP (ref 10–40)
BASOPHILS # BLD AUTO: 0.02 K/UL — SIGNIFICANT CHANGE UP (ref 0–0.2)
BASOPHILS NFR BLD AUTO: 0.4 % — SIGNIFICANT CHANGE UP (ref 0–2)
BILIRUB SERPL-MCNC: 0.2 MG/DL — SIGNIFICANT CHANGE UP (ref 0.2–1.2)
BUN SERPL-MCNC: 10 MG/DL — SIGNIFICANT CHANGE UP (ref 7–23)
CALCIUM SERPL-MCNC: 9 MG/DL — SIGNIFICANT CHANGE UP (ref 8.4–10.5)
CHLORIDE SERPL-SCNC: 107 MMOL/L — SIGNIFICANT CHANGE UP (ref 96–108)
CHOLEST SERPL-MCNC: 144 MG/DL — SIGNIFICANT CHANGE UP
CO2 SERPL-SCNC: 21 MMOL/L — LOW (ref 22–31)
CREAT SERPL-MCNC: 0.35 MG/DL — LOW (ref 0.5–1.3)
EGFR: 111 ML/MIN/1.73M2 — SIGNIFICANT CHANGE UP
EGFR: 111 ML/MIN/1.73M2 — SIGNIFICANT CHANGE UP
EOSINOPHIL # BLD AUTO: 0.12 K/UL — SIGNIFICANT CHANGE UP (ref 0–0.5)
EOSINOPHIL NFR BLD AUTO: 2.4 % — SIGNIFICANT CHANGE UP (ref 0–6)
ESTIMATED AVERAGE GLUCOSE: 88 MG/DL — SIGNIFICANT CHANGE UP (ref 68–114)
GLUCOSE SERPL-MCNC: 97 MG/DL — SIGNIFICANT CHANGE UP (ref 70–99)
GRAM STN FLD: ABNORMAL
HCT VFR BLD CALC: 40.5 % — SIGNIFICANT CHANGE UP (ref 34.5–45)
HDLC SERPL-MCNC: 61 MG/DL — SIGNIFICANT CHANGE UP
HGB BLD-MCNC: 12.8 G/DL — SIGNIFICANT CHANGE UP (ref 11.5–15.5)
IMM GRANULOCYTES # BLD AUTO: 0.01 K/UL — SIGNIFICANT CHANGE UP (ref 0–0.07)
IMM GRANULOCYTES NFR BLD AUTO: 0.2 % — SIGNIFICANT CHANGE UP (ref 0–0.9)
LDLC SERPL-MCNC: 65 MG/DL — SIGNIFICANT CHANGE UP
LIPID PNL WITH DIRECT LDL SERPL: 65 MG/DL — SIGNIFICANT CHANGE UP
LYMPHOCYTES # BLD AUTO: 1.15 K/UL — SIGNIFICANT CHANGE UP (ref 1–3.3)
LYMPHOCYTES NFR BLD AUTO: 22.9 % — SIGNIFICANT CHANGE UP (ref 13–44)
MAGNESIUM SERPL-MCNC: 1.7 MG/DL — SIGNIFICANT CHANGE UP (ref 1.6–2.6)
MCHC RBC-ENTMCNC: 31.6 G/DL — LOW (ref 32–36)
MCHC RBC-ENTMCNC: 34.1 PG — HIGH (ref 27–34)
MCV RBC AUTO: 108 FL — HIGH (ref 80–100)
METHOD TYPE: SIGNIFICANT CHANGE UP
MONOCYTES # BLD AUTO: 0.49 K/UL — SIGNIFICANT CHANGE UP (ref 0–0.9)
MONOCYTES NFR BLD AUTO: 9.8 % — SIGNIFICANT CHANGE UP (ref 2–14)
NEUTROPHILS # BLD AUTO: 3.23 K/UL — SIGNIFICANT CHANGE UP (ref 1.8–7.4)
NEUTROPHILS NFR BLD AUTO: 64.3 % — SIGNIFICANT CHANGE UP (ref 43–77)
NONHDLC SERPL-MCNC: 83 MG/DL — SIGNIFICANT CHANGE UP
NRBC # BLD AUTO: 0 K/UL — SIGNIFICANT CHANGE UP (ref 0–0)
NRBC # FLD: 0 K/UL — SIGNIFICANT CHANGE UP (ref 0–0)
NRBC BLD AUTO-RTO: 0 /100 WBCS — SIGNIFICANT CHANGE UP (ref 0–0)
PCP SPEC-MCNC: SIGNIFICANT CHANGE UP
PLATELET # BLD AUTO: 162 K/UL — SIGNIFICANT CHANGE UP (ref 150–400)
PMV BLD: 9.4 FL — SIGNIFICANT CHANGE UP (ref 7–13)
POTASSIUM SERPL-MCNC: 4 MMOL/L — SIGNIFICANT CHANGE UP (ref 3.5–5.3)
POTASSIUM SERPL-SCNC: 4 MMOL/L — SIGNIFICANT CHANGE UP (ref 3.5–5.3)
PROT SERPL-MCNC: 6.2 G/DL — SIGNIFICANT CHANGE UP (ref 6–8.3)
RBC # BLD: 3.75 M/UL — LOW (ref 3.8–5.2)
RBC # FLD: 14 % — SIGNIFICANT CHANGE UP (ref 10.3–14.5)
SODIUM SERPL-SCNC: 139 MMOL/L — SIGNIFICANT CHANGE UP (ref 135–145)
SPECIMEN SOURCE: SIGNIFICANT CHANGE UP
SPECIMEN SOURCE: SIGNIFICANT CHANGE UP
TRIGL SERPL-MCNC: 99 MG/DL — SIGNIFICANT CHANGE UP
TSH SERPL-MCNC: 0.86 UIU/ML — SIGNIFICANT CHANGE UP (ref 0.36–3.74)
WBC # BLD: 5.02 K/UL — SIGNIFICANT CHANGE UP (ref 3.8–10.5)
WBC # FLD AUTO: 5.02 K/UL — SIGNIFICANT CHANGE UP (ref 3.8–10.5)

## 2025-07-07 PROCEDURE — 82962 GLUCOSE BLOOD TEST: CPT

## 2025-07-07 PROCEDURE — 87150 DNA/RNA AMPLIFIED PROBE: CPT

## 2025-07-07 PROCEDURE — 82550 ASSAY OF CK (CPK): CPT

## 2025-07-07 PROCEDURE — 83735 ASSAY OF MAGNESIUM: CPT

## 2025-07-07 PROCEDURE — 99285 EMERGENCY DEPT VISIT HI MDM: CPT | Mod: FS

## 2025-07-07 PROCEDURE — 84443 ASSAY THYROID STIM HORMONE: CPT

## 2025-07-07 PROCEDURE — 82272 OCCULT BLD FECES 1-3 TESTS: CPT

## 2025-07-07 PROCEDURE — 0225U NFCT DS DNA&RNA 21 SARSCOV2: CPT

## 2025-07-07 PROCEDURE — 85730 THROMBOPLASTIN TIME PARTIAL: CPT

## 2025-07-07 PROCEDURE — 80061 LIPID PANEL: CPT

## 2025-07-07 PROCEDURE — 83605 ASSAY OF LACTIC ACID: CPT

## 2025-07-07 PROCEDURE — 36415 COLL VENOUS BLD VENIPUNCTURE: CPT

## 2025-07-07 PROCEDURE — 82746 ASSAY OF FOLIC ACID SERUM: CPT

## 2025-07-07 PROCEDURE — 82607 VITAMIN B-12: CPT

## 2025-07-07 PROCEDURE — 80053 COMPREHEN METABOLIC PANEL: CPT

## 2025-07-07 PROCEDURE — 86780 TREPONEMA PALLIDUM: CPT

## 2025-07-07 PROCEDURE — 99223 1ST HOSP IP/OBS HIGH 75: CPT | Mod: GC

## 2025-07-07 PROCEDURE — 85610 PROTHROMBIN TIME: CPT

## 2025-07-07 PROCEDURE — 82553 CREATINE MB FRACTION: CPT

## 2025-07-07 PROCEDURE — 85025 COMPLETE CBC W/AUTO DIFF WBC: CPT

## 2025-07-07 PROCEDURE — 80307 DRUG TEST PRSMV CHEM ANLYZR: CPT

## 2025-07-07 PROCEDURE — 95718 EEG PHYS/QHP 2-12 HR W/VEEG: CPT

## 2025-07-07 PROCEDURE — 0241U: CPT

## 2025-07-07 PROCEDURE — 87040 BLOOD CULTURE FOR BACTERIA: CPT

## 2025-07-07 PROCEDURE — 83880 ASSAY OF NATRIURETIC PEPTIDE: CPT

## 2025-07-07 PROCEDURE — 84484 ASSAY OF TROPONIN QUANT: CPT

## 2025-07-07 PROCEDURE — 83036 HEMOGLOBIN GLYCOSYLATED A1C: CPT

## 2025-07-07 RX ORDER — METHENAMINE MANDELATE 1 G
1 TABLET ORAL
Refills: 0 | DISCHARGE

## 2025-07-07 RX ORDER — ENOXAPARIN SODIUM 100 MG/ML
40 INJECTION SUBCUTANEOUS EVERY 24 HOURS
Refills: 0 | Status: DISCONTINUED | OUTPATIENT
Start: 2025-07-07 | End: 2025-07-09

## 2025-07-07 RX ORDER — POLYETHYLENE GLYCOL 3350 17 G/17G
17 POWDER, FOR SOLUTION ORAL EVERY 12 HOURS
Refills: 0 | Status: DISCONTINUED | OUTPATIENT
Start: 2025-07-07 | End: 2025-07-10

## 2025-07-07 RX ORDER — ROSUVASTATIN CALCIUM 20 MG/1
5 TABLET, FILM COATED ORAL AT BEDTIME
Refills: 0 | Status: DISCONTINUED | OUTPATIENT
Start: 2025-07-07 | End: 2025-07-10

## 2025-07-07 RX ORDER — CLOBAZAM 20 MG/1
10 TABLET ORAL EVERY 24 HOURS
Refills: 0 | Status: DISCONTINUED | OUTPATIENT
Start: 2025-07-07 | End: 2025-07-07

## 2025-07-07 RX ORDER — BISACODYL 5 MG
5 TABLET, DELAYED RELEASE (ENTERIC COATED) ORAL AT BEDTIME
Refills: 0 | Status: DISCONTINUED | OUTPATIENT
Start: 2025-07-07 | End: 2025-07-10

## 2025-07-07 RX ORDER — VANCOMYCIN HCL IN 5 % DEXTROSE 1.5G/250ML
1250 PLASTIC BAG, INJECTION (ML) INTRAVENOUS EVERY 12 HOURS
Refills: 0 | Status: COMPLETED | OUTPATIENT
Start: 2025-07-07 | End: 2025-07-08

## 2025-07-07 RX ORDER — LANOLIN/MINERAL OIL/PETROLATUM
1 OINTMENT (GRAM) OPHTHALMIC (EYE) DAILY
Refills: 0 | Status: DISCONTINUED | OUTPATIENT
Start: 2025-07-07 | End: 2025-07-10

## 2025-07-07 RX ORDER — CLOBAZAM 20 MG/1
10 TABLET ORAL EVERY 24 HOURS
Refills: 0 | Status: DISCONTINUED | OUTPATIENT
Start: 2025-07-07 | End: 2025-07-10

## 2025-07-07 RX ORDER — SENNA 187 MG
2 TABLET ORAL AT BEDTIME
Refills: 0 | Status: DISCONTINUED | OUTPATIENT
Start: 2025-07-07 | End: 2025-07-10

## 2025-07-07 RX ORDER — VANCOMYCIN HCL IN 5 % DEXTROSE 1.5G/250ML
1250 PLASTIC BAG, INJECTION (ML) INTRAVENOUS ONCE
Refills: 0 | Status: DISCONTINUED | OUTPATIENT
Start: 2025-07-07 | End: 2025-07-07

## 2025-07-07 RX ORDER — LEVOTHYROXINE SODIUM 300 MCG
88 TABLET ORAL EVERY 24 HOURS
Refills: 0 | Status: DISCONTINUED | OUTPATIENT
Start: 2025-07-07 | End: 2025-07-10

## 2025-07-07 RX ORDER — CENOBAMATE 150-200 MG
200 KIT ORAL EVERY 24 HOURS
Refills: 0 | Status: DISCONTINUED | OUTPATIENT
Start: 2025-07-07 | End: 2025-07-10

## 2025-07-07 RX ORDER — FOLIC ACID 1 MG/1
1 TABLET ORAL DAILY
Refills: 0 | Status: DISCONTINUED | OUTPATIENT
Start: 2025-07-07 | End: 2025-07-10

## 2025-07-07 RX ADMIN — Medication 1000 MILLIGRAM(S): at 03:05

## 2025-07-07 RX ADMIN — Medication 5 MILLIGRAM(S): at 21:14

## 2025-07-07 RX ADMIN — ENOXAPARIN SODIUM 40 MILLIGRAM(S): 100 INJECTION SUBCUTANEOUS at 07:01

## 2025-07-07 RX ADMIN — Medication 166.67 MILLIGRAM(S): at 15:29

## 2025-07-07 RX ADMIN — ROSUVASTATIN CALCIUM 5 MILLIGRAM(S): 20 TABLET, FILM COATED ORAL at 21:14

## 2025-07-07 RX ADMIN — Medication 2400 MILLILITER(S): at 03:05

## 2025-07-07 RX ADMIN — Medication 100 MILLIGRAM(S): at 07:02

## 2025-07-07 RX ADMIN — Medication 2 TABLET(S): at 21:14

## 2025-07-07 RX ADMIN — POLYETHYLENE GLYCOL 3350 17 GRAM(S): 17 POWDER, FOR SOLUTION ORAL at 07:01

## 2025-07-07 RX ADMIN — FOLIC ACID 1 MILLIGRAM(S): 1 TABLET ORAL at 07:02

## 2025-07-07 RX ADMIN — Medication 1 DROP(S): at 12:53

## 2025-07-07 RX ADMIN — CENOBAMATE 200 MILLIGRAM(S): KIT ORAL at 07:01

## 2025-07-07 RX ADMIN — Medication 88 MICROGRAM(S): at 07:02

## 2025-07-07 RX ADMIN — CLOBAZAM 10 MILLIGRAM(S): 20 TABLET ORAL at 21:30

## 2025-07-07 NOTE — PROGRESS NOTE ADULT - ASSESSMENT
A 70-year-old female with a history of epilepsy, VNS placement, hypothyroidism, and Takotsubo cardiomyopathy presented to the ED with altered mental status and fever per HHA. Admitted for further workup A 70-year-old female with a history of epilepsy, VNS placement, hypothyroidism, and Takotsubo cardiomyopathy presented to the ED with altered mental status and fever per HHA.      A 70-year-old female with a history of epilepsy, VNS placement, hypothyroidism, and Takotsubo cardiomyopathy was brought to the ED by HHA with altered mental status and fever. Pt is currently afebrile and admitted to [facility] for further evaluation and managment.

## 2025-07-07 NOTE — H&P ADULT - ASSESSMENT
A 70-year-old female with a history of epilepsy, VNS placement, hypothyroidism, and Takotsubo cardiomyopathy presented to the ED with altered mental status and fever per HHA. Admitted for further workup

## 2025-07-07 NOTE — H&P ADULT - HISTORY OF PRESENT ILLNESS
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 Per home health aide in ED patient was also febrile.  Normally more alert however patient very somnolent, she only responsive to tactile stimuli and falls back asleep. Upon my evaluation no HHA or family present. Pt awake and responding to questions/following commands but A&Ox1. She acknowledges questions but unable to fully comprehend and provide actual answer.     In the ED  Vitals: T 101.8 rectal, HR 75, /66, RR 16 O2 98% on 2L NC  Labs: WBC 7.58, Hb 14, Plts 182, Cr 0.58, UA negative, RVP negative, Lactate 1.9, TSH 0.856  Imaging: CTH No evidence of acute transcortical infarct, acute intracranial   hemorrhage, or mass effect.  CTA chest and CT A/P:  Limited evaluation of the segmental and subsegmental pulmonary arteries in the lower right lung due to motion artifact. Within this limitation, no pulmonary embolism is visualized. Extensive soft tissue swelling throughout the right shoulder region, visualized right upper extremity, and anterior upper right chest wall. This has increased since the CT of the abdomen and pelvis from 6/26/2025 when there was contrast extravasation. Follow-up as clinically warranted. No acute intra-abdominal findings.  Interventions: Ofirmev x1, Levaquin 750mg x1 (PCN allergy), NS 2400cc

## 2025-07-07 NOTE — PATIENT PROFILE ADULT - FALL HARM RISK - HARM RISK INTERVENTIONS

## 2025-07-07 NOTE — PATIENT PROFILE ADULT - SURGICAL SITE INCISION
Problem: Risk for Impaired Skin Integrity  Goal: Tissue integrity - skin and mucous membranes  Description  Structural intactness and normal physiological function of skin and  mucous membranes.   8/30/2019 1314 by Migel Junior RN  Outcome: Ongoing  8/30/2019 0121 by Bhavin Carey RN  Outcome: Ongoing     Problem: Falls - Risk of:  Goal: Will remain free from falls  Description  Will remain free from falls  8/30/2019 1314 by Migel Junior RN  Outcome: Ongoing  8/30/2019 0121 by Bhavin Carey RN  Outcome: Ongoing  Goal: Absence of physical injury  Description  Absence of physical injury  8/30/2019 1314 by Migel Junior RN  Outcome: Ongoing  8/30/2019 0121 by Bhavin Carey RN  Outcome: Ongoing     Problem: Restraint Use - Nonviolent/Non-Self-Destructive Behavior:  Goal: Absence of restraint indications  Description  Absence of restraint indications  8/30/2019 1314 by Migel Junior RN  Outcome: Ongoing  8/30/2019 0121 by Bhavin Carey RN  Outcome: Ongoing  Goal: Absence of restraint-related injury  Description  Absence of restraint-related injury  8/30/2019 1314 by Migel Junior RN  Outcome: Ongoing  8/30/2019 0121 by Bhavin Carey RN  Outcome: Ongoing
no

## 2025-07-07 NOTE — CHART NOTE - NSCHARTNOTEFT_GEN_A_CORE
-------------------------------Penicillin Allergy PENFAST Chart Note------------------------    Provider Contacted: Dr Keith Noonan    Patient verbal consent to PENFAST evaluation:      [  ] YES       [  ] NO         PEN-FAST EVALUATION:    - PEN: is PENicillin allergy reported by patient?    If yes, proceed with assessment:    - F: Five years or less since reaction (includes unknown) = 2 points   - A: Anaphylaxis or angioedema = 2 points   - S: Severe cutaneous adverse reaction (*SCAR) such as SJS, TEN, DRESS, etc. = 2 points   - T: Treatment required for reaction (includes unknown)  1 point     Total PENFAST score:    ___/5     Interpretation:  0 points, Very low risk of positive penicillin allergy test <1% - eligible for amoxicillin oral challenge     1-2 points, Low risk of positive penicillin allergy test 5% - eligible for amoxicillin oral challenge     3 points, Moderate risk of positive penicillin allergy test 20%  - See “Penicillin Administration in Patient with Penicillin Allergy History” Algorithm     4-5 points, High risk of positive penicillin allergy test 50% - See “Penicillin Administration in Patient with Penicillin Allergy History” Algorithm     *Severe cutaneous adverse reaction (SCAR) equals 2 points suggesting low risk, but these patients should NOT be challenged with any beta-lactam agent. Consequently, We ADVISE AGAINST using PEN-FAST in the setting of penicillin allergy history consistent with SCAR per [facility] Guidelines.         AMOXICILLIN CHALLENGE:    - Patient verbal consent to AMOXICLLIN CHALLENGE:      [  ] YES       [  ] NO   - Order Amoxicillin 250mg PO x 1 dose, indication: “Amoxicillin Challenge”       TREATMENT OF HYPERSENSITIVITY REACTIONS:    Order all medications at bedside (Epinephrine, Methylprednisolone, Albuterol, Famotidine)   --> Order Set Name: “Allergic Reaction” or “Hypersensitivity”   --> All medications are available in every [facility]    *Mild/Moderate Reactions:  - Examples: Normal vital signs with isolated itching, flushing, hives, mild chest tightness, nausea, abdominal pain, or back pain.?   - Treat with any/all of the following:                   - Diphenhydramine 50 mG IV Push over 1-2 minutes x 1 dose                   - Methylprednisolone 125 mG slow IV Push over 3 minutes x 1 dose                   - Albuterol Nebulization Solution 2.5 mG nebulization PRN                   - Famotidine 20 mG IV/PO x 1 dose     *Severe Reactions  - Examples: Hypotension, throat swelling, wheezing, respiratory distress, decreased oxygen saturation.?   - Treat with the following:                    - Epinephrine 0.3 mG IM x 1 dose, may be repeated PRN                    - Use medications listed under Mild/Moderate Reactions as needed                     - Place patient on monitor and call RRT or ICU consult         ASSESSMENT:     #History of adverse reaction to penicillin, subsequent encounter     Select either NEGATIVE/PASSED or POSITIVE/FAILED challenge:    NEGATIVE or PASSED Challenge:  - The patient tolerated amoxicillin challenge today without adverse reaction.    - F[place] was adjusted by adding an “intolerance” with the following details: “Oral Amox challenge passed on mm/dd/yy”.   PLAN:  - Educate the patient on delayed hypersensitivity reactions (typically ~6 hours later) which include benign drug eruptions or more severe cases such as DRESS or SJS/TEN. If a delayed reaction occurs, the patient has failed the challenge and should seek medical attention immediately.   - Give patient information letter   - Document in discharge summary under patient’s Care Plan (.[email or web address])   - Document in discharge summary for PCP to see (.[email or web address])     OR     POSITIVE or FAILED Challenge:  The patient was unable to tolerate the amoxicillin challenge today. Patient developed _______ (rash, itching, hives, SOB, angioedema, respiratory distress) about X mins after taking oral amoxicillin. The patient was treated with *** and observed. Patient was instructed to continue strict avoidance of penicillin and should seek allergy evaluation.

## 2025-07-07 NOTE — H&P ADULT - PROBLEM SELECTOR PLAN 1
Plan: Patient to start Isotretinoin pending lab work. He will need to be registered in [employer] (patient has forms at home with him). Father is going to check with insurance on coverage. If prescription is too much he will call for refill on minocycline and tazorac Discontinue Regimen: Minocycline & Tazorac gel if patient begins isotretinoin Detail Level: Zone Pt presenting with 1 day of increased somnolence and found to be febrile by HHA  In ED met 1/4 SIRS criteria (fever)  WBC of 7.5, UA negative, RVP negative, CTH negative  CTA with no PE. Extensive soft tissue swelling throughout the right shoulder region, visualized right upper extremity, and anterior upper right chest wall. This has increased since the CT of the abdomen pelvis from 6/26/2025. Several gas foci along the medial aspect of the upper arm. Gas foci within the right medial arm were also visualized on prior CT. No organized fluid collection is imaged.  On exam pt follows commands, but further ROS limited 2/2 baseline mental status. RUE without signs of infections, appears to be IV insertion attempt to R arm. No crepitus, erythema, induration noted. NVI in distal hand    -Continue off antibiotics at this point, pt remains HDS without clear source of active infection at this point  -RUE US to evaluate for gas noted on CT  -VEEG monitoring  -R/O reversible causes, F/U B12/folate, RPR. Check clobazam and cenobamate level  -Epilepsy consult Pt presenting with 1 day of increased somnolence and found to be febrile by HHA. Baseline A&Ox1-2 from previous charts. HHA not present upon arrival to [facility]  CTH negative  On exam pt follows commands, but further ROS limited 2/2 baseline mental status.    -VEEG monitoring  -R/O reversible causes, F/U B12/folate, RPR. Check clobazam and cenobamate level  -No records per HIE, however Dr. Belcher recently prescribed AEDs, further collateral in AM including med rec  -Epilepsy consult

## 2025-07-07 NOTE — DISCHARGE NOTE PROVIDER - HOSPITAL COURSE
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 Per home health aide in ED patient was also febrile.  Normally more alert however patient very somnolent, she only responsive to tactile stimuli and falls back asleep. Upon my evaluation no HHA or family present. Pt awake and responding to questions/following commands but A&Ox1. She acknowledges questions but unable to fully comprehend and provide actual answer.     In the ED  Vitals: T 101.8 rectal, HR 75, /66, RR 16 O2 98% on 2L NC  Labs: WBC 7.58, Hb 14, Plts 182, Cr 0.58, UA negative, RVP negative, Lactate 1.9, TSH 0.856  Imaging: CTH No evidence of acute transcortical infarct, acute intracranial   hemorrhage, or mass effect.  CTA chest and CT A/P:  Limited evaluation of the segmental and subsegmental pulmonary arteries in the lower right lung due to motion artifact. Within this limitation, no pulmonary embolism is visualized. Extensive soft tissue swelling throughout the right shoulder region, visualized right upper extremity, and anterior upper right chest wall. This has increased since the CT of the abdomen and pelvis from 6/26/2025 when there was contrast extravasation. Follow-up as clinically warranted. No acute intra-abdominal findings.  Interventions: Ofirmev x1, Levaquin 750mg x1 (PCN allergy), NS 2400cc   (07 Jul 2025 04:12)      Plan:  -PCP followup established for patient, and patient made aware, discussed return instructions with teachback.    Patient was discharged to:  Home  (home/[facility]/acute rehab/hospice, etc, and with what services – home health PT/RN? Home O2?)    New medications: N/A     Changes to old medications: N/A      Medications that were stopped: N/A  Items to follow up as outpatient:  PCP followup     Physical exam at the time of discharge:   Awake, alert and oriented to person place & time, with no focal deficits; PERRL, no acute distress; regular rate & rhythm, no murmurs; lungs clear bilaterally; abdomen soft non-tender & nondistended; extremities warm & well perfused, no peripheral edema, with palpable pulses over all 4 extremeties.    Patient was medically optimized, stable and ready for discharge. Plan of care and return precautions were discussed with the patient who verbally stated understanding. On the day of discharge, the patient was seen and examined. Symptoms improved. Vital signs are stable. Labs and imaging reviewed. Patient is medically optimized and hemodynamically stable. Return precautions discussed, medication teach back done, and importance of physician follow up emphasized. The patient verbalized understanding. #Discharge: do not delete  Visit Summary: presented 07-07-25    Ms. 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 She was admitted for concerns of encephalopathy vs. vagal nerve stimular malfunction vs. TIA.    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 Per home health aide in ED patient was also febrile.  Normally more alert however patient very somnolent, she only responsive to tactile stimuli and falls back asleep. Upon my evaluation no HHA or family present. Pt awake and responding to questions/following commands but A&Ox1. She acknowledges questions but unable to fully comprehend and provide actual answer.     In the ED, she had a fever of (101.8 rectal), heart rate of 75bpm, BP of 121/66, RR 16 O2 98% on 2 liters of nasal canula. Her labs showed that she had a normal white blood cell count, a negative urinalysis, negative RVP, normal thyroid function studies, an an elevated Lactate. She received a head CT in order to rule out a stroke or aneurysm-CT was negative. A CT angio of the chest and a CT of the abdomen and pelvis which revealed a soft tissue swelling of the right shoulder (which had been seen on prior imaging on 6/26/25), but had increased in size. There were no acute intraabdominal findings. She received Ofirmev, Levaquin, and normal saline IV in the ED.    Problem List by Priority:  #Acute Encephalopathy  Patient has a history of epilepsy, treated by her team at [facility]. She is managed with Xcopri/Clobazam. Pt p/w 1 day of somnolence and declining AMS from baseline, according to HHA.   Findings: On exam pt follows commands, but further ROS limited 2/2 baseline mental status. UA negative in ED. CT head negative r/o acute intracranial causes including hydrocephalus, mass effect, or acute intracranial hemorrhage. Normal B12, folate. RPR negative. Utox positive for benzos (i/s/o clozabam). VEEG revealed no current epilepsy. She received a Gallium whole body scan while inpatient to rule out other sources of infection/etiology of altered mental status. MRI of the head was deferred due to improving symptoms and low suspicion for HSV encephalitis.   Plan: Continue to monitor for symptoms. Suggestion to follow up with outpatient [facility] epilepsy center (Dr. Belcher).     #Urinary Retention  Patient found to have a significant stool burden on AXR that may have been leading to her urinary retention.   Intervention: Patient was kept on regular bladder scans and was straight cathed when necessary. She was placed on a bowel regimen which included bisocoydl, senna, miralax, and both a water and mineral oil enema.   Plan: Continue to monitor for bladder distension, urinary symptoms.    #Fever  Patient came into the ED with a fever. She was found to have a negative UA and RVP. CT revealed enlargement of a prior soft tissue swelling of the right arm, but this was ruled out as a source of infection through RUE US, CBC, and physical exam which showed no signs of erythema or edema.   Original blood cultures revealed staph epi bacteremia which was treated with Vancomycin. Her blood cultures to follow were all negative after 48 hours.   Plan: Continue XXX antibitoic outpatinet. Continue to monitor for worsening symptoms.     #History of Epilepsy  Patient seen outpatient at the [facility] Epilepsy clinic with Dr. Belcher (who was consulted while she was inpatient for collateral information regarding baseline mental status, epilepsy status, and epilepsy medication management).   Interventions: Patient was continued on her home Xcopri/Clobazam regimen.   Plan: Continue with home meds    #Hypothyroidism  Patient with a chornic hx of hypothyroidism. Thyroid function tests normal on admission.   Intervention: She was continued on her home Synthroid.  Plan: Continue home medications.     #Hyperlipidemia  Patient with chronic hyperlipidema.   Intervention: Home Crestor was continued.   Plan: Continue with home medications.     #Prophylaxis  Patient was given Lovenox while inpatient as a prophylactic measure i/s/o decreased mobility and minimal ambulation.   Plan: Monitor for symptoms.       Overall Plan:  -PCP followup established for patient, and patient made aware, discussed return instructions with teachback.  -Epilepsy follow up with [facility]    Patient was discharged to:  Home    New medications: N/A     Changes to old medications: N/A      Medications that were stopped: N/A  Items to follow up as outpatient:  PCP followup     Physical exam at the time of discharge:   Awake, alert and oriented to person place & time, with no focal deficits; PERRL, no acute distress; regular rate & rhythm, no murmurs; lungs clear bilaterally; abdomen soft non-tender & nondistended; extremities warm & well perfused, no peripheral edema, with palpable pulses over all 4 extremeties.    Patient was medically optimized, stable and ready for discharge. Plan of care and return precautions were discussed with the patient who verbally stated understanding. On the day of discharge, the patient was seen and examined. Symptoms improved. Vital signs are stable. Labs and imaging reviewed. Patient is medically optimized and hemodynamically stable. Return precautions discussed, medication teach back done, and importance of physician follow up emphasized. The patient verbalized understanding.   #Discharge: do not delete  Visit Summary: presented 07-07-25    Ms. 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 She was admitted for concerns of encephalopathy vs. vagal nerve stimulation. malfunction vs. TIA.    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 Per home health aide in ED patient was also febrile.  Normally more alert however patient very somnolent, she only responsive to tactile stimuli and falls back asleep. Upon my evaluation no HHA or family present. Pt awake and responding to questions/following commands but A&Ox1. She acknowledges questions but unable to fully comprehend and provide actual answer.     In the ED, she had a fever of (101.8 rectal), heart rate of 75bpm, BP of 121/66, RR 16 O2 98% on 2 liters of nasal canula. Her labs showed that she had a normal white blood cell count, a negative urinalysis, negative RVP, normal thyroid function studies, an an elevated Lactate. She received a head CT in order to rule out a stroke or aneurysm-CT was negative. A CT angio of the chest and a CT of the abdomen and pelvis which revealed a soft tissue swelling of the right shoulder (which had been seen on prior imaging on 6/26/25), but had increased in size. There were no acute intraabdominal findings. She received Ofirmev, Levaquin, and normal saline IV in the ED.    Problem List by Priority:  #Acute Encephalopathy  Patient has a history of epilepsy, treated by her team at [facility]. She is managed with Xcopri/Clobazam. Pt p/w 1 day of somnolence and declining AMS from baseline, according to HHA.   Findings: On exam pt follows commands, but further ROS limited 2/2 baseline mental status. UA negative in ED. CT head negative r/o acute intracranial causes including hydrocephalus, mass effect, or acute intracranial hemorrhage. Normal B12, folate. RPR negative. Utox positive for benzos (i/s/o clozabam). VEEG revealed no current epilepsy. She received a Gallium whole body scan while inpatient to rule out other sources of infection/etiology of altered mental status. MRI of the head was deferred due to improving symptoms and low suspicion for HSV encephalitis.   Plan: Continue to monitor for symptoms. Suggestion to follow up with outpatient [facility] epilepsy center (Dr. Belcher).     #Urinary Retention  Patient found to have a significant stool burden on AXR that may have been leading to her urinary retention.   Intervention: Patient was kept on regular bladder scans and was straight cathed when necessary. She was placed on a bowel regimen which included bisacodyl, senna, miralax, and both a water and mineral oil enema.   Plan: Continue to monitor for bladder distension, urinary symptoms.    #Fever  Patient came into the ED with a fever. She was found to have a negative UA and RVP. CT revealed enlargement of a prior soft tissue swelling of the right arm, but this was ruled out as a source of infection through RUE US, CBC, and physical exam which showed no signs of erythema or edema.   Original blood cultures revealed staph epi bacteremia which was treated with Vancomycin. Her blood cultures to follow were all negative after 48 hours.   Plan: Continue XXX antibiotic outpatient. Continue to monitor for worsening symptoms.     #History of Epilepsy  Patient seen outpatient at the [facility] Epilepsy clinic with Dr. Belcher (who was consulted while she was inpatient for collateral information regarding baseline mental status, epilepsy status, and epilepsy medication management).   Interventions: Patient was continued on her home Xcopri/Clobazam regimen.   Plan: Continue with home meds    #Hypothyroidism  Patient with a chronic hx of hypothyroidism. Thyroid function tests normal on admission.   Intervention: She was continued on her home Synthroid.  Plan: Continue home medications.     #Hyperlipidemia  Patient with chronic hyperlipidemia.   Intervention: Home Crestor was continued.   Plan: Continue with home medications.     #Prophylaxis  Patient was given Lovenox while inpatient as a prophylactic measure i/s/o decreased mobility and minimal ambulation.   Plan: Monitor for symptoms.       Overall Plan:  -PCP followup established for patient, and patient made aware, discussed return instructions with teachback.  -Epilepsy follow up with [facility]    Patient was discharged to:  Home    New medications: N/A     Changes to old medications: N/A      Medications that were stopped: N/A  Items to follow up as outpatient:  PCP followup     Physical exam at the time of discharge:   Awake, alert and oriented to person place & time, with no focal deficits; PERRL, no acute distress; regular rate & rhythm, no murmurs; lungs clear bilaterally; abdomen soft non-tender & nondistended; extremities warm & well perfused, no peripheral edema, with palpable pulses over all 4 extremeties.    Patient was medically optimized, stable and ready for discharge. Plan of care and return precautions were discussed with the patient who verbally stated understanding. On the day of discharge, the patient was seen and examined. Symptoms improved. Vital signs are stable. Labs and imaging reviewed. Patient is medically optimized and hemodynamically stable. Return precautions discussed, medication teach back done, and importance of physician follow up emphasized. The patient verbalized understanding.   #Discharge: do not delete  Visit Summary: presented 07-07-25    Ms. 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 She was admitted for concerns of encephalopathy vs. vagal nerve stimulation. malfunction vs. TIA.    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 Per home health aide in ED patient was also febrile.  Normally more alert however patient very somnolent, she only responsive to tactile stimuli and falls back asleep. Upon my evaluation no HHA or family present. Pt awake and responding to questions/following commands but A&Ox1. She acknowledges questions but unable to fully comprehend and provide actual answer.     In the ED, she had a fever of (101.8 rectal), heart rate of 75bpm, BP of 121/66, RR 16 O2 98% on 2 liters of nasal canula. Her labs showed that she had a normal white blood cell count, a negative urinalysis, negative RVP, normal thyroid function studies, an an elevated Lactate. She received a head CT in order to rule out a stroke or aneurysm-CT was negative. A CT angio of the chest and a CT of the abdomen and pelvis which revealed a soft tissue swelling of the right shoulder (which had been seen on prior imaging on 6/26/25), but had increased in size. There were no acute intraabdominal findings. She received Ofirmev, Levaquin, and normal saline IV in the ED.    Problem List by Priority:  #Acute Encephalopathy  Patient has a history of epilepsy, treated by her team at [facility]. She is managed with Xcopri/Clobazam. Pt p/w 1 day of somnolence and declining AMS from baseline, according to HHA.   Findings: On exam pt follows commands, but further ROS limited 2/2 baseline mental status. UA negative in ED. CT head negative r/o acute intracranial causes including hydrocephalus, mass effect, or acute intracranial hemorrhage. Normal B12, folate. RPR negative. Utox positive for benzos (i/s/o clozabam). VEEG revealed no current epilepsy. She received a Gallium whole body scan while inpatient to rule out other sources of infection/etiology of altered mental status. MRI of the head was deferred due to improving symptoms and low suspicion for HSV encephalitis.   Plan: Continue to monitor for symptoms. Suggestion to follow up with outpatient [facility] epilepsy center (Dr. Belcehr).     #Urinary Retention  Patient found to have a significant stool burden on AXR that may have been leading to her urinary retention.   Intervention: Patient was kept on regular bladder scans and was straight cathed when necessary. She was placed on a bowel regimen which included bisacodyl, senna, miralax, and both a water and mineral oil enema.   Plan: Continue to monitor for bladder distension, urinary symptoms.    #Fever  Patient came into the ED with a fever. She was found to have a negative UA and RVP. CT revealed enlargement of a prior soft tissue swelling of the right arm, but this was ruled out as a source of infection through RUE US, CBC, and physical exam which showed no signs of erythema or edema.   Original blood cultures revealed staph epi bacteremia which was treated with Vancomycin. Her blood cultures to follow were all negative after 48 hours.   Plan: Cefpodoxime (5 d total) antibiotic outpatient. Continue to monitor for worsening symptoms.     #History of Epilepsy  Patient seen outpatient at the [facility] Epilepsy clinic with Dr. Belcher (who was consulted while she was inpatient for collateral information regarding baseline mental status, epilepsy status, and epilepsy medication management).   Interventions: Patient was continued on her home Xcopri/Clobazam regimen.   Plan: Continue with home meds    #Hypothyroidism  Patient with a chronic hx of hypothyroidism. Thyroid function tests normal on admission.   Intervention: She was continued on her home Synthroid.  Plan: Continue home medications.     #Hyperlipidemia  Patient with chronic hyperlipidemia.   Intervention: Home Crestor was continued.   Plan: Continue with home medications.     #Prophylaxis  Patient was given Lovenox while inpatient as a prophylactic measure i/s/o decreased mobility and minimal ambulation.   Plan: Monitor for symptoms.       Overall Plan:  -PCP followup established for patient, and patient made aware, discussed return instructions with teachback.  -Epilepsy follow up with [facility]    Patient was discharged to:  Home    New medications: N/A     Changes to old medications: N/A      Medications that were stopped: N/A  Items to follow up as outpatient:  PCP followup     Physical exam at the time of discharge:   Awake, alert and oriented to person place & time, with no focal deficits; PERRL, no acute distress; regular rate & rhythm, no murmurs; lungs clear bilaterally; abdomen soft non-tender & nondistended; extremities warm & well perfused, no peripheral edema, with palpable pulses over all 4 extremeties.    Patient was medically optimized, stable and ready for discharge. Plan of care and return precautions were discussed with the patient who verbally stated understanding. On the day of discharge, the patient was seen and examined. Symptoms improved. Vital signs are stable. Labs and imaging reviewed. Patient is medically optimized and hemodynamically stable. Return precautions discussed, medication teach back done, and importance of physician follow up emphasized. The patient verbalized understanding.   #Discharge: do not delete  Visit Summary: presented 07-07-25    Ms. 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 She was admitted for concerns of encephalopathy vs. vagal nerve stimulation. malfunction vs. TIA.    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 Per home health aide in ED patient was also febrile.  Normally more alert however patient very somnolent, she only responsive to tactile stimuli and falls back asleep. Upon my evaluation no HHA or family present. Pt awake and responding to questions/following commands but A&Ox1. She acknowledges questions but unable to fully comprehend and provide actual answer.     In the ED, she had a fever of (101.8 rectal), heart rate of 75bpm, BP of 121/66, RR 16 O2 98% on 2 liters of nasal canula. Her labs showed that she had a normal white blood cell count, a negative urinalysis, negative RVP, normal thyroid function studies, an an elevated Lactate. She received a head CT in order to rule out a stroke or aneurysm-CT was negative. A CT angio of the chest and a CT of the abdomen and pelvis which revealed a soft tissue swelling of the right shoulder (which had been seen on prior imaging on 6/26/25), but had increased in size. There were no acute intraabdominal findings. She received Ofirmev, Levaquin, and normal saline IV in the ED.    Problem List by Priority:  #Acute Encephalopathy  Patient has a history of epilepsy, treated by her team at [facility]. She is managed with Xcopri/Clobazam. Pt p/w 1 day of somnolence and declining AMS from baseline, according to HHA.   Findings: On exam pt follows commands, but further ROS limited 2/2 baseline mental status. UA negative in ED. CT head negative r/o acute intracranial causes including hydrocephalus, mass effect, or acute intracranial hemorrhage. Normal B12, folate. RPR negative. Utox positive for benzos (i/s/o clozabam). VEEG revealed no current epilepsy. She received a Gallium whole body scan while inpatient to rule out other sources of infection/etiology of altered mental status. MRI of the head was deferred due to improving symptoms and low suspicion for HSV encephalitis.   Plan: Continue to monitor for symptoms. Suggestion to follow up with outpatient [facility] epilepsy center (Dr. Maricel Belcher).     #Urinary Retention  Patient found to have a significant stool burden on AXR that may have been leading to her urinary retention.   Intervention: Patient was kept on regular bladder scans and was straight cathed when necessary. She was placed on a bowel regimen which included bisacodyl, senna, miralax, and both a water and mineral oil enema.   Plan: Continue to monitor for bladder distension, urinary symptoms.    #Fever  Patient came into the ED with a fever. She was found to have a negative UA and RVP. CT revealed enlargement of a prior soft tissue swelling of the right arm, but this was ruled out as a source of infection through RUE US, CBC, and physical exam which showed no signs of erythema or edema.   Original blood cultures revealed staph epi bacteremia which was treated with Vancomycin. Her blood cultures to follow were all negative after 48 hours.   Plan: Cefpodoxime (5 d total) antibiotic outpatient. Continue to monitor for worsening symptoms.     #History of Epilepsy  Patient seen outpatient at the [facility] Epilepsy clinic with Dr. Belcher (who was consulted while she was inpatient for collateral information regarding baseline mental status, epilepsy status, and epilepsy medication management).   Interventions: Patient was continued on her home Xcopri/Clobazam regimen.   Plan: Continue with home meds    #Hypothyroidism  Patient with a chronic hx of hypothyroidism. Thyroid function tests normal on admission.   Intervention: She was continued on her home Synthroid.  Plan: Continue home medications.     #Hyperlipidemia  Patient with chronic hyperlipidemia.   Intervention: Home Crestor was continued.   Plan: Continue with home medications.     #Prophylaxis  Patient was given Lovenox while inpatient as a prophylactic measure i/s/o decreased mobility and minimal ambulation.   Plan: Monitor for symptoms.       Overall Plan:  -PCP followup established for patient, and patient made aware, discussed return instructions with teachback.  -Epilepsy follow up with [facility]    Patient was discharged to:  Home    New medications: N/A     Changes to old medications: N/A      Medications that were stopped: N/A  Items to follow up as outpatient:  PCP followup     Physical exam at the time of discharge:   Awake, alert and oriented to person place & time, with no focal deficits; PERRL, no acute distress; regular rate & rhythm, no murmurs; lungs clear bilaterally; abdomen soft non-tender & nondistended; extremities warm & well perfused, no peripheral edema, with palpable pulses over all 4 extremeties.    Patient was medically optimized, stable and ready for discharge. Plan of care and return precautions were discussed with the patient who verbally stated understanding. On the day of discharge, the patient was seen and examined. Symptoms improved. Vital signs are stable. Labs and imaging reviewed. Patient is medically optimized and hemodynamically stable. Return precautions discussed, medication teach back done, and importance of physician follow up emphasized. The patient verbalized understanding.   #Discharge: do not delete  Visit Summary: presented 07-07-25    Ms. 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 She was admitted for concerns of encephalopathy vs. vagal nerve stimulation. malfunction vs. TIA.    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 Per home health aide in ED patient was also febrile.  Normally more alert however patient very somnolent, she only responsive to tactile stimuli and falls back asleep. Upon my evaluation no HHA or family present. Pt awake and responding to questions/following commands but A&Ox1. She acknowledges questions but unable to fully comprehend and provide actual answer.     In the ED, she had a fever of (101.8 rectal), heart rate of 75bpm, BP of 121/66, RR 16 O2 98% on 2 liters of nasal canula. Her labs showed that she had a normal white blood cell count, a negative urinalysis, negative RVP, normal thyroid function studies, an an elevated Lactate. She received a head CT in order to rule out a stroke or aneurysm-CT was negative. A CT angio of the chest and a CT of the abdomen and pelvis which revealed a soft tissue swelling of the right shoulder (which had been seen on prior imaging on 6/26/25), but had increased in size. There were no acute intraabdominal findings. She received Ofirmev, Levaquin, and normal saline IV in the ED.    Problem List by Priority:  #Acute Encephalopathy  Patient has a history of epilepsy, treated by her team at [facility]. She is managed with Xcopri/Clobazam. Pt p/w 1 day of somnolence and declining AMS from baseline, according to HHA.   Findings: On exam pt follows commands, but further ROS limited 2/2 baseline mental status. UA negative in ED. CT head negative r/o acute intracranial causes including hydrocephalus, mass effect, or acute intracranial hemorrhage. Normal B12, folate. RPR negative. Utox positive for benzos (i/s/o clozabam). VEEG revealed no current epilepsy. She received a Gallium whole body scan while inpatient to rule out other sources of infection/etiology of altered mental status. MRI of the head was deferred due to improving symptoms and low suspicion for HSV encephalitis.   Plan: Continue to monitor for symptoms. Suggestion to follow up with outpatient [facility] epilepsy center (Dr. Maricel Belcher).     #Urinary Retention  Patient found to have a significant stool burden on AXR that may have been leading to her urinary retention.   Intervention: Patient was kept on regular bladder scans and was straight cathed when necessary. She was placed on a bowel regimen which included bisacodyl, senna, miralax, and both a water and mineral oil enema.   Plan: Continue to monitor for bladder distension, urinary symptoms.    #Fever  Patient came into the ED with a fever. She was found to have a negative UA and RVP. CT revealed enlargement of a prior soft tissue swelling of the right arm, but this was ruled out as a source of infection through RUE US, CBC, and physical exam which showed no signs of erythema or edema.   Original blood cultures revealed staph epi bacteremia which was treated with Vancomycin. Her blood cultures to follow were all negative after 48 hours.   Plan: Cefpodoxime (5 d total) antibiotic outpatient. Continue to monitor for worsening symptoms.     #History of Epilepsy  Patient seen outpatient at the [facility] Epilepsy clinic with Dr. Belcher (who was consulted while she was inpatient for collateral information regarding baseline mental status, epilepsy status, and epilepsy medication management).   Interventions: Patient was continued on her home Xcopri/Clobazam regimen.   Plan: Continue with home meds    #Hypothyroidism  Patient with a chronic hx of hypothyroidism. Thyroid function tests normal on admission.   Intervention: She was continued on her home Synthroid.  Plan: Continue home medications.     #Hyperlipidemia  Patient with chronic hyperlipidemia.   Intervention: Home Crestor was continued.   Plan: Continue with home medications.     #Prophylaxis  Patient was given Lovenox while inpatient as a prophylactic measure i/s/o decreased mobility and minimal ambulation.   Plan: Monitor for symptoms.       Overall Plan:  -PCP followup established for patient, and patient made aware, discussed return instructions with teachback.  -Epilepsy follow up with [facility]    Patient was discharged to:  Home    New medications: N/A     Changes to old medications: N/A      Medications that were stopped: N/A  Items to follow up as outpatient:  PCP followup     Physical exam at the time of discharge:   Awake, alert and oriented to person place & time, with no focal deficits; PERRL, no acute distress; regular rate & rhythm, no murmurs; lungs clear bilaterally; abdomen soft non-tender & nondistended; extremities warm & well perfused, no peripheral edema, with palpable pulses over all 4 extremeties.    Patient was medically optimized, stable and ready for discharge. Plan of care and return precautions were discussed with the patient who verbally stated understanding. On the day of discharge, the patient was seen and examined. Symptoms improved. Vital signs are stable. Labs and imaging reviewed. Patient is medically optimized and hemodynamically stable. Return precautions discussed, medication teach back done, and importance of physician follow up emphasized. The patient verbalized understanding.    ******# Addendum for clarification of clinical documentation dated 8/13/25  # Gram positive pneumonia   Gallium scan, SPECT suggestive of LLL PNA. Likely gram positive given response to empiric vancomycin  Discussed with ID, completing course of abx with 5 days of cefpodoxime 200mg BID to treat gram positive pneumonia

## 2025-07-07 NOTE — H&P ADULT - PROBLEM SELECTOR PLAN 5
Fluids: S/P 2400cc in ED, passed bedside dysphagia encourage PO  Electrolytes: Replete as needed  Diet: puree  DVT: Lovenox  GI: Famotidine  Dispo: RMF  Code: Full code Per sure scripts on crestor 5mg qhs    -C/W home med

## 2025-07-07 NOTE — PROVIDER CONTACT NOTE (CRITICAL VALUE NOTIFICATION) - PERSON GIVING RESULT:
[facility] - Janis Bettencourt
[facility] - Holley Metz

## 2025-07-07 NOTE — PROGRESS NOTE ADULT - PROBLEM SELECTOR PLAN 3
Last seen for AMS 1/22  Home meds per sure scripts: Clobazam 10mg in AM, clobazam 15mg in PM, Xcopri 200mg in AM, Xcopri 50mg qhs, mirtazapine 15mg qhs  HHA not present at bedside to confirm medications    -Resume AM medications  -Confirm med rec in AM  -F/U levels as above  - B12 and folate supplementation Last seen for AMS 1/22  Home meds per sure scripts: Clobazam 10mg in AM, clobazam 15mg in PM, Xcopri 200mg in AM, Xcopri 50mg qhs, mirtazapine 15mg qhs  HHA not present at bedside to confirm medications    -Resume AM medications  -F/U levels as above

## 2025-07-07 NOTE — DISCHARGE NOTE PROVIDER - CARE PROVIDER_API CALL
Humberto Galarza  Neurology  Follow Up Time:    Humberto Galarza  Neurology  Established Patient  Follow Up Time:    [place], [place]  [facility]  Phone: [phone]  Fax: (   )    -  Established Patient  Follow Up Time: 1 week

## 2025-07-07 NOTE — DISCHARGE NOTE PROVIDER - PROVIDER TOKENS
PROVIDER:[TOKEN:[[id number]:[id number]]] PROVIDER:[TOKEN:[[id number]:[id number]],ESTABLISHEDPATIENT:[T]] FREE:[LAST:[Faroosuman],FIRST:[Maricel],PHONE:[phone]],FAX:[(   [phone]    -],ADDRESS:[[facility]],FOLLOWUP:[1 week],ESTABLISHEDPATIENT:[T]]

## 2025-07-07 NOTE — PROGRESS NOTE ADULT - SUBJECTIVE AND OBJECTIVE BOX
INTERVAL EVENTS: no significant overnight events.     SUBJECTIVE: No additional complaints or concerns reported by patient.   No cough, chest pain, fevers/chills, shortness of breath. ROS negative otherwise.     Vital Signs Last 24 Hrs  T(C): 36.6 (07 Jul 2025 05:57), Max: 38.8 (06 Jul 2025 17:18)  T(F): 97.9 (07 Jul 2025 05:57), Max: 101.8 (06 Jul 2025 17:18)  HR: 65 (07 Jul 2025 05:57) (62 - 90)  BP: 128/61 (07 Jul 2025 05:57) (110/72 - 146/83)  BP(mean): 83 (07 Jul 2025 05:57) (82 - 83)  RR: 18 (07 Jul 2025 05:57) (15 - 18)  SpO2: 96% (07 Jul 2025 05:57) (90% - 99%)    Parameters below as of 07 Jul 2025 05:57  Patient On (Oxygen Delivery Method): room air    Orthostatic VS  I&O's Summary    06 Jul 2025 07:01  -  07 Jul 2025 06:43  --------------------------------------------------------  IN: 0 mL / OUT: 419 mL / NET: -419 mL        Exam  GENERAL/NEURO Awake, alert, NCAT, following commands, CN grossly intact  ENT: MMM, PERRL  CARDIOVASCULAR: RRR, no murmur  RESPIRATORY: No wheeze/rhonchi/crackles, no accessory muscle use  ABDOMEN: Abdomen soft, NT/ND, bowel sounds x4  SKIN/EXTREMETIES: No rashes, warm, no edema over lower extremeties. Able to move all 4 extremeties, DP intact, reflexes grossly normal.                          14.0   7.58  )-----------( 182      ( 06 Jul 2025 17:13 )             44.0   07-06    141  |  104  |  16  ----------------------------<  126[H]  4.1   |  30  |  0.58    Ca    9.0      06 Jul 2025 17:13    TPro  7.5  /  Alb  3.1[L]  /  TBili  0.4  /  DBili  x   /  AST  20  /  ALT  18  /  AlkPhos  148[H]  07-06      Allergies    penicillin (Anaphylaxis)  aspirin (Other)    Intolerances     Home Medications:  cloBAZam 10 mg oral tablet: 1 tab(s) orally once a day in AM (07 Jul 2025 04:17)  cloBAZam 5 mg oral tablet: 3 tab(s) orally once a day in PM (07 Jul 2025 04:17)  Crestor 5 mg oral tablet: 1 tab(s) orally once a day (07 Jul 2025 04:17)  thiamine 100 mg oral tablet: 1 tab(s) orally once a day (07 Jul 2025 04:17)  Vitamin B-12 1000 mcg oral tablet: 1 tab(s) orally once a day (07 Jul 2025 04:17)  Xcopri 200 mg oral tablet: 1 tab(s) orally once a day in the morning (07 Jul 2025 04:17)  Xcopri 50 mg oral tablet: 1 tab(s) orally once a day in the evening (07 Jul 2025 04:17)  MEDICATIONS  (STANDING):  artificial tears (preservative free) Ophthalmic Solution 1 Drop(s) Both EYES daily  bisacodyl 5 milliGRAM(s) Oral at bedtime  cenobamate 200 milliGRAM(s) Oral every 24 hours  cloBAZam 10 milliGRAM(s) Oral every 24 hours  enoxaparin Injectable 40 milliGRAM(s) SubCutaneous every 24 hours  famotidine    Tablet 20 milliGRAM(s) Oral daily  folic acid 1 milliGRAM(s) Oral daily  levothyroxine 88 MICROGram(s) Oral every 24 hours  polyethylene glycol 3350 17 Gram(s) Oral every 12 hours  rosuvastatin 5 milliGRAM(s) Oral at bedtime  senna 2 Tablet(s) Oral at bedtime  thiamine 100 milliGRAM(s) Oral every 24 hours    MEDICATIONS  (PRN):  Activity - Ambulate with Assistance:     Time/Priority:  Routine (07-07-25 @ 04:50) [Active]  Diet, Pureed (07-07-25 @ 04:50) [Active]       INTERVAL EVENTS: Endorses doing well this AM. Denies pain.    SUBJECTIVE: No additional complaints or concerns reported by patient.   No cough, chest pain, fevers/chills, shortness of breath. ROS negative otherwise.     Vital Signs Last 24 Hrs  T(C): 36.6 (07 Jul 2025 05:57), Max: 38.8 (06 Jul 2025 17:18)  T(F): 97.9 (07 Jul 2025 05:57), Max: 101.8 (06 Jul 2025 17:18)  HR: 65 (07 Jul 2025 05:57) (62 - 90)  BP: 128/61 (07 Jul 2025 05:57) (110/72 - 146/83)  BP(mean): 83 (07 Jul 2025 05:57) (82 - 83)  RR: 18 (07 Jul 2025 05:57) (15 - 18)  SpO2: 96% (07 Jul 2025 05:57) (90% - 99%)    Parameters below as of 07 Jul 2025 05:57  Patient On (Oxygen Delivery Method): room air    Orthostatic VS  I&O's Summary    06 Jul 2025 07:01  -  07 Jul 2025 06:43  --------------------------------------------------------  IN: 0 mL / OUT: 419 mL / NET: -419 mL        Exam  GENERAL/NEURO: Laying comfortably in bed with VEEG attached, AxO x 0  ENT: dry mucous membranes, pupils dilates to 5-6mm reactive to light  CARDIOVASCULAR: RRR, no murmur  RESPIRATORY: No wheeze/rhonchi/crackles, no accessory muscle use  ABDOMEN: Abdomen soft, NT/ND, bowel sounds x4  SKIN/EXTREMETIES: IV in anterior RLE. Right upper extremity/shoulder without warmth or edema                                     14.0   7.58  )-----------( 182      ( 06 Jul 2025 17:13 )             44.0   07-06    141  |  104  |  16  ----------------------------<  126[H]  4.1   |  30  |  0.58    Ca    9.0      06 Jul 2025 17:13    TPro  7.5  /  Alb  3.1[L]  /  TBili  0.4  /  DBili  x   /  AST  20  /  ALT  18  /  AlkPhos  148[H]  07-06      Allergies    penicillin (Anaphylaxis)  aspirin (Other)    Intolerances     Home Medications:  cloBAZam 10 mg oral tablet: 1 tab(s) orally once a day in AM (07 Jul 2025 04:17)  cloBAZam 5 mg oral tablet: 3 tab(s) orally once a day in PM (07 Jul 2025 04:17)  Crestor 5 mg oral tablet: 1 tab(s) orally once a day (07 Jul 2025 04:17)  thiamine 100 mg oral tablet: 1 tab(s) orally once a day (07 Jul 2025 04:17)  Vitamin B-12 1000 mcg oral tablet: 1 tab(s) orally once a day (07 Jul 2025 04:17)  Xcopri 200 mg oral tablet: 1 tab(s) orally once a day in the morning (07 Jul 2025 04:17)  Xcopri 50 mg oral tablet: 1 tab(s) orally once a day in the evening (07 Jul 2025 04:17)  MEDICATIONS  (STANDING):  artificial tears (preservative free) Ophthalmic Solution 1 Drop(s) Both EYES daily  bisacodyl 5 milliGRAM(s) Oral at bedtime  cenobamate 200 milliGRAM(s) Oral every 24 hours  cloBAZam 10 milliGRAM(s) Oral every 24 hours  enoxaparin Injectable 40 milliGRAM(s) SubCutaneous every 24 hours  famotidine    Tablet 20 milliGRAM(s) Oral daily  folic acid 1 milliGRAM(s) Oral daily  levothyroxine 88 MICROGram(s) Oral every 24 hours  polyethylene glycol 3350 17 Gram(s) Oral every 12 hours  rosuvastatin 5 milliGRAM(s) Oral at bedtime  senna 2 Tablet(s) Oral at bedtime  thiamine 100 milliGRAM(s) Oral every 24 hours    MEDICATIONS  (PRN):  Activity - Ambulate with Assistance:     Time/Priority:  Routine (07-07-25 @ 04:50) [Active]  Diet, Pureed (07-07-25 @ 04:50) [Active]       INTERVAL EVENTS: Endorses doing well this AM. Denies pain.    SUBJECTIVE: No additional complaints or concerns reported by patient.   No cough, chest pain, fevers/chills, shortness of breath. ROS negative otherwise.     Vital Signs Last 24 Hrs  T(C): 36.6 (07 Jul 2025 05:57), Max: 38.8 (06 Jul 2025 17:18)  T(F): 97.9 (07 Jul 2025 05:57), Max: 101.8 (06 Jul 2025 17:18)  HR: 65 (07 Jul 2025 05:57) (62 - 90)  BP: 128/61 (07 Jul 2025 05:57) (110/72 - 146/83)  BP(mean): 83 (07 Jul 2025 05:57) (82 - 83)  RR: 18 (07 Jul 2025 05:57) (15 - 18)  SpO2: 96% (07 Jul 2025 05:57) (90% - 99%)    Parameters below as of 07 Jul 2025 05:57  Patient On (Oxygen Delivery Method): room air    Orthostatic VS  I&O's Summary    06 Jul 2025 07:01  -  07 Jul 2025 06:43  --------------------------------------------------------  IN: 0 mL / OUT: 419 mL / NET: -419 mL        Exam  GENERAL/NEURO: Laying comfortably in bed with VEEG attached, AxO x 0  ENT: dry mucous membranes, pupils dilates to 5-6mm reactive to light  CARDIOVASCULAR: RRR, no murmur  RESPIRATORY: No wheeze/rhonchi/crackles, no accessory muscle use  ABDOMEN: Abdomen soft, NT/ND, bowel sounds x4  SKIN/EXTREMETIES: IV in anterior RLE. nonerythematous, nonedematous Right upper extremity/shoulder. denies pain with movement of RUE                                     14.0   7.58  )-----------( 182      ( 06 Jul 2025 17:13 )             44.0   07-06    141  |  104  |  16  ----------------------------<  126[H]  4.1   |  30  |  0.58    Ca    9.0      06 Jul 2025 17:13    TPro  7.5  /  Alb  3.1[L]  /  TBili  0.4  /  DBili  x   /  AST  20  /  ALT  18  /  AlkPhos  148[H]  07-06      Allergies    penicillin (Anaphylaxis)  aspirin (Other)    Intolerances     Home Medications:  cloBAZam 10 mg oral tablet: 1 tab(s) orally once a day in AM (07 Jul 2025 04:17)  cloBAZam 5 mg oral tablet: 3 tab(s) orally once a day in PM (07 Jul 2025 04:17)  Crestor 5 mg oral tablet: 1 tab(s) orally once a day (07 Jul 2025 04:17)  thiamine 100 mg oral tablet: 1 tab(s) orally once a day (07 Jul 2025 04:17)  Vitamin B-12 1000 mcg oral tablet: 1 tab(s) orally once a day (07 Jul 2025 04:17)  Xcopri 200 mg oral tablet: 1 tab(s) orally once a day in the morning (07 Jul 2025 04:17)  Xcopri 50 mg oral tablet: 1 tab(s) orally once a day in the evening (07 Jul 2025 04:17)  MEDICATIONS  (STANDING):  artificial tears (preservative free) Ophthalmic Solution 1 Drop(s) Both EYES daily  bisacodyl 5 milliGRAM(s) Oral at bedtime  cenobamate 200 milliGRAM(s) Oral every 24 hours  cloBAZam 10 milliGRAM(s) Oral every 24 hours  enoxaparin Injectable 40 milliGRAM(s) SubCutaneous every 24 hours  famotidine    Tablet 20 milliGRAM(s) Oral daily  folic acid 1 milliGRAM(s) Oral daily  levothyroxine 88 MICROGram(s) Oral every 24 hours  polyethylene glycol 3350 17 Gram(s) Oral every 12 hours  rosuvastatin 5 milliGRAM(s) Oral at bedtime  senna 2 Tablet(s) Oral at bedtime  thiamine 100 milliGRAM(s) Oral every 24 hours    MEDICATIONS  (PRN):  Activity - Ambulate with Assistance:     Time/Priority:  Routine (07-07-25 @ 04:50) [Active]  Diet, Pureed (07-07-25 @ 04:50) [Active]       INTERVAL EVENTS: Endorses doing well this AM. Denies pain.    SUBJECTIVE: No additional complaints or concerns reported by patient.   No cough, chest pain, fevers/chills, shortness of breath. ROS negative otherwise.     Vital Signs Last 24 Hrs  T(C): 36.6 (07 Jul 2025 05:57), Max: 38.8 (06 Jul 2025 17:18)  T(F): 97.9 (07 Jul 2025 05:57), Max: 101.8 (06 Jul 2025 17:18)  HR: 65 (07 Jul 2025 05:57) (62 - 90)  BP: 128/61 (07 Jul 2025 05:57) (110/72 - 146/83)  BP(mean): 83 (07 Jul 2025 05:57) (82 - 83)  RR: 18 (07 Jul 2025 05:57) (15 - 18)  SpO2: 96% (07 Jul 2025 05:57) (90% - 99%)    Parameters below as of 07 Jul 2025 05:57  Patient On (Oxygen Delivery Method): room air    Orthostatic VS  I&O's Summary    06 Jul 2025 07:01  -  07 Jul 2025 06:43  --------------------------------------------------------  IN: 0 mL / OUT: 419 mL / NET: -419 mL        Exam  GENERAL/NEURO: Laying comfortably in bed with VEEG attached, AxO x 0  ENT: dry mucous membranes, pupils dilates to 5-6mm reactive to light  CARDIOVASCULAR: RRR, no murmur  RESPIRATORY: No wheeze/rhonchi/crackles, no accessory muscle use  ABDOMEN: Abdomen soft, NT/ND, bowel sounds x4  SKIN/EXTREMETIES: IV in anterior RLE. nonerythematous, nonedematous Right upper extremity/shoulder. denies pain with movement of RUE.                                      14.0   7.58  )-----------( 182      ( 06 Jul 2025 17:13 )             44.0   07-06    141  |  104  |  16  ----------------------------<  126[H]  4.1   |  30  |  0.58    Ca    9.0      06 Jul 2025 17:13    TPro  7.5  /  Alb  3.1[L]  /  TBili  0.4  /  DBili  x   /  AST  20  /  ALT  18  /  AlkPhos  148[H]  07-06      Allergies    penicillin (Anaphylaxis)  aspirin (Other)    Intolerances     Home Medications:  cloBAZam 10 mg oral tablet: 1 tab(s) orally once a day in AM (07 Jul 2025 04:17)  cloBAZam 5 mg oral tablet: 3 tab(s) orally once a day in PM (07 Jul 2025 04:17)  Crestor 5 mg oral tablet: 1 tab(s) orally once a day (07 Jul 2025 04:17)  thiamine 100 mg oral tablet: 1 tab(s) orally once a day (07 Jul 2025 04:17)  Vitamin B-12 1000 mcg oral tablet: 1 tab(s) orally once a day (07 Jul 2025 04:17)  Xcopri 200 mg oral tablet: 1 tab(s) orally once a day in the morning (07 Jul 2025 04:17)  Xcopri 50 mg oral tablet: 1 tab(s) orally once a day in the evening (07 Jul 2025 04:17)  MEDICATIONS  (STANDING):  artificial tears (preservative free) Ophthalmic Solution 1 Drop(s) Both EYES daily  bisacodyl 5 milliGRAM(s) Oral at bedtime  cenobamate 200 milliGRAM(s) Oral every 24 hours  cloBAZam 10 milliGRAM(s) Oral every 24 hours  enoxaparin Injectable 40 milliGRAM(s) SubCutaneous every 24 hours  famotidine    Tablet 20 milliGRAM(s) Oral daily  folic acid 1 milliGRAM(s) Oral daily  levothyroxine 88 MICROGram(s) Oral every 24 hours  polyethylene glycol 3350 17 Gram(s) Oral every 12 hours  rosuvastatin 5 milliGRAM(s) Oral at bedtime  senna 2 Tablet(s) Oral at bedtime  thiamine 100 milliGRAM(s) Oral every 24 hours    MEDICATIONS  (PRN):  Activity - Ambulate with Assistance:     Time/Priority:  Routine (07-07-25 @ 04:50) [Active]  Diet, Pureed (07-07-25 @ 04:50) [Active]

## 2025-07-07 NOTE — H&P ADULT - PROBLEM SELECTOR PLAN 3
Per [facility] scripts on levothyroxine 88mcg qd  TSH 0.856    -C/W home med Last seen for AMS 1/22  Home meds per sure scripts: Clobazam 10mg in AM, clobazam 15mg in PM, Xcopri 200mg in AM, Xcopri 50mg qhs, mirtazapine 15mg qhs  HHA not present at bedside to confirm medications    -Resume AM medications  -Confirm med rec in AM  -F/U levels as above  - B12 and folate supplementation

## 2025-07-07 NOTE — DISCHARGE NOTE PROVIDER - NSDCCPCAREPLAN_GEN_ALL_CORE_FT
PRINCIPAL DISCHARGE DIAGNOSIS  Diagnosis: Altered mental status  Assessment and Plan of Treatment:      PRINCIPAL DISCHARGE DIAGNOSIS  Diagnosis: Altered mental status  Assessment and Plan of Treatment: Entered with AMS, treated with abx and improvement was noted     PRINCIPAL DISCHARGE DIAGNOSIS  Diagnosis: Altered mental status  Assessment and Plan of Treatment: Entered with AMS, treated with abx and improvement was noted. On discharge continue VANTIN (Cefodoximpime) on discharge for 5d (starting 7/11 and ending 7/15)     PRINCIPAL DISCHARGE DIAGNOSIS  Diagnosis: Altered mental status  Assessment and Plan of Treatment: Entered with AMS, treated with abx and improvement was noted. On discharge continue VANTIN (Cefpodoxime) on discharge for 5d (starting 7/11 and ending 7/15)      SECONDARY DISCHARGE DIAGNOSES  Diagnosis: Acute metabolic encephalopathy  Assessment and Plan of Treatment: You had positive blood cultures for two different bacteria, this could have caused on inflammatory state worsening your epilepsy. On discharge please continue to take cefodoximipe for 5 days, starting 7/11 and ending on 7/15    Diagnosis: History of epilepsy  Assessment and Plan of Treatment: Was placed on vEEG - alterned mental status thought to be secondary to toxic metabolic encephalapathy due to a possible infection. On discharge please continue to take your Xcopri 250mg once daily and Clobazam 10mg once daily    Diagnosis: Hyperlipidemia  Assessment and Plan of Treatment: Continue your previous atorvastatin    Diagnosis: Urinary retention  Assessment and Plan of Treatment: Because you had less bowel movements, your bowels might have been pressing against your bladder and/or vessels leading out of bladder. We have prescribed miralax (17g twice daily - once in morning and once at evening) and senna (2 tablets at bed)    Diagnosis: Hypothyroid  Assessment and Plan of Treatment: Continue your levothroxine at your dose     PRINCIPAL DISCHARGE DIAGNOSIS  Diagnosis: Altered mental status  Assessment and Plan of Treatment: Entered with AMS, treated with abx and improvement was noted. On discharge continue VANTIN (Cefpodoxime at 200mg twice daily) on discharge for 5d (starting 7/11 and ending 7/15)      SECONDARY DISCHARGE DIAGNOSES  Diagnosis: Acute metabolic encephalopathy  Assessment and Plan of Treatment: You had positive blood cultures for two different bacteria, this could have caused on inflammatory state worsening your epilepsy. On discharge please continue to take cefodoximipe for 5 days, starting 7/11 and ending on 7/15    Diagnosis: History of epilepsy  Assessment and Plan of Treatment: Was placed on vEEG - alterned mental status thought to be secondary to toxic metabolic encephalapathy due to a possible infection. On discharge please continue to take your Xcopri 250mg once daily and Clobazam 10mg once daily    Diagnosis: Hyperlipidemia  Assessment and Plan of Treatment: Continue your previous atorvastatin    Diagnosis: Urinary retention  Assessment and Plan of Treatment: Because you had less bowel movements, your bowels might have been pressing against your bladder and/or vessels leading out of bladder. We have prescribed miralax (17g twice daily - once in morning and once at evening) and senna (2 tablets at bed)    Diagnosis: Hypothyroid  Assessment and Plan of Treatment: Continue your levothroxine at your dose     PRINCIPAL DISCHARGE DIAGNOSIS  Diagnosis: Altered mental status  Assessment and Plan of Treatment: Entered with AMS, treated with abx and improvement was noted. On discharge continue VANTIN (Cefpodoxime at 200mg twice daily) on discharge for 5d (starting 7/11 and ending 7/15)      SECONDARY DISCHARGE DIAGNOSES  Diagnosis: Fever  Assessment and Plan of Treatment: You had positive blood cultures for two different bacteria, this could have caused on inflammatory state worsening your epilepsy. On discharge please continue to take cefodoximipe 200mg for 5 days, starting 7/11 and ending on 7/15    Diagnosis: Acute metabolic encephalopathy  Assessment and Plan of Treatment: You had positive blood cultures for two different bacteria, this could have caused on inflammatory state worsening your epilepsy. On discharge please continue to take cefodoximipe as stated in the fever section above    Diagnosis: History of epilepsy  Assessment and Plan of Treatment: Was placed on vEEG - alterned mental status thought to be secondary to toxic metabolic encephalapathy due to a possible infection. On discharge please continue to take your Xcopri 250mg once daily and Clobazam 10mg once daily    Diagnosis: Hyperlipidemia  Assessment and Plan of Treatment: Continue your previous atorvastatin    Diagnosis: Urinary retention  Assessment and Plan of Treatment: Because you had less bowel movements, your bowels might have been pressing against your bladder and/or vessels leading out of bladder. We have prescribed miralax (17g twice daily - once in morning and once at evening) and senna (2 tablets at bed)    Diagnosis: Hypothyroid  Assessment and Plan of Treatment: Continue your levothroxine at your dose

## 2025-07-07 NOTE — PROGRESS NOTE ADULT - PROBLEM SELECTOR PLAN 1
Pt presenting with 1 day of increased somnolence and found to be febrile by HHA. Baseline A&Ox1-2 from previous charts. HHA not present upon arrival to [facility]  CTH negative  On exam pt follows commands, but further ROS limited 2/2 baseline mental status.    -VEEG monitoring  -R/O reversible causes, F/U B12/folate, RPR. Check clobazam and cenobamate level  -No records per [facility], however Dr. Belcher recently prescribed AEDs, further collateral in AM including med rec  -Epilepsy consult Pt p/w 1 day of somnolence and declining AMS from baseline, according to A.  CT head negative.   On exam pt follows commands, but further ROS limited 2/2 baseline mental status.    -VEEG monitoring  -epilepsy consult  -R/O reversible causes, F/U B12/folate, RPR. Check clobazam and cenobamate level  -Gather med rec from [facility]  -No records per [facility], however Dr. Rowell recently prescribed AEDs Pt p/w 1 day of somnolence and declining AMS from baseline, according to HHA. On exam pt follows commands, but further ROS limited 2/2 baseline mental status.    -UA in ED negative  -CT head r/o acute intracranial causes including hydrocephalus, mass effect, or acute intracranial hemorrhage  -VEEG monitoring-collected  -epilepsy team consulted  -R/O reversible causes, F/U B12/folate, RPR.   -Clobazam and cenobamate level, Utox-pending  -BCx   -Gather med rec from [facility]  -No records per [facility], however Dr. Rowell recently prescribed AEDs

## 2025-07-07 NOTE — H&P ADULT - PROBLEM SELECTOR PLAN 4
Per [facility] on crestor 5mg qhs    -C/W home med [facility] on levothyroxine 88mcg qd  TSH 0.856    -C/W home med

## 2025-07-07 NOTE — H&P ADULT - NSHPPHYSICALEXAM_GEN_ALL_CORE
.  VITAL SIGNS:  T(C): 36.8 (07-07-25 @ 03:04), Max: 38.8 (07-06-25 @ 17:18)  T(F): 98.2 (07-07-25 @ 03:04), Max: 101.8 (07-06-25 @ 17:18)  HR: 67 (07-07-25 @ 03:04) (62 - 90)  BP: 116/59 (07-07-25 @ 03:04) (110/72 - 146/83)  BP(mean): --  RR: 18 (07-07-25 @ 03:04) (15 - 18)  SpO2: 94% (07-07-25 @ 03:04) (90% - 98%)  Wt(kg): --    PHYSICAL EXAM:    Constitutional: Resting comfortably in bed; NAD  Head: NC/AT  Eyes: Pupils dilated to 5-6mm B/L but reactive to light  ENT: MMM  Neck: supple  Respiratory: CTA B/L  Cardiac: RRR  Gastrointestinal: Distended, soft, non tender  Extremities: RLE with IV, cool distally to IV. LLE warm  Musculoskeletal: NROM x4  Vascular: 2+  DP/PT pulses B/L  Neurologic: AAOx1; follows simple commands, no focal deficits

## 2025-07-07 NOTE — CONSULT NOTE ADULT - SUBJECTIVE AND OBJECTIVE BOX
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 Per home health aide in ED patient was also febrile.  Normally more alert however patient very somnolent, she only responsive to tactile stimuli and falls back asleep. Upon my evaluation no HHA or family present. Pt awake and responding to questions/following commands but A&Ox1. Patient endorses compliance with her home medications although unable to list the names of said medications. She acknowledges questions but unable to fully comprehend and participate fully in the exam.    In the ED  Vitals: T 101.8 rectal, HR 75, /66, RR 16 O2 98% on 2L NC  Labs: WBC 7.58, Hb 14, Plts 182, Cr 0.58, UA negative, RVP negative, Lactate 1.9, TSH 0.856  Imaging: CTH No evidence of acute transcortical infarct, acute intracranial   hemorrhage, or mass effect.  CTA chest and CT A/P:  Limited evaluation of the segmental and subsegmental pulmonary arteries in the lower right lung due to motion artifact. Within this limitation, no pulmonary embolism is visualized. Extensive soft tissue swelling throughout the right shoulder region, visualized right upper extremity, and anterior upper right chest wall. This has increased since the CT of the abdomen and pelvis from 6/26/2025 when there was contrast extravasation. Follow-up as clinically warranted. No acute intra-abdominal findings.  Interventions: Ofirmev x1, Levaquin 750mg x1 (PCN allergy), NS 2400cc   (07 Jul 2025 04:12)     MEDICATIONS  Home Medications:  ATORVASTATIN  10MG TABS: TAKE 1 TABLET BY MOUTH DAILY (07 Jul 2025 12:57)  cloBAZam 10 mg oral tablet: 1 tab(s) orally once a day in AM (07 Jul 2025 04:17)  cloBAZam 5 mg oral tablet: 3 tab(s) orally once a day in PM (07 Jul 2025 04:17)  Crestor 5 mg oral tablet: 1 tab(s) orally once a day (07 Jul 2025 04:17)  FAMOTIDINE 10 MG ORAL TABLET: TAKE 1 TABLET (10 MG) BY MOUTH EVERY 12 HOURS AS NEEDED (07 Jul 2025 12:38)  FOLIC ACID   TAB 1MG:  (07 Jul 2025 12:36)  LEVOTHYROXIN TAB 88MCG:  (07 Jul 2025 12:36)  METHENAM HIP TAB 1GM:  (07 Jul 2025 12:30)  methenamine hippurate 1 g oral tablet: 1 tab(s) orally 2 times a day (07 Jul 2025 12:33)  MULTIVITAMIN  TABLET   NYMCD: TAKE 1 TABLET  BY MOUTH DAILY (07 Jul 2025 12:36)  SENNA 8.6 MG ORAL TABLET  2024: TAKE 2 TABLETS (17.2 MG) BY MOUTH TWICE DAILY (07 Jul 2025 12:38)  thiamine 100 mg oral tablet: 1 tab(s) orally once a day (07 Jul 2025 04:17)  Vitamin B-12 1000 mcg oral tablet: 1 tab(s) orally once a day (07 Jul 2025 04:17)  VITAMIN B-12 1000 MCG TABS  22: TAKE 1 TABLET BY MOUTH EVERY OTHER DAY (07 Jul 2025 12:38)  XCOPRI       TAB 200MG:  (07 Jul 2025 12:36)  XCOPRI       TAB 50MG:  (07 Jul 2025 12:38)  Xcopri 200 mg oral tablet: 1 tab(s) orally once a day in the morning (07 Jul 2025 04:17)  Xcopri 50 mg oral tablet: 1 tab(s) orally once a day in the evening (07 Jul 2025 04:17)    MEDICATIONS  (STANDING):  artificial tears (preservative free) Ophthalmic Solution 1 Drop(s) Both EYES daily  bisacodyl 5 milliGRAM(s) Oral at bedtime  cenobamate 200 milliGRAM(s) Oral every 24 hours  cloBAZam 10 milliGRAM(s) Oral every 24 hours  enoxaparin Injectable 40 milliGRAM(s) SubCutaneous every 24 hours  famotidine    Tablet 20 milliGRAM(s) Oral daily  folic acid 1 milliGRAM(s) Oral daily  levothyroxine 88 MICROGram(s) Oral every 24 hours  polyethylene glycol 3350 17 Gram(s) Oral every 12 hours  rosuvastatin 5 milliGRAM(s) Oral at bedtime  senna 2 Tablet(s) Oral at bedtime  thiamine 100 milliGRAM(s) Oral every 24 hours    MEDICATIONS  (PRN):      FAMILY HISTORY:    SOCIAL HISTORY: negative for tobacco, alcohol, or ilicit drug use.    Allergies    penicillin (Anaphylaxis)  aspirin (Other)    Intolerances    GEN: lying in bed, able to follow simple commands such as opening eyes and squeezing fingers    NEURO:   MENTAL STATUS: AAOx1 to name only; mumbles when asked about location and year  LANG/SPEECH: Aphasic on exam, mumbles frequently  CRANIAL NERVES:  Eyes: Pupils dilated but equal and reactive, unable to test visual fields given non-compliance; EOM intact, tracks around the room  Face: facial asymmetry with decreased nasolabial folds on the left side  ENT: normal hearing to speech; limited exam due to comprehension deficits  MOTOR: intact grasp strength but unable to raise arms for strength testing; b/l foot drop  REFLEXES: 2/4 in b/l LEs, brisk reflexes in b/l UEs  SENSORY: Normal to touch, temperature in all extremities; vibration reduced in L ankle  COORD: unable to test due to patient mentation  Gait: Deferred    LABS:                        12.8   5.02  )-----------( 162      ( 07 Jul 2025 12:42 )             40.5     07-07    139  |  107  |  10  ----------------------------<  97  4.0   |  21[L]  |  0.35[L]    Ca    9.0      07 Jul 2025 12:42  Mg     1.7     07-07    TPro  6.2  /  Alb  3.1[L]  /  TBili  0.2  /  DBili  x   /  AST  15  /  ALT  7[L]  /  AlkPhos  120  07-07    Hemoglobin A1C:   Vitamin B12   PT/INR - ( 06 Jul 2025 17:13 )   PT: 12.1 sec;   INR: 1.04          PTT - ( 06 Jul 2025 17:13 )  PTT:33.7 sec  CAPILLARY BLOOD GLUCOSE    Urinalysis Basic - ( 07 Jul 2025 12:42 )    Color: x / Appearance: x / SG: x / pH: x  Gluc: 97 mg/dL / Ketone: x  / Bili: x / Urobili: x   Blood: x / Protein: x / Nitrite: x   Leuk Esterase: x / RBC: x / WBC x   Sq Epi: x / Non Sq Epi: x / Bacteria: x    Microbiology:    Urinalysis with Rflx Culture (collected 06 Jul 2025 17:13)    Culture - Blood (collected 06 Jul 2025 17:13)  Source: Blood Blood-Peripheral  Gram Stain (07 Jul 2025 13:28):    Growth in aerobic bottle: Gram Positive Cocci in Clusters  Preliminary Report (07 Jul 2025 13:28):    Growth in aerobic bottle: Gram Positive Cocci in Clusters    Direct identification is available within approximately 3-5    hours either by Blood Panel Multiplexed PCR or Direct    MALDI-TOF. Details: [email or web address]    Culture - Blood (collected 06 Jul 2025 17:13)  Source: Blood Blood-Peripheral  Gram Stain (07 Jul 2025 13:53):    Growth in aerobic bottle: Gram Positive Cocci in Clusters  Preliminary Report (07 Jul 2025 13:54):    Growth in aerobic bottle: Gram Positive Cocci in Clusters        RADIOLOGY, EKG AND ADDITIONAL TESTS: Reviewed.           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 Epilepsy consulted for guidance iso AMS and established medical history of epilepsy. In the ED, vitals significant for T 101.8 rectal. CT Head  with no evidence of acute transcortical infarct, acute intracranial hemorrhage, or mass effect.  Patient seen and examined at bedside via  ID [id number], somenolent appearing and unable to provide a thorough history. Patient only able to verbalize that she feels fine, has had epilepsy for years with no episode in "a long time." Patient appeared more awake and conversational as the exam went on, but she was still AOx1 to person only. Per chart review, patient has been AOx1-2 on prior admissions with last admission for seizures in 2022; her epileptologist is Dr. Sheila Jeter. Given no HHA at bedside, unclear history regarding current medication regimen. Per surescripts, patient take Xcopri 200mg and 50mg qd, and Clobazam 10mg qd.    MEDICATIONS  Home Medications:  ATORVASTATIN  10MG TABS: TAKE 1 TABLET BY MOUTH DAILY (07 Jul 2025 12:57)  cloBAZam 10 mg oral tablet: 1 tab(s) orally once a day in AM (07 Jul 2025 04:17)  cloBAZam 5 mg oral tablet: 3 tab(s) orally once a day in PM (07 Jul 2025 04:17)  Crestor 5 mg oral tablet: 1 tab(s) orally once a day (07 Jul 2025 04:17)  FAMOTIDINE 10 MG ORAL TABLET: TAKE 1 TABLET (10 MG) BY MOUTH EVERY 12 HOURS AS NEEDED (07 Jul 2025 12:38)  FOLIC ACID   TAB 1MG:  (07 Jul 2025 12:36)  LEVOTHYROXIN TAB 88MCG:  (07 Jul 2025 12:36)  METHENAM HIP TAB 1GM:  (07 Jul 2025 12:30)  methenamine hippurate 1 g oral tablet: 1 tab(s) orally 2 times a day (07 Jul 2025 12:33)  MULTIVITAMIN  TABLET   NYMCD: TAKE 1 TABLET  BY MOUTH DAILY (07 Jul 2025 12:36)  SENNA 8.6 MG ORAL TABLET  2024: TAKE 2 TABLETS (17.2 MG) BY MOUTH TWICE DAILY (07 Jul 2025 12:38)  thiamine 100 mg oral tablet: 1 tab(s) orally once a day (07 Jul 2025 04:17)  Vitamin B-12 1000 mcg oral tablet: 1 tab(s) orally once a day (07 Jul 2025 04:17)  VITAMIN B-12 1000 MCG TABS  22: TAKE 1 TABLET BY MOUTH EVERY OTHER DAY (07 Jul 2025 12:38)  XCOPRI       TAB 200MG:  (07 Jul 2025 12:36)  XCOPRI       TAB 50MG:  (07 Jul 2025 12:38)  Xcopri 200 mg oral tablet: 1 tab(s) orally once a day in the morning (07 Jul 2025 04:17)  Xcopri 50 mg oral tablet: 1 tab(s) orally once a day in the evening (07 Jul 2025 04:17)    MEDICATIONS  (STANDING):  artificial tears (preservative free) Ophthalmic Solution 1 Drop(s) Both EYES daily  bisacodyl 5 milliGRAM(s) Oral at bedtime  cenobamate 200 milliGRAM(s) Oral every 24 hours  cloBAZam 10 milliGRAM(s) Oral every 24 hours  enoxaparin Injectable 40 milliGRAM(s) SubCutaneous every 24 hours  famotidine    Tablet 20 milliGRAM(s) Oral daily  folic acid 1 milliGRAM(s) Oral daily  levothyroxine 88 MICROGram(s) Oral every 24 hours  polyethylene glycol 3350 17 Gram(s) Oral every 12 hours  rosuvastatin 5 milliGRAM(s) Oral at bedtime  senna 2 Tablet(s) Oral at bedtime  thiamine 100 milliGRAM(s) Oral every 24 hours    MEDICATIONS  (PRN):    FAMILY HISTORY:    SOCIAL HISTORY: negative for tobacco, alcohol, or ilicit drug use.    Allergies    penicillin (Anaphylaxis)  aspirin (Other)    Intolerances    GEN: lying in bed, able to follow simple commands such as opening eyes and squeezing fingers    NEURO:   MENTAL STATUS: Oriented to name only, in and out of alertness; mumbles when asked about location and year  LANG/SPEECH: Limited verbal output but able to answer some questions, speech sounded slurred intermittently; somnolent overall  CRANIAL NERVES:  Eyes: Pupils dilated but equal and reactive, unable to test visual fields given non-compliance; EOM intact, tracks around the room  Face: decreased nasolabial folds on the left side  ENT: normal hearing to speech; limited exam due to comprehension deficits  MOTOR: intact grasp strength; unable to perform strength testing; b/l foot drop  REFLEXES: 1+ in b/l LEs, 2+ reflexes in b/l UEs  SENSORY: Normal to touch, temperature in all extremities; vibration reduced in L ankle  COORD: unable to test due to patient mentation  Gait: Deferred    LABS:                        12.8   5.02  )-----------( 162      ( 07 Jul 2025 12:42 )             40.5     07-07    139  |  107  |  10  ----------------------------<  97  4.0   |  21[L]  |  0.35[L]    Ca    9.0      07 Jul 2025 12:42  Mg     1.7     07-07    TPro  6.2  /  Alb  3.1[L]  /  TBili  0.2  /  DBili  x   /  AST  15  /  ALT  7[L]  /  AlkPhos  120  07-07    Hemoglobin A1C:   Vitamin B12   PT/INR - ( 06 Jul 2025 17:13 )   PT: 12.1 sec;   INR: 1.04       PTT - ( 06 Jul 2025 17:13 )  PTT:33.7 sec  CAPILLARY BLOOD GLUCOSE    Urinalysis Basic - ( 07 Jul 2025 12:42 )    Color: x / Appearance: x / SG: x / pH: x  Gluc: 97 mg/dL / Ketone: x  / Bili: x / Urobili: x   Blood: x / Protein: x / Nitrite: x   Leuk Esterase: x / RBC: x / WBC x   Sq Epi: x / Non Sq Epi: x / Bacteria: x    Microbiology:    Urinalysis with Rflx Culture (collected 06 Jul 2025 17:13)    Culture - Blood (collected 06 Jul 2025 17:13)  Source: Blood Blood-Peripheral  Gram Stain (07 Jul 2025 13:28):    Growth in aerobic bottle: Gram Positive Cocci in Clusters  Preliminary Report (07 Jul 2025 13:28):    Growth in aerobic bottle: Gram Positive Cocci in Clusters    Direct identification is available within approximately 3-5    hours either by Blood Panel Multiplexed PCR or Direct    MALDI-TOF. Details: [email or web address]    Culture - Blood (collected 06 Jul 2025 17:13)  Source: Blood Blood-Peripheral  Gram Stain (07 Jul 2025 13:53):    Growth in aerobic bottle: Gram Positive Cocci in Clusters  Preliminary Report (07 Jul 2025 13:54):    Growth in aerobic bottle: Gram Positive Cocci in Clusters    RADIOLOGY, EKG AND ADDITIONAL TESTS: Reviewed.           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 Epilepsy consulted for guidance iso AMS and established medical history of epilepsy. In the ED, vitals significant for T 101.8 rectal. CT Head  with no evidence of acute transcortical infarct, acute intracranial hemorrhage, or mass effect.  Patient seen and examined at bedside via  ID [id number], somenolent appearing and unable to provide a thorough history. Patient only able to verbalize that she feels fine, has had epilepsy for years with no episode in "a long time." Patient appeared more awake and conversational as the exam went on, but she was still AOx1 to person only. Per chart review, patient has been AOx1-2 on prior admissions with last admission for seizures in 2022; her epileptologist is Dr. Sheila Jeter. Given no HHA at bedside, unclear history regarding current medication regimen. Per surescripts, patient take Xcopri 200mg and 50mg qd, and Clobazam 10mg qd.    MEDICATIONS  Home Medications:  ATORVASTATIN  10MG TABS: TAKE 1 TABLET BY MOUTH DAILY (07 Jul 2025 12:57)  cloBAZam 10 mg oral tablet: 1 tab(s) orally once a day in AM (07 Jul 2025 04:17)  cloBAZam 5 mg oral tablet: 3 tab(s) orally once a day in PM (07 Jul 2025 04:17)  Crestor 5 mg oral tablet: 1 tab(s) orally once a day (07 Jul 2025 04:17)  FAMOTIDINE 10 MG ORAL TABLET: TAKE 1 TABLET (10 MG) BY MOUTH EVERY 12 HOURS AS NEEDED (07 Jul 2025 12:38)  FOLIC ACID   TAB 1MG:  (07 Jul 2025 12:36)  LEVOTHYROXIN TAB 88MCG:  (07 Jul 2025 12:36)  METHENAM HIP TAB 1GM:  (07 Jul 2025 12:30)  methenamine hippurate 1 g oral tablet: 1 tab(s) orally 2 times a day (07 Jul 2025 12:33)  MULTIVITAMIN  TABLET   NYMCD: TAKE 1 TABLET  BY MOUTH DAILY (07 Jul 2025 12:36)  SENNA 8.6 MG ORAL TABLET  2024: TAKE 2 TABLETS (17.2 MG) BY MOUTH TWICE DAILY (07 Jul 2025 12:38)  thiamine 100 mg oral tablet: 1 tab(s) orally once a day (07 Jul 2025 04:17)  Vitamin B-12 1000 mcg oral tablet: 1 tab(s) orally once a day (07 Jul 2025 04:17)  VITAMIN B-12 1000 MCG TABS  22: TAKE 1 TABLET BY MOUTH EVERY OTHER DAY (07 Jul 2025 12:38)  XCOPRI       TAB 200MG:  (07 Jul 2025 12:36)  XCOPRI       TAB 50MG:  (07 Jul 2025 12:38)  Xcopri 200 mg oral tablet: 1 tab(s) orally once a day in the morning (07 Jul 2025 04:17)  Xcopri 50 mg oral tablet: 1 tab(s) orally once a day in the evening (07 Jul 2025 04:17)    MEDICATIONS  (STANDING):  artificial tears (preservative free) Ophthalmic Solution 1 Drop(s) Both EYES daily  bisacodyl 5 milliGRAM(s) Oral at bedtime  cenobamate 200 milliGRAM(s) Oral every 24 hours  cloBAZam 10 milliGRAM(s) Oral every 24 hours  enoxaparin Injectable 40 milliGRAM(s) SubCutaneous every 24 hours  famotidine    Tablet 20 milliGRAM(s) Oral daily  folic acid 1 milliGRAM(s) Oral daily  levothyroxine 88 MICROGram(s) Oral every 24 hours  polyethylene glycol 3350 17 Gram(s) Oral every 12 hours  rosuvastatin 5 milliGRAM(s) Oral at bedtime  senna 2 Tablet(s) Oral at bedtime  thiamine 100 milliGRAM(s) Oral every 24 hours    MEDICATIONS  (PRN):    FAMILY HISTORY:    SOCIAL HISTORY: negative for tobacco, alcohol, or ilicit drug use.    Allergies    penicillin (Anaphylaxis)  aspirin (Other)    Intolerances    GEN: lying in bed, able to follow simple commands such as opening eyes and squeezing fingers    NEURO:   MENTAL STATUS: Oriented to name only, in and out of alertness; mumbles when asked about location and year  LANG/SPEECH: Limited verbal output but able to answer some questions, speech sounded slurred intermittently; lethargic overall  CRANIAL NERVES:  Eyes: Pupils dilated but equal and reactive, unable to test visual fields given since Pt unable to comprehend; EOM intact, tracks around the room  Face: decreased nasolabial folds on the left side  ENT: normal hearing to speech; limited exam due to comprehension deficits  MOTOR: intact grasp strength; unable to perform strength testing; b/l foot drop  REFLEXES: 1+ in b/l LEs, 2+ reflexes in b/l UEs  SENSORY: Normal to touch, temperature in all extremities; vibration reduced in L ankle  COORD: unable to assess  Gait: Deferred    LABS:                        12.8   5.02  )-----------( 162      ( 07 Jul 2025 12:42 )             40.5     07-07    139  |  107  |  10  ----------------------------<  97  4.0   |  21[L]  |  0.35[L]    Ca    9.0      07 Jul 2025 12:42  Mg     1.7     07-07    TPro  6.2  /  Alb  3.1[L]  /  TBili  0.2  /  DBili  x   /  AST  15  /  ALT  7[L]  /  AlkPhos  120  07-07    Hemoglobin A1C:   Vitamin B12   PT/INR - ( 06 Jul 2025 17:13 )   PT: 12.1 sec;   INR: 1.04       PTT - ( 06 Jul 2025 17:13 )  PTT:33.7 sec  CAPILLARY BLOOD GLUCOSE    Urinalysis Basic - ( 07 Jul 2025 12:42 )    Color: x / Appearance: x / SG: x / pH: x  Gluc: 97 mg/dL / Ketone: x  / Bili: x / Urobili: x   Blood: x / Protein: x / Nitrite: x   Leuk Esterase: x / RBC: x / WBC x   Sq Epi: x / Non Sq Epi: x / Bacteria: x    Microbiology:    Urinalysis with Rflx Culture (collected 06 Jul 2025 17:13)    Culture - Blood (collected 06 Jul 2025 17:13)  Source: Blood Blood-Peripheral  Gram Stain (07 Jul 2025 13:28):    Growth in aerobic bottle: Gram Positive Cocci in Clusters  Preliminary Report (07 Jul 2025 13:28):    Growth in aerobic bottle: Gram Positive Cocci in Clusters    Direct identification is available within approximately 3-5    hours either by Blood Panel Multiplexed PCR or Direct    MALDI-TOF. Details: [email or web address]    Culture - Blood (collected 06 Jul 2025 17:13)  Source: Blood Blood-Peripheral  Gram Stain (07 Jul 2025 13:53):    Growth in aerobic bottle: Gram Positive Cocci in Clusters  Preliminary Report (07 Jul 2025 13:54):    Growth in aerobic bottle: Gram Positive Cocci in Clusters    RADIOLOGY, EKG AND ADDITIONAL TESTS: Reviewed.

## 2025-07-07 NOTE — H&P ADULT - PROBLEM SELECTOR PLAN 2
Last seen for AMS 1/22  Home meds per sure scripts: Clobazam 10mg in AM, clobazam 15mg in PM, Xcopri 200mg in AM, Xcopri 50mg qhs, mirtazapine 15mg qhs  HHA not present at bedside to confirm medications    -Resume AM medications  -Confirm med rec in AM  -F/U levels as above  - B12 and folate supplementation In ED met 1/4 SIRS criteria (fever)  WBC of 7.5, UA negative, RVP negative, CTH negative  CTA with no PE. Extensive soft tissue swelling throughout the right shoulder region, visualized right upper extremity, and anterior upper right chest wall. This has increased since the CT of the abdomen pelvis from 6/26/2025. Several gas foci along the medial aspect of the upper arm. Gas foci within the right medial arm were also visualized on prior CT. No organized fluid collection is imaged.  On exam: RUE without signs of infections, appears to be IV insertion attempt to R arm. No crepitus, erythema, induration noted. NVI in distal hand. No neck stiffness/photophobia. Pupils 5-6mm B/L and reactive    -Continue off antibiotics at this point, pt remains HDS without clear source of active infection at this point. No concern for meningitis currently as above  -RUE US to evaluate for gas noted on CT  -CTM fever curve, F/U bcx

## 2025-07-07 NOTE — DISCHARGE NOTE PROVIDER - NSDCMRMEDTOKEN_GEN_ALL_CORE_FT
cloBAZam 10 mg oral tablet: 1 tab(s) orally once a day in AM  cloBAZam 5 mg oral tablet: 3 tab(s) orally once a day in PM  Crestor 5 mg oral tablet: 1 tab(s) orally once a day  MiraLax oral powder for reconstitution: 17 gram(s) orally every 24 hours  Senna 8.6 mg oral tablet: 1 tab(s) orally once a day (at bedtime)  thiamine 100 mg oral tablet: 1 tab(s) orally once a day  Vitamin B-12 1000 mcg oral tablet: 1 tab(s) orally once a day  Xcopri 200 mg oral tablet: 1 tab(s) orally once a day in the morning  Xcopri 50 mg oral tablet: 1 tab(s) orally once a day in the evening   ATORVASTATIN  10MG TABS: TAKE 1 TABLET BY MOUTH DAILY  cloBAZam 10 mg oral tablet: 1 tab(s) orally once a day in AM  cloBAZam 5 mg oral tablet: 3 tab(s) orally once a day in PM  Crestor 5 mg oral tablet: 1 tab(s) orally once a day  FAMOTIDINE 10 MG ORAL TABLET: TAKE 1 TABLET (10 MG) BY MOUTH EVERY 12 HOURS AS NEEDED  FOLIC ACID   TAB 1MG:   LEVOTHYROXIN TAB 88MCG:   METHENAM HIP TAB 1GM:   methenamine hippurate 1 g oral tablet: 1 tab(s) orally 2 times a day  MiraLax oral powder for reconstitution: 17 gram(s) orally every 24 hours  MULTIVITAMIN  TABLET   NYMCD: TAKE 1 TABLET  BY MOUTH DAILY  Senna 8.6 mg oral tablet: 1 tab(s) orally once a day (at bedtime)  SENNA 8.6 MG ORAL TABLET  2024: TAKE 2 TABLETS (17.2 MG) BY MOUTH TWICE DAILY  thiamine 100 mg oral tablet: 1 tab(s) orally once a day  Vitamin B-12 1000 mcg oral tablet: 1 tab(s) orally once a day  VITAMIN B-12 1000 MCG TABS  22: TAKE 1 TABLET BY MOUTH EVERY OTHER DAY  XCOPRI       TAB 200MG:   XCOPRI       TAB 50MG:   Xcopri 200 mg oral tablet: 1 tab(s) orally once a day in the morning  Xcopri 50 mg oral tablet: 1 tab(s) orally once a day in the evening   ATORVASTATIN  10MG TABS: TAKE 1 TABLET BY MOUTH DAILY  cefpodoxime 200 mg oral tablet: 1 tab(s) orally every 12 hours  cefpodoxime 200 mg oral tablet: 1 tab(s) orally 2 times a day  cenobamate 200 mg oral tablet: 1 tab(s) orally every 24 hours  cenobamate 50 mg oral tablet: 1 tab(s) orally once a day (at bedtime)  cloBAZam 10 mg oral tablet: 1 tab(s) orally once a day in AM  FAMOTIDINE 10 MG ORAL TABLET: TAKE 1 TABLET (10 MG) BY MOUTH EVERY 12 HOURS AS NEEDED  FOLIC ACID   TAB 1MG:   LEVOTHYROXIN TAB 88MCG:   METHENAM HIP TAB 1GM:   methenamine hippurate 1 g oral tablet: 1 tab(s) orally 2 times a day  MiraLax oral powder for reconstitution: 17 gram(s) orally every 24 hours  MULTIVITAMIN  TABLET   NYMCD: TAKE 1 TABLET  BY MOUTH DAILY  polyethylene glycol 3350 oral powder for reconstitution: 17 gram(s) orally once a day take if constipated, do not take if having diarrhea  senna (sennosides) 15 mg oral tablet: 2 tab(s) orally once a day take if constipated, do not take if having diarrhea .  Senna 8.6 mg oral tablet: 1 tab(s) orally once a day (at bedtime)  thiamine 100 mg oral tablet: 1 tab(s) orally once a day  Vitamin B-12 1000 mcg oral tablet: 1 tab(s) orally once a day   cefpodoxime 200 mg oral tablet: 1 tab(s) orally every 12 hours  cefpodoxime 200 mg oral tablet: 1 tab(s) orally 2 times a day  cenobamate 200 mg oral tablet: 1 tab(s) orally every 24 hours  cenobamate 50 mg oral tablet: 1 tab(s) orally once a day (at bedtime)  cloBAZam 10 mg oral tablet: 1 tab(s) orally once a day in AM  famotidine 20 mg oral tablet: 1 tab(s) orally once a day  folic acid 1 mg oral tablet: 1 tab(s) orally once a day  levothyroxine 88 mcg (0.088 mg) oral tablet: 1 tab(s) orally every 24 hours  Lipitor 10 mg oral tablet: 1 tab(s) orally once a day  methenamine hippurate 1 g oral tablet: 1 tab(s) orally 2 times a day  MiraLax oral powder for reconstitution: 17 gram(s) orally every 24 hours  Multiple Vitamins with Minerals oral tablet: 1 tab(s) orally once a day  polyethylene glycol 3350 oral powder for reconstitution: 17 gram(s) orally once a day take if constipated, do not take if having diarrhea  senna (sennosides) 15 mg oral tablet: 2 tab(s) orally once a day take if constipated, do not take if having diarrhea .  Senna 8.6 mg oral tablet: 1 tab(s) orally once a day (at bedtime)  thiamine 100 mg oral tablet: 1 tab(s) orally once a day  Vitamin B-12 1000 mcg oral tablet: 1 tab(s) orally once a day

## 2025-07-07 NOTE — H&P ADULT - NSHPADDITIONALINFOADULT_GEN_ALL_CORE
CT: IMPRESSION:  1.  Limited evaluation of the segmental and subsegmental pulmonary   arteries in the lower right lung due to motion artifact. Within this   limitation, no pulmonary embolism is visualized.  2.  Extensive soft tissue swelling throughout the right shoulder region,   visualized right upper extremity, and anterior upper right chest wall.   This has increased since the CT of the abdomen and pelvis from 6/26/2025   when there was contrast extravasation. Follow-up as clinically warranted.  3.  No acute intra-abdominal findings.

## 2025-07-07 NOTE — PROVIDER CONTACT NOTE (CRITICAL VALUE NOTIFICATION) - ACTION/TREATMENT ORDERED:
MD Parish made aware. No interventions ordered at this time.
MD Noonan made aware. No interventions ordered at this time.

## 2025-07-07 NOTE — PROGRESS NOTE ADULT - PROBLEM SELECTOR PLAN 2
In ED met 1/4 SIRS criteria (fever)  WBC of 7.5, UA negative, RVP negative, CTH negative  CTA with no PE. Extensive soft tissue swelling throughout the right shoulder region, visualized right upper extremity, and anterior upper right chest wall. This has increased since the CT of the abdomen pelvis from 6/26/2025. Several gas foci along the medial aspect of the upper arm. Gas foci within the right medial arm were also visualized on prior CT. No organized fluid collection is imaged.  On exam: RUE without signs of infections, appears to be IV insertion attempt to R arm. No crepitus, erythema, induration noted. NVI in distal hand. No neck stiffness/photophobia. Pupils 5-6mm B/L and reactive    -Continue off antibiotics at this point, pt remains HDS without clear source of active infection at this point. No concern for meningitis currently as above  -RUE US to evaluate for gas noted on CT  -CTM fever curve, F/U bcx In ED met 1/4 SIRS criteria (fever)  WBC of 7.5, UA negative, RVP negative, CTH negative  CTA with no PE. Extensive soft tissue swelling throughout the right shoulder region, visualized right upper extremity, and anterior upper right chest wall. This has increased since the CT of the abdomen pelvis from 6/26/2025. Several gas foci along the medial aspect of the upper arm. Gas foci within the right medial arm were also visualized on prior CT. No organized fluid collection is imaged.  On exam: RUE without signs of infections, appears to be IV insertion attempt to R arm. No crepitus, erythema, induration noted. NVI in distal hand. No neck stiffness/photophobia. Pupils 5-6mm B/L and reactive    -d/c abx  -RUE US  -CTM fever curve,   -f/u BCx      -Continue off antibiotics at this point, pt remains HDS without clear source of active infection at this point. No concern for meningitis currently as above  -RUE US to evaluate for gas noted on CT  -CTM fever curve, F/U bcx In ED met 1/4 SIRS criteria (fever)  WBC of 7.5, UA negative, RVP negative, CTH negative  CTA with no PE. Extensive soft tissue swelling throughout the right shoulder region, visualized right upper extremity, and anterior upper right chest wall. This has increased since the CT of the abdomen pelvis from 6/26/2025. Several gas foci along the medial aspect of the upper arm. Gas foci within the right medial arm were also visualized on prior CT. No organized fluid collection is imaged.  On exam: RUE without signs of infections, appears to be IV insertion attempt to R arm. No crepitus, erythema, induration noted. NVI in distal hand. No neck stiffness/photophobia. Pupils 5-6mm B/L and reactive    -d/c abx  -RUE US  -CTM fever curve  -Monitor for bladder distension  -Bladder scan Q6h  -f/u BCx In ED met 1/4 SIRS criteria (fever)  WBC of 7.5, UA negative, RVP negative, CTH negative  CTA with no PE. Extensive soft tissue swelling throughout the right shoulder region, visualized right upper extremity, and anterior upper right chest wall. This has increased since the CT of the abdomen pelvis from 6/26/2025. Several gas foci along the medial aspect of the upper arm. Gas foci within the right medial arm were also visualized on prior CT. No organized fluid collection is imaged.  On exam: RUE without signs of infections, appears to be IV insertion attempt to R arm. No crepitus, erythema, induration noted. NVI in distal hand. No neck stiffness/photophobia. Pupils 5-6mm B/L and reactive    -d/c abx  -RUE US  -CTM fever curve  -Monitor for bladder distension  -Bladder scan [id number] cath if residual >330cc and patient does not urinate within 1 hr of B/S; consider Vaz if requires straight cath 3 or more times  -f/u BCx

## 2025-07-07 NOTE — PROVIDER CONTACT NOTE (CRITICAL VALUE NOTIFICATION) - NS PROVIDER READ BACK TO LAB
Blood culture 7/6/25 - preliminary results growth in aerobic and anaerobic bottles for gram positive cocci in cluster/yes
6/6/25 preliminary results for blood culture (2 bottles) growth in aerobic bottle for gram positive cocci in clusters/yes

## 2025-07-07 NOTE — PROVIDER CONTACT NOTE (CRITICAL VALUE NOTIFICATION) - NS PROVIDER READ BACK
6/6/25 preliminary results for blood culture (2 bottles) growth in aerobic bottle for gram positive cocci in clusters/yes
Blood culture 7/6/25 - preliminary results growth in aerobic and anaerobic bottles for gram positive cocci in cluster/yes

## 2025-07-07 NOTE — CONSULT NOTE ADULT - ASSESSMENT
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 Epilepsy consulted for guidance iso AMS and established medical history of epilepsy.  Currently, patient placed on vEEG and denying any recent episodes of seizure like activity.     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 Epilepsy consulted for guidance iso AMS and established medical history of epilepsy.  Currently, patient placed on vEEG and denying any recent episodes of seizure like activity.     Recommendations:    - c/w vEEG  - can continue with current regimen of Xcopri and Clobazam for now until med rec confirmed  - f/u clobazam and Xcopri levels  - please confirm home epilepsy medications given discrepancy between medications provided by [facility] vs on [facility]  - please obtain collateral from outpatient neurologist Dr. 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 Epilepsy consulted for guidance iso AMS and established medical history of epilepsy.  Currently, patient placed on vEEG and denying any recent episodes of seizure like activity.     Recommendations:    - c/w vEEG  - can continue with current regimen of Xcopri and Clobazam for now until med rec confirmed  - f/u clobazam and Xcopri levels  - please confirm home epilepsy medications given discrepancy between medications provided by [facility] vs on [facility]  - please obtain collateral from outpatient neurologist Dr. Belcher    *Case discussed with Dr. 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 Epilepsy consulted for guidance iso AMS and established medical history of epilepsy. Currently, patient placed on vEEG and denying any recent episodes of seizure like activity.     Recommendations:  - c/w vEEG  - can continue with current regimen of Xcopri and Clobazam for now until med rec confirmed  - f/u clobazam and Xcopri levels  - please confirm home epilepsy medications given discrepancy between medications provided by [facility] vs on [facility]  - please obtain collateral from outpatient neurologist Dr. Belcher    *Case discussed with Dr. Arguello

## 2025-07-07 NOTE — PROVIDER CONTACT NOTE (CRITICAL VALUE NOTIFICATION) - SITUATION
Blood culture 7/6/25 - preliminary results growth in aerobic and anaerobic bottles for gram positive cocci in cluster
6/6/25 preliminary results for blood culture (2 bottles) growth in aerobic bottle for gram positive cocci in clusters

## 2025-07-07 NOTE — PROVIDER CONTACT NOTE (CRITICAL VALUE NOTIFICATION) - TEST AND RESULT REPORTED:
6/6/25 preliminary results for blood culture (2 bottles) growth in aerobic bottle for gram positive cocci in clusters
Blood culture 7/6/25 - preliminary results growth in aerobic and anaerobic bottles for gram positive cocci in cluster

## 2025-07-07 NOTE — H&P ADULT - ATTENDING COMMENTS
plan as above   acute metabolic encephalopathy on chronic   fu epilepsy recs   fu VEEG   axo1-2 per chart review - pending collateral   fu bladder scans, SC x 1 --   fu Levels  Extensive soft tissue swelling throughout the right shoulder region: fu duplex   fevers on admission: fu bcx - monitor off of abx - will cont to monitor  bedbound: ? pending collateral plan as above   acute metabolic encephalopathy on chronic   fu epilepsy recs   fu VEEG   axo 2 per chart review - axo 2 on my exam -knows place is hospital and her name   fu bladder scans, SC x 1 -- wilson placed   fu Levels  Extensive soft tissue swelling throughout the right shoulder region: fu duplex   fevers on admission: fu bcx - monitor off of abx - will cont to monitor  bedbound: ? pending collateral

## 2025-07-07 NOTE — H&P ADULT - PROBLEM SELECTOR PLAN 6
Fluids: S/P 2400cc in ED, passed bedside dysphagia encourage PO  Electrolytes: Replete as needed  Diet: puree  DVT: Lovenox  GI: Famotidine  Dispo: [facility]  Code: Full code

## 2025-07-08 ENCOUNTER — RESULT REVIEW (OUTPATIENT)
Age: 70
End: 2025-07-08

## 2025-07-08 DIAGNOSIS — R33.9 RETENTION OF URINE, UNSPECIFIED: ICD-10-CM

## 2025-07-08 LAB
ADD ON TEST-SPECIMEN IN LAB: SIGNIFICANT CHANGE UP
ALBUMIN SERPL ELPH-MCNC: 3.1 G/DL — LOW (ref 3.3–5)
ALP SERPL-CCNC: SIGNIFICANT CHANGE UP (ref 40–120)
ALT FLD-CCNC: SIGNIFICANT CHANGE UP (ref 10–45)
ANION GAP SERPL CALC-SCNC: 10 MMOL/L — SIGNIFICANT CHANGE UP (ref 5–17)
APPEARANCE UR: CLEAR — SIGNIFICANT CHANGE UP
AST SERPL-CCNC: SIGNIFICANT CHANGE UP (ref 10–40)
BILIRUB SERPL-MCNC: 0.2 MG/DL — SIGNIFICANT CHANGE UP (ref 0.2–1.2)
BILIRUB UR-MCNC: NEGATIVE — SIGNIFICANT CHANGE UP
BUN SERPL-MCNC: 12 MG/DL — SIGNIFICANT CHANGE UP (ref 7–23)
CALCIUM SERPL-MCNC: 8.6 MG/DL — SIGNIFICANT CHANGE UP (ref 8.4–10.5)
CHLORIDE SERPL-SCNC: 103 MMOL/L — SIGNIFICANT CHANGE UP (ref 96–108)
CO2 SERPL-SCNC: 20 MMOL/L — LOW (ref 22–31)
COLOR SPEC: YELLOW — SIGNIFICANT CHANGE UP
CREAT ?TM UR-MCNC: 46 MG/DL — SIGNIFICANT CHANGE UP
CREAT SERPL-MCNC: 0.36 MG/DL — LOW (ref 0.5–1.3)
CULTURE RESULTS: ABNORMAL
DIFF PNL FLD: NEGATIVE — SIGNIFICANT CHANGE UP
EGFR: 110 ML/MIN/1.73M2 — SIGNIFICANT CHANGE UP
EGFR: 110 ML/MIN/1.73M2 — SIGNIFICANT CHANGE UP
FOLATE SERPL-MCNC: >20 NG/ML — SIGNIFICANT CHANGE UP
GLUCOSE SERPL-MCNC: 92 MG/DL — SIGNIFICANT CHANGE UP (ref 70–99)
GLUCOSE UR QL: NEGATIVE MG/DL — SIGNIFICANT CHANGE UP
KETONES UR QL: 15 MG/DL
LEUKOCYTE ESTERASE UR-ACNC: ABNORMAL
MAGNESIUM SERPL-MCNC: 1.9 MG/DL — SIGNIFICANT CHANGE UP (ref 1.6–2.6)
NITRITE UR-MCNC: NEGATIVE — SIGNIFICANT CHANGE UP
ORGANISM # SPEC MICROSCOPIC CNT: ABNORMAL
ORGANISM # SPEC MICROSCOPIC CNT: SIGNIFICANT CHANGE UP
OSMOLALITY UR: 342 MOSM/KG — SIGNIFICANT CHANGE UP (ref 300–900)
PH UR: 6 — SIGNIFICANT CHANGE UP (ref 5–8)
PHOSPHATE SERPL-MCNC: 3.3 MG/DL — SIGNIFICANT CHANGE UP (ref 2.5–4.5)
POTASSIUM SERPL-MCNC: SIGNIFICANT CHANGE UP (ref 3.5–5.3)
POTASSIUM SERPL-SCNC: SIGNIFICANT CHANGE UP (ref 3.5–5.3)
PROT ?TM UR-MCNC: 10 MG/DL — SIGNIFICANT CHANGE UP (ref 0–12)
PROT SERPL-MCNC: 6.8 G/DL — SIGNIFICANT CHANGE UP (ref 6–8.3)
PROT UR-MCNC: NEGATIVE MG/DL — SIGNIFICANT CHANGE UP
PROT/CREAT UR-RTO: 0.2 RATIO — SIGNIFICANT CHANGE UP (ref 0–0.2)
SODIUM SERPL-SCNC: 133 MMOL/L — LOW (ref 135–145)
SODIUM UR-SCNC: 48 MMOL/L — SIGNIFICANT CHANGE UP
SP GR SPEC: 1.01 — SIGNIFICANT CHANGE UP (ref 1–1.03)
SPECIMEN SOURCE: SIGNIFICANT CHANGE UP
T PALLIDUM AB TITR SER: NEGATIVE — SIGNIFICANT CHANGE UP
UROBILINOGEN FLD QL: 0.2 MG/DL — SIGNIFICANT CHANGE UP (ref 0.2–1)
UUN UR-MCNC: 377 MG/DL — SIGNIFICANT CHANGE UP
VIT B12 SERPL-MCNC: 1582 PG/ML — HIGH (ref 232–1245)

## 2025-07-08 PROCEDURE — 80339 ANTIEPILEPTICS NOS 1-3: CPT

## 2025-07-08 PROCEDURE — 0241U: CPT

## 2025-07-08 PROCEDURE — 83036 HEMOGLOBIN GLYCOSYLATED A1C: CPT

## 2025-07-08 PROCEDURE — 71275 CT ANGIOGRAPHY CHEST: CPT

## 2025-07-08 PROCEDURE — 82272 OCCULT BLD FECES 1-3 TESTS: CPT

## 2025-07-08 PROCEDURE — 86780 TREPONEMA PALLIDUM: CPT

## 2025-07-08 PROCEDURE — 83935 ASSAY OF URINE OSMOLALITY: CPT

## 2025-07-08 PROCEDURE — G0545: CPT

## 2025-07-08 PROCEDURE — 84540 ASSAY OF URINE/UREA-N: CPT

## 2025-07-08 PROCEDURE — 84443 ASSAY THYROID STIM HORMONE: CPT

## 2025-07-08 PROCEDURE — 99222 1ST HOSP IP/OBS MODERATE 55: CPT

## 2025-07-08 PROCEDURE — 85610 PROTHROMBIN TIME: CPT

## 2025-07-08 PROCEDURE — 81001 URINALYSIS AUTO W/SCOPE: CPT

## 2025-07-08 PROCEDURE — 83930 ASSAY OF BLOOD OSMOLALITY: CPT

## 2025-07-08 PROCEDURE — 99233 SBSQ HOSP IP/OBS HIGH 50: CPT

## 2025-07-08 PROCEDURE — 87150 DNA/RNA AMPLIFIED PROBE: CPT

## 2025-07-08 PROCEDURE — 82607 VITAMIN B-12: CPT

## 2025-07-08 PROCEDURE — 83880 ASSAY OF NATRIURETIC PEPTIDE: CPT

## 2025-07-08 PROCEDURE — 84100 ASSAY OF PHOSPHORUS: CPT

## 2025-07-08 PROCEDURE — 36415 COLL VENOUS BLD VENIPUNCTURE: CPT

## 2025-07-08 PROCEDURE — 84300 ASSAY OF URINE SODIUM: CPT

## 2025-07-08 PROCEDURE — 81003 URINALYSIS AUTO W/O SCOPE: CPT

## 2025-07-08 PROCEDURE — 99233 SBSQ HOSP IP/OBS HIGH 50: CPT | Mod: GC

## 2025-07-08 PROCEDURE — 84145 PROCALCITONIN (PCT): CPT

## 2025-07-08 PROCEDURE — 80299 QUANTITATIVE ASSAY DRUG: CPT

## 2025-07-08 PROCEDURE — 80048 BASIC METABOLIC PNL TOTAL CA: CPT

## 2025-07-08 PROCEDURE — 83605 ASSAY OF LACTIC ACID: CPT

## 2025-07-08 PROCEDURE — 87040 BLOOD CULTURE FOR BACTERIA: CPT

## 2025-07-08 PROCEDURE — 85025 COMPLETE CBC W/AUTO DIFF WBC: CPT

## 2025-07-08 PROCEDURE — 85730 THROMBOPLASTIN TIME PARTIAL: CPT

## 2025-07-08 PROCEDURE — 80053 COMPREHEN METABOLIC PANEL: CPT

## 2025-07-08 PROCEDURE — 84156 ASSAY OF PROTEIN URINE: CPT

## 2025-07-08 PROCEDURE — 74177 CT ABD & PELVIS W/CONTRAST: CPT

## 2025-07-08 PROCEDURE — 70450 CT HEAD/BRAIN W/O DYE: CPT

## 2025-07-08 PROCEDURE — 82746 ASSAY OF FOLIC ACID SERUM: CPT

## 2025-07-08 PROCEDURE — 95720 EEG PHY/QHP EA INCR W/VEEG: CPT

## 2025-07-08 PROCEDURE — 80061 LIPID PANEL: CPT

## 2025-07-08 PROCEDURE — 82570 ASSAY OF URINE CREATININE: CPT

## 2025-07-08 PROCEDURE — 76882 US LMTD JT/FCL EVL NVASC XTR: CPT | Mod: 26,RT

## 2025-07-08 PROCEDURE — 87077 CULTURE AEROBIC IDENTIFY: CPT

## 2025-07-08 PROCEDURE — 85027 COMPLETE CBC AUTOMATED: CPT

## 2025-07-08 PROCEDURE — 84484 ASSAY OF TROPONIN QUANT: CPT

## 2025-07-08 PROCEDURE — 0225U NFCT DS DNA&RNA 21 SARSCOV2: CPT

## 2025-07-08 PROCEDURE — 82962 GLUCOSE BLOOD TEST: CPT

## 2025-07-08 PROCEDURE — 74018 RADEX ABDOMEN 1 VIEW: CPT | Mod: 26

## 2025-07-08 PROCEDURE — 86140 C-REACTIVE PROTEIN: CPT

## 2025-07-08 PROCEDURE — 82553 CREATINE MB FRACTION: CPT

## 2025-07-08 PROCEDURE — 82550 ASSAY OF CK (CPK): CPT

## 2025-07-08 PROCEDURE — 80307 DRUG TEST PRSMV CHEM ANLYZR: CPT

## 2025-07-08 PROCEDURE — 93306 TTE W/DOPPLER COMPLETE: CPT | Mod: 26

## 2025-07-08 PROCEDURE — 83735 ASSAY OF MAGNESIUM: CPT

## 2025-07-08 RX ORDER — POLYETHYLENE GLYCOL-3350 AND ELECTROLYTES 236; 6.74; 5.86; 2.97; 22.74 G/274.31G; G/274.31G; G/274.31G; G/274.31G; G/274.31G
1000 POWDER, FOR SOLUTION ORAL ONCE
Refills: 0 | Status: COMPLETED | OUTPATIENT
Start: 2025-07-08 | End: 2025-07-08

## 2025-07-08 RX ORDER — CENOBAMATE 150-200 MG
50 KIT ORAL AT BEDTIME
Refills: 0 | Status: DISCONTINUED | OUTPATIENT
Start: 2025-07-08 | End: 2025-07-10

## 2025-07-08 RX ADMIN — CLOBAZAM 10 MILLIGRAM(S): 20 TABLET ORAL at 22:00

## 2025-07-08 RX ADMIN — Medication 5 MILLIGRAM(S): at 21:56

## 2025-07-08 RX ADMIN — Medication 1 DROP(S): at 14:04

## 2025-07-08 RX ADMIN — Medication 166.67 MILLIGRAM(S): at 14:25

## 2025-07-08 RX ADMIN — CENOBAMATE 50 MILLIGRAM(S): KIT ORAL at 22:02

## 2025-07-08 RX ADMIN — ROSUVASTATIN CALCIUM 5 MILLIGRAM(S): 20 TABLET, FILM COATED ORAL at 21:56

## 2025-07-08 RX ADMIN — Medication 88 MICROGRAM(S): at 05:32

## 2025-07-08 RX ADMIN — CENOBAMATE 200 MILLIGRAM(S): KIT ORAL at 06:59

## 2025-07-08 RX ADMIN — POLYETHYLENE GLYCOL 3350 17 GRAM(S): 17 POWDER, FOR SOLUTION ORAL at 19:25

## 2025-07-08 RX ADMIN — ENOXAPARIN SODIUM 40 MILLIGRAM(S): 100 INJECTION SUBCUTANEOUS at 05:32

## 2025-07-08 RX ADMIN — Medication 166.67 MILLIGRAM(S): at 02:30

## 2025-07-08 RX ADMIN — Medication 2 TABLET(S): at 21:56

## 2025-07-08 RX ADMIN — Medication 100 MILLIGRAM(S): at 05:32

## 2025-07-08 RX ADMIN — Medication 20 MILLIGRAM(S): at 14:05

## 2025-07-08 RX ADMIN — FOLIC ACID 1 MILLIGRAM(S): 1 TABLET ORAL at 14:04

## 2025-07-08 NOTE — PROGRESS NOTE ADULT - ASSESSMENT
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 Epilepsy consulted for guidance iso AMS and established medical history of epilepsy. Patient more alert today on exam and able to answer questions. vEEG unremarkable for seizure activity.    Recommendations:  - discontinue vEEG  - can continue with current regimen of Xcopri and Clobazam for now until med rec confirmed  - f/u clobazam and Xcopri levels  - please confirm home epilepsy medications given discrepancy between medications provided by [facility] vs on [facility]  - please obtain collateral from outpatient neurologist Dr. Belcher  - f/u ID recs for source control and management of possible infection  - rest per primary team    *Case discussed with Dr. 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 Epilepsy consulted for guidance iso AMS and established medical history of epilepsy. Patient more alert today on exam and able to answer questions. vEEG unremarkable for seizure activity. Given history and presentation, as well positive blood cultures, suspect symptoms likely 2/2 to underlying toxic metabolic encephelopathy.     Recommendations:  - discontinue vEEG  - Continue with Xcopri 250 mg qd and Clobazam 10 mg qd (med rec confirmed-see chart note)  - f/u clobazam and Xcopri levels  - please obtain collateral from outpatient neurologist Dr. Belcher  - f/u ID recs for source control and management of possible infection  - rest per primary team    *Case discussed with Dr. Arguello

## 2025-07-08 NOTE — PROGRESS NOTE ADULT - PROBLEM SELECTOR PLAN 4
Per [facility] on levothyroxine 88mcg qd  TSH 0.856    -C/W home med Last seen for AMS 1/22  Home meds per [facility]: Clobazam 10mg in AM, clobazam 15mg in PM, Xcopri 200mg in AM, Xcopri 50mg qhs, mirtazapine 15mg qhs  [facility] not present at bedside to confirm medications    -Resume AM medications  -F/U levels as above

## 2025-07-08 NOTE — CHART NOTE - NSCHARTNOTEFT_GEN_A_CORE
Called and confirmed medications for patient from home pharmacy [address]. Medications listed as below:    - Clobazam 10mg qd  - Xcopri 250mg qd (given as 50mg and 200mg doses together)  - Mirtazepine 15mg qd  - Levothyroxine 88mcg qd  - Methenamine 1g BID   - Atorvastatin 10mg qhs  - Famotidine 10mg qd  - Vitamin D 100mg qd  - Senna 8.6 2 tablets qd  - MV daily  - folic acid 1mg qd

## 2025-07-08 NOTE — PROGRESS NOTE ADULT - PROBLEM SELECTOR PLAN 3
Last seen for AMS 1/22  Home meds per [facility]: Clobazam 10mg in AM, clobazam 15mg in PM, Xcopri 200mg in AM, Xcopri 50mg qhs, mirtazapine 15mg qhs  [facility] not present at bedside to confirm medications    -Resume AM medications  -F/U levels as above In ED met 1/4 SIRS criteria (fever)  WBC of 7.5, UA negative, RVP negative, CTH negative  CTA with no PE. Extensive soft tissue swelling throughout the right shoulder region, visualized right upper extremity, and anterior upper right chest wall. This has increased since the CT of the abdomen pelvis from 6/26/2025. Several gas foci along the medial aspect of the upper arm. Gas foci within the right medial arm were also visualized on prior CT. No organized fluid collection is imaged.  On exam: RUE without signs of infections, appears to be IV insertion attempt to R arm. No crepitus, erythema, induration noted. NVI in distal hand. No neck stiffness/photophobia. Pupils 5-6mm B/L and reactive    -Continue vancomycin for Staph Epi (pending susceptibilities)  -CTM fever curve  -RUE US  -F/u susceptibilities In ED met 1/4 SIRS criteria (fever)  WBC of 7.5, UA negative, RVP negative, CTH negative  CTA with no PE. Extensive soft tissue swelling throughout the right shoulder region, visualized right upper extremity, and anterior upper right chest wall. This has increased since the CT of the abdomen pelvis from 6/26/2025. Several gas foci along the medial aspect of the upper arm. Gas foci within the right medial arm were also visualized on prior CT. No organized fluid collection is imaged.  On exam: RUE without signs of infections, appears to be IV insertion attempt to R arm. No crepitus, erythema, induration noted. NVI in distal hand. No neck stiffness/photophobia. Pupils 5-6mm B/L and reactive    -Continue vancomycin for Staph Epi (pending susceptibilities)  -CRP, Pro [place]abena  -CTM fever curve  -RUE US  -F/u susceptibilities  -Repeat Blood Cultures  -MRI w/ galTavon

## 2025-07-08 NOTE — PROGRESS NOTE ADULT - SUBJECTIVE AND OBJECTIVE BOX
INTERVAL EVENTS: no significant overnight events.     SUBJECTIVE: No additional complaints or concerns reported by patient.   No cough, chest pain, fevers/chills, shortness of breath. ROS negative otherwise.     Vital Signs Last 24 Hrs  T(C): 36.3 (08 Jul 2025 05:48), Max: 36.8 (07 Jul 2025 12:50)  T(F): 97.3 (08 Jul 2025 05:48), Max: 98.2 (07 Jul 2025 12:50)  HR: 60 (08 Jul 2025 05:48) (60 - 62)  BP: 119/72 (08 Jul 2025 05:48) (98/62 - 119/72)  BP(mean): 74 (07 Jul 2025 20:31) (74 - 84)  RR: 17 (08 Jul 2025 05:48) (17 - 18)  SpO2: 100% (08 Jul 2025 05:48) (95% - 100%)    Parameters below as of 08 Jul 2025 05:48  Patient On (Oxygen Delivery Method): room air    Orthostatic VS  I&O's Summary    07 Jul 2025 07:01  -  08 Jul 2025 07:00  --------------------------------------------------------  IN: 0 mL / OUT: 950 mL / NET: -950 mL        Exam  GENERAL/NEURO Awake, alert, NCAT, following commands, CN grossly intact  ENT: MMM, PERRL  CARDIOVASCULAR: RRR, no murmur  RESPIRATORY: No wheeze/rhonchi/crackles, no accessory muscle use  ABDOMEN: Abdomen soft, NT/ND, bowel sounds x4  SKIN/EXTREMETIES: No rashes, warm, no edema over lower extremeties. Able to move all 4 extremeties, DP intact, reflexes grossly normal.                          12.8   5.02  )-----------( 162      ( 07 Jul 2025 12:42 )             40.5   07-07    139  |  107  |  10  ----------------------------<  97  4.0   |  21[L]  |  0.35[L]    Ca    9.0      07 Jul 2025 12:42  Mg     1.7     07-07    TPro  6.2  /  Alb  3.1[L]  /  TBili  0.2  /  DBili  x   /  AST  15  /  ALT  7[L]  /  AlkPhos  120  07-07      Allergies    penicillin (Anaphylaxis)  aspirin (Other)    Intolerances     Home Medications:  ATORVASTATIN  10MG TABS: TAKE 1 TABLET BY MOUTH DAILY (07 Jul 2025 12:57)  cloBAZam 10 mg oral tablet: 1 tab(s) orally once a day in AM (07 Jul 2025 04:17)  cloBAZam 5 mg oral tablet: 3 tab(s) orally once a day in PM (07 Jul 2025 04:17)  Crestor 5 mg oral tablet: 1 tab(s) orally once a day (07 Jul 2025 04:17)  FAMOTIDINE 10 MG ORAL TABLET: TAKE 1 TABLET (10 MG) BY MOUTH EVERY 12 HOURS AS NEEDED (07 Jul 2025 12:38)  FOLIC ACID   TAB 1MG:  (07 Jul 2025 12:36)  LEVOTHYROXIN TAB 88MCG:  (07 Jul 2025 12:36)  METHENAM HIP TAB 1GM:  (07 Jul 2025 12:30)  methenamine hippurate 1 g oral tablet: 1 tab(s) orally 2 times a day (07 Jul 2025 12:33)  MULTIVITAMIN  TABLET   [facility]: TAKE 1 TABLET  BY MOUTH DAILY (07 Jul 2025 12:36)  SENNA 8.6 MG ORAL TABLET  2024: TAKE 2 TABLETS (17.2 MG) BY MOUTH TWICE DAILY (07 Jul 2025 12:38)  thiamine 100 mg oral tablet: 1 tab(s) orally once a day (07 Jul 2025 04:17)  Vitamin B-12 1000 mcg oral tablet: 1 tab(s) orally once a day (07 Jul 2025 04:17)  VITAMIN B-12 1000 MCG TABS  22: TAKE 1 TABLET BY MOUTH EVERY OTHER DAY (07 Jul 2025 12:38)  XCOPRI       TAB 200MG:  (07 Jul 2025 12:36)  XCOPRI       TAB 50MG:  (07 Jul 2025 12:38)  Xcopri 200 mg oral tablet: 1 tab(s) orally once a day in the morning (07 Jul 2025 04:17)  Xcopri 50 mg oral tablet: 1 tab(s) orally once a day in the evening (07 Jul 2025 04:17)  MEDICATIONS  (STANDING):  artificial tears (preservative free) Ophthalmic Solution 1 Drop(s) Both EYES daily  bisacodyl 5 milliGRAM(s) Oral at bedtime  cenobamate 200 milliGRAM(s) Oral every 24 hours  cloBAZam 10 milliGRAM(s) Oral every 24 hours  enoxaparin Injectable 40 milliGRAM(s) SubCutaneous every 24 hours  famotidine    Tablet 20 milliGRAM(s) Oral daily  folic acid 1 milliGRAM(s) Oral daily  levothyroxine 88 MICROGram(s) Oral every 24 hours  polyethylene glycol 3350 17 Gram(s) Oral every 12 hours  rosuvastatin 5 milliGRAM(s) Oral at bedtime  senna 2 Tablet(s) Oral at bedtime  thiamine 100 milliGRAM(s) Oral every 24 hours  vancomycin  IVPB 1250 milliGRAM(s) IV Intermittent every 12 hours    MEDICATIONS  (PRN):  Activity - Ambulate with Assistance:     Time/Priority:  Routine (07-07-25 @ 04:50) [Active]  Diet, Pureed (07-07-25 @ 04:50) [Active]       INTERVAL EVENTS: no significant overnight events. Normal B12, folate. RPR negative. Utox positive for benzos (i/s/o clozabam)    SUBJECTIVE: No additional complaints or concerns reported by patient.   No cough, chest pain, fevers/chills, shortness of breath. ROS negative otherwise.     Vital Signs Last 24 Hrs  T(C): 36.3 (08 Jul 2025 05:48), Max: 36.8 (07 Jul 2025 12:50)  T(F): 97.3 (08 Jul 2025 05:48), Max: 98.2 (07 Jul 2025 12:50)  HR: 60 (08 Jul 2025 05:48) (60 - 62)  BP: 119/72 (08 Jul 2025 05:48) (98/62 - 119/72)  BP(mean): 74 (07 Jul 2025 20:31) (74 - 84)  RR: 17 (08 Jul 2025 05:48) (17 - 18)  SpO2: 100% (08 Jul 2025 05:48) (95% - 100%)    Parameters below as of 08 Jul 2025 05:48  Patient On (Oxygen Delivery Method): room air    Orthostatic VS  I&O's Summary    07 Jul 2025 07:01  -  08 Jul 2025 07:00  --------------------------------------------------------  IN: 0 mL / OUT: 950 mL / NET: -950 mL      Basic Metabolic Panel (07.08.25 @ 12:00)   Sodium: 137 mmol/L  Potassium: 3.8 mmol/L  Chloride: 107 mmol/L  Carbon Dioxide: 22 mmol/L  Anion Gap: 8 mmol/L  Blood Urea Nitrogen: 11 mg/dL  Creatinine: 0.33 mg/dL  Glucose: 114 mg/dL  Calcium: 8.4 mg/dL  eGFR: 112: The estimated glomerular filtration rate (eGFR) calculation is based on   the 2021 CKD-EPI creatinine equation, which is validated in male and   female population 18 years of age and older (N Engl J Med 2021;   385:4713-6456). mL/min/1.46u1Joagdfpv Blood Count (07.08.25 @ 12:00)   Auto NRBC: 0 /100 WBCs  WBC Count: 3.18 K/uL  RBC Count: 3.65 M/uL  Hemoglobin: 12.6 g/dL  Hematocrit: 41.0 %  Mean Cell Volume: 112.3 fl  Mean Cell Hemoglobin: 34.5 pg  Mean Cell Hemoglobin Conc: 30.7 g/dL  Red Cell Distrib Width: 14.0 %  Platelet Count - Automated: 142 K/uL  MPV: 10.1 fL  Auto Nucleated RBC #: 0.00 K/uLOsmolality, Serum (07.08.25 @ 12:00)   Osmolality, Serum: 291 mosm/kgPhosphorus (07.08.25 @ 05:30)   Phosphorus: 3.3 mg/dLMagnesium (07.08.25 @ 05:30)   Magnesium: 1.9: For pregnant women undergoing magnesium therapy (MgSO4), the therapeutic   range is 5.0-9.0 mg/dL. mg/dLComprehensive Metabolic Panel (07.08.25 @ 05:30)   Sodium: 133: Result confirmed by repeated analysis after passing specimen ID/integrity   check mmol/L  Potassium: See Note: Significant interference due to hemolysis.  Chloride: 103 mmol/L  Carbon Dioxide: 20 mmol/L  Anion Gap: 10 mmol/L  Blood Urea Nitrogen: 12 mg/dL  Creatinine: 0.36 mg/dL  Glucose: 92 mg/dL  Calcium: 8.6 mg/dL  Protein Total: 6.8 g/dL  Albumin: 3.1 g/dL  Bilirubin Total: 0.2 mg/dL  Alkaline Phosphatase: See Note: Significant interference due to hemolysis.  Aspartate Aminotransferase (AST/SGOT): See Note: Significant interference due to hemolysis.  Alanine Aminotransferase (ALT/SGPT): See Note: Significant interference due to hemolysis.  eGFR: 110: The estimated glomerular filtration rate (eGFR) calculation is based on   the 2021 CKD-EPI creatinine equation, which is validated in male and   female population 18 years of age and older (N Engl J Med 2021;   [id number]). mL/min/1.73m2        Exam  GENERAL/NEURO Awake, alert, NCAT, following commands, CN grossly intact  ENT: MMM, PERRL  CARDIOVASCULAR: RRR, no murmur  RESPIRATORY: No wheeze/rhonchi/crackles, no accessory muscle use  ABDOMEN: Abdomen soft, NT/ND, bowel sounds x4  SKIN/EXTREMETIES: No rashes, warm, no edema over lower extremeties. Able to move all 4 extremeties, DP intact, reflexes grossly normal.                          12.8   5.02  )-----------( 162      ( 07 Jul 2025 12:42 )             40.5   07-07    139  |  107  |  10  ----------------------------<  97  4.0   |  21[L]  |  0.35[L]    Ca    9.0      07 Jul 2025 12:42  Mg     1.7     07-07    TPro  6.2  /  Alb  3.1[L]  /  TBili  0.2  /  DBili  x   /  AST  15  /  ALT  7[L]  /  AlkPhos  120  07-07      Allergies    penicillin (Anaphylaxis)  aspirin (Other)    Intolerances     Home Medications:  ATORVASTATIN  10MG TABS: TAKE 1 TABLET BY MOUTH DAILY (07 Jul 2025 12:57)  cloBAZam 10 mg oral tablet: 1 tab(s) orally once a day in AM (07 Jul 2025 04:17)  cloBAZam 5 mg oral tablet: 3 tab(s) orally once a day in PM (07 Jul 2025 04:17)  Crestor 5 mg oral tablet: 1 tab(s) orally once a day (07 Jul 2025 04:17)  FAMOTIDINE 10 MG ORAL TABLET: TAKE 1 TABLET (10 MG) BY MOUTH EVERY 12 HOURS AS NEEDED (07 Jul 2025 12:38)  FOLIC ACID   TAB 1MG:  (07 Jul 2025 12:36)  LEVOTHYROXIN TAB 88MCG:  (07 Jul 2025 12:36)  METHENAM HIP TAB 1GM:  (07 Jul 2025 12:30)  methenamine hippurate 1 g oral tablet: 1 tab(s) orally 2 times a day (07 Jul 2025 12:33)  MULTIVITAMIN  TABLET   [facility]: TAKE 1 TABLET  BY MOUTH DAILY (07 Jul 2025 12:36)  SENNA 8.6 MG ORAL TABLET  2024: TAKE 2 TABLETS (17.2 MG) BY MOUTH TWICE DAILY (07 Jul 2025 12:38)  thiamine 100 mg oral tablet: 1 tab(s) orally once a day (07 Jul 2025 04:17)  Vitamin B-12 1000 mcg oral tablet: 1 tab(s) orally once a day (07 Jul 2025 04:17)  VITAMIN B-12 1000 MCG TABS  22: TAKE 1 TABLET BY MOUTH EVERY OTHER DAY (07 Jul 2025 12:38)  XCOPRI       TAB 200MG:  (07 Jul 2025 12:36)  XCOPRI       TAB 50MG:  (07 Jul 2025 12:38)  Xcopri 200 mg oral tablet: 1 tab(s) orally once a day in the morning (07 Jul 2025 04:17)  Xcopri 50 mg oral tablet: 1 tab(s) orally once a day in the evening (07 Jul 2025 04:17)  MEDICATIONS  (STANDING):  artificial tears (preservative free) Ophthalmic Solution 1 Drop(s) Both EYES daily  bisacodyl 5 milliGRAM(s) Oral at bedtime  cenobamate 200 milliGRAM(s) Oral every 24 hours  cloBAZam 10 milliGRAM(s) Oral every 24 hours  enoxaparin Injectable 40 milliGRAM(s) SubCutaneous every 24 hours  famotidine    Tablet 20 milliGRAM(s) Oral daily  folic acid 1 milliGRAM(s) Oral daily  levothyroxine 88 MICROGram(s) Oral every 24 hours  polyethylene glycol 3350 17 Gram(s) Oral every 12 hours  rosuvastatin 5 milliGRAM(s) Oral at bedtime  senna 2 Tablet(s) Oral at bedtime  thiamine 100 milliGRAM(s) Oral every 24 hours  vancomycin  IVPB 1250 milliGRAM(s) IV Intermittent every 12 hours    MEDICATIONS  (PRN):  Activity - Ambulate with Assistance:     Time/Priority:  Routine (07-07-25 @ 04:50) [Active]  Diet, Pureed (07-07-25 @ 04:50) [Active]       INTERVAL EVENTS: no significant overnight events. Normal B12, folate. RPR negative. Utox positive for benzos (i/s/o clozabam).  AM BMP resulted Na of 133 which corrected to 137 on afternoon labs.  Abdominal XR with significant stool burden. Bladder scan additionally showed BS 400s -> SC (12pm).       SUBJECTIVE: No additional complaints or concerns reported by patient.   No cough, chest pain, fevers/chills, shortness of breath. ROS negative otherwise.     Vital Signs Last 24 Hrs  T(C): 36.3 (08 Jul 2025 05:48), Max: 36.8 (07 Jul 2025 12:50)  T(F): 97.3 (08 Jul 2025 05:48), Max: 98.2 (07 Jul 2025 12:50)  HR: 60 (08 Jul 2025 05:48) (60 - 62)  BP: 119/72 (08 Jul 2025 05:48) (98/62 - 119/72)  BP(mean): 74 (07 Jul 2025 20:31) (74 - 84)  RR: 17 (08 Jul 2025 05:48) (17 - 18)  SpO2: 100% (08 Jul 2025 05:48) (95% - 100%)    Parameters below as of 08 Jul 2025 05:48  Patient On (Oxygen Delivery Method): room air    Orthostatic VS  I&O's Summary    07 Jul 2025 07:01  -  08 Jul 2025 07:00  --------------------------------------------------------  IN: 0 mL / OUT: 950 mL / NET: -950 mL      Basic Metabolic Panel (07.08.25 @ 12:00)   Sodium: 137 mmol/L  Potassium: 3.8 mmol/L  Chloride: 107 mmol/L  Carbon Dioxide: 22 mmol/L  Anion Gap: 8 mmol/L  Blood Urea Nitrogen: 11 mg/dL  Creatinine: 0.33 mg/dL  Glucose: 114 mg/dL  Calcium: 8.4 mg/dL  eGFR: 112: The estimated glomerular filtration rate (eGFR) calculation is based on   the 2021 CKD-EPI creatinine equation, which is validated in male and   female population 18 years of age and older (N Engl J Med 2021;   385:4343-5923). mL/min/1.49p3Tirwlvrh Blood Count (07.08.25 @ 12:00)   Auto NRBC: 0 /100 WBCs  WBC Count: 3.18 K/uL  RBC Count: 3.65 M/uL  Hemoglobin: 12.6 g/dL  Hematocrit: 41.0 %  Mean Cell Volume: 112.3 fl  Mean Cell Hemoglobin: 34.5 pg  Mean Cell Hemoglobin Conc: 30.7 g/dL  Red Cell Distrib Width: 14.0 %  Platelet Count - Automated: 142 K/uL  MPV: 10.1 fL  Auto Nucleated RBC #: 0.00 K/uLOsmolality, Serum (07.08.25 @ 12:00)   Osmolality, Serum: 291 mosm/kgPhosphorus (07.08.25 @ 05:30)   Phosphorus: 3.3 mg/dLMagnesium (07.08.25 @ 05:30)   Magnesium: 1.9: For pregnant women undergoing magnesium therapy (MgSO4), the therapeutic   range is 5.0-9.0 mg/dL. mg/dLComprehensive Metabolic Panel (07.08.25 @ 05:30)   Sodium: 133: Result confirmed by repeated analysis after passing specimen ID/integrity   check mmol/L  Potassium: See Note: Significant interference due to hemolysis.  Chloride: 103 mmol/L  Carbon Dioxide: 20 mmol/L  Anion Gap: 10 mmol/L  Blood Urea Nitrogen: 12 mg/dL  Creatinine: 0.36 mg/dL  Glucose: 92 mg/dL  Calcium: 8.6 mg/dL  Protein Total: 6.8 g/dL  Albumin: 3.1 g/dL  Bilirubin Total: 0.2 mg/dL  Alkaline Phosphatase: See Note: Significant interference due to hemolysis.  Aspartate Aminotransferase (AST/SGOT): See Note: Significant interference due to hemolysis.  Alanine Aminotransferase (ALT/SGPT): See Note: Significant interference due to hemolysis.  eGFR: 110: The estimated glomerular filtration rate (eGFR) calculation is based on   the 2021 CKD-EPI creatinine equation, which is validated in male and   female population 18 years of age and older (N Engl J Med 2021;   [id number]). mL/min/1.73m2        Exam  GENERAL/NEURO Awake, alert, NCAT, following commands, CN grossly intact  ENT: MMM, PERRL  CARDIOVASCULAR: RRR, no murmur  RESPIRATORY: No wheeze/rhonchi/crackles, no accessory muscle use  ABDOMEN: Abdomen soft, NT/ND, bowel sounds x4  SKIN/EXTREMETIES: No rashes, warm, no edema over lower extremeties. Able to move all 4 extremeties, DP intact, reflexes grossly normal.                          12.8   5.02  )-----------( 162      ( 07 Jul 2025 12:42 )             40.5   07-07    139  |  107  |  10  ----------------------------<  97  4.0   |  21[L]  |  0.35[L]    Ca    9.0      07 Jul 2025 12:42  Mg     1.7     07-07    TPro  6.2  /  Alb  3.1[L]  /  TBili  0.2  /  DBili  x   /  AST  15  /  ALT  7[L]  /  AlkPhos  120  07-07      Allergies    penicillin (Anaphylaxis)  aspirin (Other)    Intolerances     Home Medications:  ATORVASTATIN  10MG TABS: TAKE 1 TABLET BY MOUTH DAILY (07 Jul 2025 12:57)  cloBAZam 10 mg oral tablet: 1 tab(s) orally once a day in AM (07 Jul 2025 04:17)  cloBAZam 5 mg oral tablet: 3 tab(s) orally once a day in PM (07 Jul 2025 04:17)  Crestor 5 mg oral tablet: 1 tab(s) orally once a day (07 Jul 2025 04:17)  FAMOTIDINE 10 MG ORAL TABLET: TAKE 1 TABLET (10 MG) BY MOUTH EVERY 12 HOURS AS NEEDED (07 Jul 2025 12:38)  FOLIC ACID   TAB 1MG:  (07 Jul 2025 12:36)  LEVOTHYROXIN TAB 88MCG:  (07 Jul 2025 12:36)  METHENAM HIP TAB 1GM:  (07 Jul 2025 12:30)  methenamine hippurate 1 g oral tablet: 1 tab(s) orally 2 times a day (07 Jul 2025 12:33)  MULTIVITAMIN  TABLET   [facility]: TAKE 1 TABLET  BY MOUTH DAILY (07 Jul 2025 12:36)  SENNA 8.6 MG ORAL TABLET  2024: TAKE 2 TABLETS (17.2 MG) BY MOUTH TWICE DAILY (07 Jul 2025 12:38)  thiamine 100 mg oral tablet: 1 tab(s) orally once a day (07 Jul 2025 04:17)  Vitamin B-12 1000 mcg oral tablet: 1 tab(s) orally once a day (07 Jul 2025 04:17)  VITAMIN B-12 1000 MCG TABS  22: TAKE 1 TABLET BY MOUTH EVERY OTHER DAY (07 Jul 2025 12:38)  XCOPRI       TAB 200MG:  (07 Jul 2025 12:36)  XCOPRI       TAB 50MG:  (07 Jul 2025 12:38)  Xcopri 200 mg oral tablet: 1 tab(s) orally once a day in the morning (07 Jul 2025 04:17)  Xcopri 50 mg oral tablet: 1 tab(s) orally once a day in the evening (07 Jul 2025 04:17)  MEDICATIONS  (STANDING):  artificial tears (preservative free) Ophthalmic Solution 1 Drop(s) Both EYES daily  bisacodyl 5 milliGRAM(s) Oral at bedtime  cenobamate 200 milliGRAM(s) Oral every 24 hours  cloBAZam 10 milliGRAM(s) Oral every 24 hours  enoxaparin Injectable 40 milliGRAM(s) SubCutaneous every 24 hours  famotidine    Tablet 20 milliGRAM(s) Oral daily  folic acid 1 milliGRAM(s) Oral daily  levothyroxine 88 MICROGram(s) Oral every 24 hours  polyethylene glycol 3350 17 Gram(s) Oral every 12 hours  rosuvastatin 5 milliGRAM(s) Oral at bedtime  senna 2 Tablet(s) Oral at bedtime  thiamine 100 milliGRAM(s) Oral every 24 hours  vancomycin  IVPB 1250 milliGRAM(s) IV Intermittent every 12 hours    MEDICATIONS  (PRN):  Activity - Ambulate with Assistance:     Time/Priority:  Routine (07-07-25 @ 04:50) [Active]  Diet, Pureed (07-07-25 @ 04:50) [Active]

## 2025-07-08 NOTE — DIETITIAN INITIAL EVALUATION ADULT - OTHER CALCULATIONS
Estimated needs based on IBW as dosing wt 170 pounds / 77.1 kilograms >120% IBW (121%). Needs adjusted for age and BMI.

## 2025-07-08 NOTE — DIETITIAN INITIAL EVALUATION ADULT - PROBLEM SELECTOR PLAN 2
In ED met 1/4 SIRS criteria (fever)  WBC of 7.5, UA negative, RVP negative, CTH negative  CTA with no PE. Extensive soft tissue swelling throughout the right shoulder region, visualized right upper extremity, and anterior upper right chest wall. This has increased since the CT of the abdomen pelvis from 6/26/2025. Several gas foci along the medial aspect of the upper arm. Gas foci within the right medial arm were also visualized on prior CT. No organized fluid collection is imaged.  On exam: RUE without signs of infections, appears to be IV insertion attempt to R arm. No crepitus, erythema, induration noted. NVI in distal hand. No neck stiffness/photophobia. Pupils 5-6mm B/L and reactive    -Continue off antibiotics at this point, pt remains HDS without clear source of active infection at this point. No concern for meningitis currently as above  -RUE US to evaluate for gas noted on CT  -CTM fever curve, F/U bcx

## 2025-07-08 NOTE — PROGRESS NOTE ADULT - SUBJECTIVE AND OBJECTIVE BOX
Neurology Progress Note    Interval History: No overnight events  Patient seen and examined at bedside. More alert today and able to answer questions. She denies any pain and reiterates that she hasn't had any seizure episodes in a long time.    Vital Signs Last 24 Hrs  T(C): 36.3 (08 Jul 2025 12:13), Max: 36.4 (07 Jul 2025 19:26)  T(F): 97.4 (08 Jul 2025 12:13), Max: 97.5 (07 Jul 2025 19:26)  HR: 66 (08 Jul 2025 12:13) (60 - 66)  BP: 98/64 (08 Jul 2025 12:13) (98/62 - 119/72)  BP(mean): 88 (08 Jul 2025 12:13) (74 - 88)  RR: 17 (08 Jul 2025 12:13) (17 - 18)  SpO2: 94% (08 Jul 2025 12:13) (94% - 100%)    Parameters below as of 08 Jul 2025 12:13  Patient On (Oxygen Delivery Method): room air    Neurological Examination:  MENTAL STATUS: Alert and oriented to name and place; clearly states "I don't know" when asked about the year  LANG/SPEECH: Slow speech but able to answer most questions, repetition intact; able to communicate in English  CRANIAL NERVES:  Eyes: Pupils dilated but equal and reactive; EOM intact, tracks around the room  Face: decreased nasolabial folds on the left side  ENT: normal hearing to speech  MOTOR: intact grasp strength; strength testing consistent with 3/5 in b/l UE and LE; b/l foot drop  REFLEXES: 1+ in b/l LEs, 2+ reflexes in b/l UEs  SENSORY: Normal to touch, vibration, and temperature in all extremities  COORD: intact FTN testing on R side; impaired on L side  Gait: Deferred    Labs:  CBC Full  -  ( 08 Jul 2025 12:00 )  WBC Count : 3.18 K/uL  RBC Count : 3.65 M/uL  Hemoglobin : 12.6 g/dL  Hematocrit : 41.0 %  Platelet Count - Automated : 142 K/uL  Mean Cell Volume : 112.3 fl  Mean Cell Hemoglobin : 34.5 pg  Mean Cell Hemoglobin Concentration : 30.7 g/dL  Auto Neutrophil # : x  Auto Lymphocyte # : x  Auto Monocyte # : x  Auto Eosinophil # : x  Auto Basophil # : x  Auto Neutrophil % : x  Auto Lymphocyte % : x  Auto Monocyte % : x  Auto Eosinophil % : x  Auto Basophil % : x    07-08    137  |  107  |  11  ----------------------------<  114[H]  3.8   |  22  |  0.33[L]    Ca    8.4      08 Jul 2025 12:00  Phos  3.3     07-08  Mg     1.9     07-08    TPro  6.8  /  Alb  3.1[L]  /  TBili  0.2  /  DBili  x   /  AST  See Note  /  ALT  See Note  /  AlkPhos  See Note  07-08    LIVER FUNCTIONS - ( 08 Jul 2025 05:30 )  Alb: 3.1 g/dL / Pro: 6.8 g/dL / ALK PHOS: See Note / ALT: See Note / AST: See Note / GGT: x           PT/INR - ( 06 Jul 2025 17:13 )   PT: 12.1 sec;   INR: 1.04        PTT - ( 06 Jul 2025 17:13 )  PTT:33.7 sec  Urinalysis Basic - ( 08 Jul 2025 12:00 )    Color: x / Appearance: x / SG: x / pH: x  Gluc: 114 mg/dL / Ketone: x  / Bili: x / Urobili: x   Blood: x / Protein: x / Nitrite: x   Leuk Esterase: x / RBC: x / WBC x   Sq Epi: x / Non Sq Epi: x / Bacteria: x    Radiology and Other Testings:    Medications:  artificial tears (preservative free) Ophthalmic Solution 1 Drop(s) Both EYES daily  bisacodyl 5 milliGRAM(s) Oral at bedtime  cenobamate 200 milliGRAM(s) Oral every 24 hours  cloBAZam 10 milliGRAM(s) Oral every 24 hours  enoxaparin Injectable 40 milliGRAM(s) SubCutaneous every 24 hours  famotidine    Tablet 20 milliGRAM(s) Oral daily  folic acid 1 milliGRAM(s) Oral daily  levothyroxine 88 MICROGram(s) Oral every 24 hours  polyethylene glycol 3350 17 Gram(s) Oral every 12 hours  rosuvastatin 5 milliGRAM(s) Oral at bedtime  senna 2 Tablet(s) Oral at bedtime  thiamine 100 milliGRAM(s) Oral every 24 hours  vancomycin  IVPB 1250 milliGRAM(s) IV Intermittent every 12 hours     Neurology Progress Note    Interval History: No overnight events  Patient seen and examined at bedside. More alert today and able to answer questions. She denies any pain and reiterates that she hasn't had any seizure episodes in a long time.    Vital Signs Last 24 Hrs  T(C): 36.3 (08 Jul 2025 12:13), Max: 36.4 (07 Jul 2025 19:26)  T(F): 97.4 (08 Jul 2025 12:13), Max: 97.5 (07 Jul 2025 19:26)  HR: 66 (08 Jul 2025 12:13) (60 - 66)  BP: 98/64 (08 Jul 2025 12:13) (98/62 - 119/72)  BP(mean): 88 (08 Jul 2025 12:13) (74 - 88)  RR: 17 (08 Jul 2025 12:13) (17 - 18)  SpO2: 94% (08 Jul 2025 12:13) (94% - 100%)    Parameters below as of 08 Jul 2025 12:13  Patient On (Oxygen Delivery Method): room air    Neurological Examination:  MENTAL STATUS: Alert and oriented to name and place; clearly states "I don't know" when asked about the year  LANG/SPEECH: Slow speech but able to answer most questions, repetition intact; able to communicate in English  CRANIAL NERVES:  Eyes: Pupils dilated but equal and reactive; EOM intact, tracks around the room  Face: decreased nasolabial folds on the left side  ENT: normal hearing to speech  MOTOR: intact grasp strength; strength testing consistent with 3/5 in b/l UE and LE  REFLEXES: 1+ in b/l LEs, 2+ reflexes in b/l UEs  SENSORY: Normal to touch, vibration, and temperature in all extremities  COORD: intact FTN testing on R side; impaired on L side  Gait: Deferred    Labs:  CBC Full  -  ( 08 Jul 2025 12:00 )  WBC Count : 3.18 K/uL  RBC Count : 3.65 M/uL  Hemoglobin : 12.6 g/dL  Hematocrit : 41.0 %  Platelet Count - Automated : 142 K/uL  Mean Cell Volume : 112.3 fl  Mean Cell Hemoglobin : 34.5 pg  Mean Cell Hemoglobin Concentration : 30.7 g/dL  Auto Neutrophil # : x  Auto Lymphocyte # : x  Auto Monocyte # : x  Auto Eosinophil # : x  Auto Basophil # : x  Auto Neutrophil % : x  Auto Lymphocyte % : x  Auto Monocyte % : x  Auto Eosinophil % : x  Auto Basophil % : x    07-08    137  |  107  |  11  ----------------------------<  114[H]  3.8   |  22  |  0.33[L]    Ca    8.4      08 Jul 2025 12:00  Phos  3.3     07-08  Mg     1.9     07-08    TPro  6.8  /  Alb  3.1[L]  /  TBili  0.2  /  DBili  x   /  AST  See Note  /  ALT  See Note  /  AlkPhos  See Note  07-08    LIVER FUNCTIONS - ( 08 Jul 2025 05:30 )  Alb: 3.1 g/dL / Pro: 6.8 g/dL / ALK PHOS: See Note / ALT: See Note / AST: See Note / GGT: x           PT/INR - ( 06 Jul 2025 17:13 )   PT: 12.1 sec;   INR: 1.04        PTT - ( 06 Jul 2025 17:13 )  PTT:33.7 sec  Urinalysis Basic - ( 08 Jul 2025 12:00 )    Color: x / Appearance: x / SG: x / pH: x  Gluc: 114 mg/dL / Ketone: x  / Bili: x / Urobili: x   Blood: x / Protein: x / Nitrite: x   Leuk Esterase: x / RBC: x / WBC x   Sq Epi: x / Non Sq Epi: x / Bacteria: x    Radiology and Other Testings:    Medications:  artificial tears (preservative free) Ophthalmic Solution 1 Drop(s) Both EYES daily  bisacodyl 5 milliGRAM(s) Oral at bedtime  cenobamate 200 milliGRAM(s) Oral every 24 hours  cloBAZam 10 milliGRAM(s) Oral every 24 hours  enoxaparin Injectable 40 milliGRAM(s) SubCutaneous every 24 hours  famotidine    Tablet 20 milliGRAM(s) Oral daily  folic acid 1 milliGRAM(s) Oral daily  levothyroxine 88 MICROGram(s) Oral every 24 hours  polyethylene glycol 3350 17 Gram(s) Oral every 12 hours  rosuvastatin 5 milliGRAM(s) Oral at bedtime  senna 2 Tablet(s) Oral at bedtime  thiamine 100 milliGRAM(s) Oral every 24 hours  vancomycin  IVPB 1250 milliGRAM(s) IV Intermittent every 12 hours

## 2025-07-08 NOTE — PROGRESS NOTE ADULT - PROBLEM SELECTOR PLAN 1
Pt p/w 1 day of somnolence and declining AMS from baseline, according to [facility]. On exam pt follows commands, but further ROS limited 2/2 baseline mental status.    -UA in ED negative  -CT head r/o acute intracranial causes including hydrocephalus, mass effect, or acute intracranial hemorrhage  -VEEG monitoring-collected  -epilepsy team consulted  -R/O reversible causes, F/U B12/folate, RPR.   -Clobazam and cenobamate level, Utox-pending  -BCx   -Gather med rec from [facility]  -No records per [facility], however Dr. Rowell recently prescribed AEDs Pt p/w 1 day of somnolence and declining AMS from baseline, according to [facility]. On exam pt follows commands, but further ROS limited 2/2 baseline mental status. UA negative in ED. CT head negative r/o acute intracranial causes including hydrocephalus, mass effect, or acute intracranial hemorrhage. Normal B12, folate. RPR negative. Utox positive for benzos (i/s/o clozabam)      Per Epilepsy Recs:  -Discontinue VEEG  - can continue with current regimen of Xcopri and Clobazam for now until med rec confirmed  -f/u Dr. Belcher and Dr. Jeter of Epilepsy clinic  - f/u clobazam and Xcopri levels  - f/u ID recs for source control and management of possible infection  - rest per primary team Pt p/w 1 day of somnolence and declining AMS from baseline, according to [facility]HA. On exam pt follows commands, but further ROS limited 2/2 baseline mental status. UA negative in ED. CT head negative r/o acute intracranial causes including hydrocephalus, mass effect, or acute intracranial hemorrhage. Normal B12, folate. RPR negative. Utox positive for benzos (i/s/o clozabam)      Per Epilepsy Recs:  -Discontinue VEEG  -can continue with current regimen of Xcopri and Clobazam for now until med rec confirmed  -f/u Dr. Belcher and Dr. Jeter of Epilepsy clinic  - f/u clobazam and Xcopri levels  - f/u ID recs for source control and management of possible infection  - rest per primary team Pt p/w 1 day of somnolence and declining AMS from baseline, according to HHA. On exam pt follows commands, but further ROS limited 2/2 baseline mental status. UA negative in ED. CT head negative r/o acute intracranial causes including hydrocephalus, mass effect, or acute intracranial hemorrhage. Normal B12, folate. RPR negative. Utox positive for benzos (i/s/o clozabam)      Per Epilepsy Recs:  -Discontinue VEEG  -can continue with current regimen of Xcopri and Clobazam for now until med rec confirmed  -f/u Dr. Belcher and Dr. Jeter of Epilepsy clinic  - f/u clobazam and Xcopri levels  - rest per primary team

## 2025-07-08 NOTE — DIETITIAN INITIAL EVALUATION ADULT - PERTINENT LABORATORY DATA
07-08    133[L]  |  103  |  12  ----------------------------<  92  See Note   |  20[L]  |  0.36[L]    Ca    8.6      08 Jul 2025 05:30  Phos  3.3     07-08  Mg     1.9     07-08    TPro  6.8  /  Alb  3.1[L]  /  TBili  0.2  /  DBili  x   /  AST  See Note  /  ALT  See Note  /  AlkPhos  See Note  07-08  POCT Blood Glucose.: 103 mg/dL (07-07-25 @ 19:22)  A1C with Estimated Average Glucose Result: 4.7 % (07-07-25 @ 12:42)

## 2025-07-08 NOTE — DIETITIAN INITIAL EVALUATION ADULT - OTHER INFO
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    Pt seen on 7UR for assessment. Labs and medication orders reviewed. Ordered for folic acid, thiamine, PO synthroid. Na 133 <low>, Mg/Phos WNL, BUN/Cr 12 WNL/0.36 <low>. On Pureed diet. Pt resting in bed on visit, confused/disoriented and hypophonic consistent with nursing documentation, limited engagement with RD interview. RD observed pt completed >40% pureed frittata and Taj[place] toast, seeming difficulty lifting utensils and receptive to RD assistance with feed, no overt signs of difficulty chewing/swallowing observed. Nutrition-focused physical examination insignificant for overt signs of wasting. No nausea/vomiting/diarrhea/constipation documented, last BM unknown - bowel regimen ordered. No abdominal distension/discomfort noted, no pain reported or nonverbal indicators noted. No known food allergies. No Baptist/ethnic/cultural food preferences noted. No pressure injuries or edema documented. See nutrition recommendations - RD communicated to team. RD to remain available.

## 2025-07-08 NOTE — PROGRESS NOTE ADULT - ASSESSMENT
A 70-year-old female with a history of epilepsy, VNS placement, hypothyroidism, and Takotsubo cardiomyopathy was brought to the ED by [facility] with altered mental status and fever. Pt is currently afebrile and admitted to [facility] for further evaluation and managment.     A 70-year-old female with a history of epilepsy, VNS placement, hypothyroidism, and Takotsubo cardiomyopathy was brought to the ED by [facility] with altered mental status and fever. Pt is currently afebrile and admitted to [facility] for further evaluation and management

## 2025-07-08 NOTE — CONSULT NOTE ADULT - ASSESSMENT
PT is a 71 y/o F with PMHx of seizure disorder s/p VNS implant in left shoulder revised in May 2025 now hxd3 after 1.5 months of progressively worsening fever, AMS, visual disturbances and n/v of 5 days duration with initial symptoms beginning 2 days after surgical revision of VNS implant. PT is a 71 y/o F with PMHx of seizure disorder s/p VNS implant in left shoulder revised in May 2025 now hxd3 after 1.5 months of progressively worsening fever, AMS, visual disturbances and n/v of 5 days duration with initial symptoms beginning 2 days after surgical revision of VNS implant c/f surgical site infection.   Of note laryngopharyngeal dysfunction can occur 2/2 to VNS in up to 66% of treated patients (PMID: 15534757) potentially contributing to difficulties with Pt verbalization despite PT best efforts.  PT is a 69 y/o F with PMHx of seizure disorder s/p VNS implant in left neck revised in May 2025 now hxd3 after 1.5 months of progressively worsening fever, AMS, visual disturbances and n/v of 5 days duration with initial symptoms beginning 2 days after surgical revision of VNS implant c/f surgical site infection.   Of note laryngopharyngeal dysfunction can occur 2/2 to VNS in up to 66% of treated patients (PMID: 82484351) potentially contributing to difficulties with Pt verbalization despite PT best efforts.

## 2025-07-08 NOTE — CONSULT NOTE ADULT - ATTENDING COMMENTS
Patient seen 7/8   See progress note
70F pmhx refractory epilepsy s/p VNS, revision in May 2025, hypothyroidism, Takotsubo cardiomyopathy admitted with fever, AMS. In the ED, she was reportely awake and responding to questions/following commands but A&Ox1. T 101.8 rectal, other VS were stable. WBC 7.58, UA negative, RVP negative, Lactate 1.9, TSH WNL. CTH No evidence of acute transcortical infarct, acute intracranial hemorrhage, or mass effect.  CTA chest and CT A/P showed extensive soft tissue swelling throughout the right shoulder region, visualized right upper extremity, and anterior upper right chest wall. She was given tylenol and Levaquin 750mg x1 in the ED d/t PCN allergy. Afebrile, HDS since admission. WBC 3k, prev normal.   BCx + 4/4 GPC in clusters, id'ed as S epi and S hemolyticus. I think these are contaminant organisms since 2 different CoNS species present. Primary team c/f bacteremia and c/f device infection, will get gallium scan. EEG with c/f R temporal lobe epileptiform discharges. D/w Dr. Vasquez, per collateral from pts HHA her mentation waxes and wanes. Today, O/E she was obtunded.   - Check Brain MRI wwo IVC  - holding off LP/IV ACV pending gallium and MRI  - Needs goals of care discussion  - Cont Vancomycin and IV CTX 2gm q24h for L shoulder erythema/SSTI  Per primary team [place] and it has therefore been hard to define her goals of care.   ID Team 1 will follow.

## 2025-07-08 NOTE — EEG REPORT - NS EEG TEXT BOX
[facility] Department of Neurology  Inpatient Continuous video-Electroencephalogram      Patient Name:	MARITZA WALLIS    :	1955  MRN:	[id number]    Study Start Date/Time:	2025, 7:00:22 AM  Study End Date/Time:    Referred by:  Lian Arguello MD    Brief Clinical History:  MARITZA WALLIS is a 70 year old Female; study performed to investigate for seizures or markers of epilepsy.     Diagnosis Code:  G40.909 epilepsy unspecified    Pertinent Medication:  n/a    Acquisition Details:  Electroencephalography was acquired using a minimum of 21 channels on an [employer] Neurology system v 9.3.1 with electrode placement according to the standard International 10-20 system following ACNS (American Clinical Neurophysiology Society) guidelines.  Anterior temporal T1 and T2 electrodes were utilized whenever possible.  The [employer] automated spike & seizure detections were all reviewed in detail, in addition to the entire raw EEG.    Findings:  Day 1:  2025, 7:00:22 AM to next morning at 07:00 AM   Background:  continuous, with predominantly alpha/theta frequencies with overriding beta  Generalized Slowing:  None  Symmetry/Focality: No persistent asymmetries of voltage or frequency.       Voltage:  Normal (20+ uV)  Organization:  Rudimentary  Posterior Dominant Rhythm:  7-8 Hz symmetric, well-organized, and well-modulated  Sleep:  Symmetric, synchronous spindles and K complexes.  Variability:   Yes		Reactivity:  Yes    Spontaneous Activity:   1)	Occasional epileptiform discharges over right temporal region   2)	Rare generalized epileptiform discharges      Events:  1)	No electrographic seizures or significant clinical events occurred during this study.  Provocations:  •	Hyperventilation: was not performed.  •	Photic stimulation: was not performed.  Daily Summary:    1)	Mild generalized slowing suggestive of diffuse or multifocal cerebral dysfunction   2)	Occasional right temporal and rare generalized epileptiform discharges suggestive of underlying hyperexcitability  3)	There were no electrographic seizures      Lian Arguello MD  Attending Neurologist, [facility] Epilepsy Program

## 2025-07-08 NOTE — CONSULT NOTE ADULT - SUBJECTIVE AND OBJECTIVE BOX
PT is a delightful 69 y/o F born in [place] with PMHx of seizure disorder status post VNS implant in left shoulder status post revision in May 2025 on hxd3 after presentation at [facility] ED for evaluation of altered mental status. In the ED, vitals significant for T 101.8 rectal, SIRS 1/4.     Patient seen and examined at bedside via  with Chinese speaking HHA present. Patient was lethargic and despite vigorous efforts was unable to provide a thorough history without assistance of home health aid. Conversation with patient and home health aid revealed that patient has suffered progressively worsening symptoms of 1.5 month duration since 2 days after her revision surgery in may, including lethargy, fevers, difficulties with vision unusual for her, and AMS. HHA brought PT to hospital after repeated vomiting starting 07/03 which transitioned into difficulty swallowing on 07/05. Patient able to verbalize that she does not feel pain in either shoulder or at surgical site but that she has felt sick for "many days", she is AOx2 at time of interview, not to time.     Per HHA the warm, erethematous rash on the patients left shoulder/chest is new and was not present prior to her bringing PT to [facility] ED. ED note shows no record of rash. Per chart review, patient has been AOx1-2 on prior admissions with last admission for seizures in 2022, baseline not known to ID team but HHA noted that PT is below baseline at time of interview. A was unable to provide clear history regarding PTs current medication regimen although noted that she "thinks they were changed at the time of her surgery". A also noted that PT has a  who is up to date on all medical history and current regimen. Patient is a 1st gen [place] immigrant per PT who is now happily retired. She has no recent travel history, alcohol or drug use, or sick contacts.     Thank you for providing this interesting consult. ID will be following.    VITAL SIGNS:  T(C): 36.3 (07-08-25 @ 12:13), Max: 36.4 (07-07-25 @ 19:26)  HR: 66 (07-08-25 @ 12:13) (60 - 66)  BP: 98/64 (07-08-25 @ 12:13) (98/62 - 119/72)  RR: 17 (07-08-25 @ 12:13) (17 - 18)  SpO2: 94% (07-08-25 @ 12:13) (94% - 100%)    PHYSICAL EXAM:    Constitutional: Resting comfortably in bed; NAD; Speech is slurred with moderate pauses  ENT: MMM  Neck: supple  Respiratory: CTA B/L  Cardiac: RRR  Gastrointestinal: soft, non tender  Extremities: LUE warm relative to RUE, Non-tender erethematous patch across left shoulder and upper chest warm to touch above 2 cm surgical scar, surgical scar is non-tender and without erethema or discharge. No signs of superficial wound superficial to shoulder or axilla.  Musculoskeletal: NROM x4  Vascular: 2+  DP/PT pulses B/L  Neurologic: AAOx2; follows commands, able to identify 1's and 2's in all quadrants, no focal deficits    artificial tears (preservative free) Ophthalmic Solution 1 Drop(s) Both EYES daily  bisacodyl 5 milliGRAM(s) Oral at bedtime  cenobamate 200 milliGRAM(s) Oral every 24 hours  cloBAZam 10 milliGRAM(s) Oral every 24 hours  enoxaparin Injectable 40 milliGRAM(s) SubCutaneous every 24 hours  famotidine    Tablet 20 milliGRAM(s) Oral daily  folic acid 1 milliGRAM(s) Oral daily  levothyroxine 88 MICROGram(s) Oral every 24 hours  polyethylene glycol 3350 17 Gram(s) Oral every 12 hours  polyethylene glycol/electrolyte Solution. 1000 milliLiter(s) Oral once  rosuvastatin 5 milliGRAM(s) Oral at bedtime  senna 2 Tablet(s) Oral at bedtime  thiamine 100 milliGRAM(s) Oral every 24 hours      Consultant(s) Notes Reviewed:  [x] YES  [ ] NO  Care Discussed with Consultants/Other Providers [x] YES  [ ] NO    LABS:                        12.6   3.18  )-----------( 142      ( 08 Jul 2025 12:00 )             41.0     07-08    137  |  107  |  11  ----------------------------<  114[H]  3.8   |  22  |  0.33[L]    Ca    8.4      08 Jul 2025 12:00  Phos  3.3     07-08  Mg     1.9     07-08    TPro  6.8  /  Alb  3.1[L]  /  TBili  0.2  /  DBili  x   /  AST  See Note  /  ALT  See Note  /  AlkPhos  See Note  07-08    PT/INR - ( 06 Jul 2025 17:13 )   PT: 12.1 sec;   INR: 1.04          PTT - ( 06 Jul 2025 17:13 )  PTT:33.7 sec  Urinalysis Basic - ( 08 Jul 2025 12:00 )    Color: x / Appearance: x / SG: x / pH: x  Gluc: 114 mg/dL / Ketone: x  / Bili: x / Urobili: x   Blood: x / Protein: x / Nitrite: x   Leuk Esterase: x / RBC: x / WBC x   Sq Epi: x / Non Sq Epi: x / Bacteria: x      CAPILLARY BLOOD GLUCOSE      POCT Blood Glucose.: 103 mg/dL (07 Jul 2025 19:22)        Urinalysis Basic - ( 08 Jul 2025 12:00 )    Color: x / Appearance: x / SG: x / pH: x  Gluc: 114 mg/dL / Ketone: x  / Bili: x / Urobili: x   Blood: x / Protein: x / Nitrite: x   Leuk Esterase: x / RBC: x / WBC x   Sq Epi: x / Non Sq Epi: x / Bacteria: x        RADIOLOGY, EKG, & ADDITIONAL TESTS:      Imaging Personally Reviewed:  [x] YES  [ ] NO   PT is a delightful 69 y/o F born in [place] with PMHx of seizure disorder status post VNS implant in left shoulder status post revision in May 2025 on hxd3 after presentation at [facility] ED for evaluation of altered mental status. In the ED, vitals significant for T 101.8 rectal, SIRS 1/4.     Patient seen and examined at bedside via  with Urdu speaking HHA present. Patient was lethargic and despite vigorous efforts was unable to provide a thorough history without assistance of home health aid. Conversation with patient and home health aid revealed that patient has suffered progressively worsening symptoms of 1.5 month duration since 2 days after her revision surgery in may, including lethargy, fevers, difficulties with vision unusual for her, and AMS. HHA brought PT to hospital after repeated vomiting starting 07/03 which transitioned into difficulty swallowing on 07/05. Patient able to verbalize that she does not feel pain in either shoulder or at surgical site but that she has felt sick for "many days", she is AOx2 at time of interview, not to time.     Per HHA the warm, erethematous rash on the patients left shoulder/chest is new and was not present prior to her bringing PT to [facility] ED. ED note shows no record of rash. Per chart review, patient has been AOx1-2 on prior admissions with last admission for seizures in 2022, baseline not known to ID team but HHA noted that PT is below baseline at time of interview. HHA was unable to provide clear history regarding PTs current medication regimen although noted that she "thinks they were changed at the time of her surgery". A also noted that PT has a  who is up to date on all medical history and current regimen. Patient is a 1st gen [place] immigrant per PT who is now happily retired. She has no recent travel history, alcohol or drug use, or sick contacts.     VITAL SIGNS:  T(C): 36.3 (07-08-25 @ 12:13), Max: 36.4 (07-07-25 @ 19:26)  HR: 66 (07-08-25 @ 12:13) (60 - 66)  BP: 98/64 (07-08-25 @ 12:13) (98/62 - 119/72)  RR: 17 (07-08-25 @ 12:13) (17 - 18)  SpO2: 94% (07-08-25 @ 12:13) (94% - 100%)    PHYSICAL EXAM:    Constitutional: Resting comfortably in bed; NAD; Speech is slurred with moderate pauses  ENT: MMM  Neck: supple  Respiratory: CTA B/L  Cardiac: RRR  Gastrointestinal: soft, non tender  Extremities: LUE warm relative to RUE, Non-tender erethematous patch across left shoulder and upper chest warm to touch above 2 cm surgical scar, surgical scar is non-tender and without erethema or discharge. No signs of superficial wound superficial to shoulder or axilla.  Musculoskeletal: NROM x4  Vascular: 2+  DP/PT pulses B/L  Neurologic: AAOx2; follows commands, able to identify 1's and 2's in all quadrants, no focal deficits    artificial tears (preservative free) Ophthalmic Solution 1 Drop(s) Both EYES daily  bisacodyl 5 milliGRAM(s) Oral at bedtime  cenobamate 200 milliGRAM(s) Oral every 24 hours  cloBAZam 10 milliGRAM(s) Oral every 24 hours  enoxaparin Injectable 40 milliGRAM(s) SubCutaneous every 24 hours  famotidine    Tablet 20 milliGRAM(s) Oral daily  folic acid 1 milliGRAM(s) Oral daily  levothyroxine 88 MICROGram(s) Oral every 24 hours  polyethylene glycol 3350 17 Gram(s) Oral every 12 hours  polyethylene glycol/electrolyte Solution. 1000 milliLiter(s) Oral once  rosuvastatin 5 milliGRAM(s) Oral at bedtime  senna 2 Tablet(s) Oral at bedtime  thiamine 100 milliGRAM(s) Oral every 24 hours      Consultant(s) Notes Reviewed:  [x] YES  [ ] NO  Care Discussed with Consultants/Other Providers [x] YES  [ ] NO    LABS:                        12.6   3.18  )-----------( 142      ( 08 Jul 2025 12:00 )             41.0     07-08    137  |  107  |  11  ----------------------------<  114[H]  3.8   |  22  |  0.33[L]    Ca    8.4      08 Jul 2025 12:00  Phos  3.3     07-08  Mg     1.9     07-08    TPro  6.8  /  Alb  3.1[L]  /  TBili  0.2  /  DBili  x   /  AST  See Note  /  ALT  See Note  /  AlkPhos  See Note  07-08    PT/INR - ( 06 Jul 2025 17:13 )   PT: 12.1 sec;   INR: 1.04          PTT - ( 06 Jul 2025 17:13 )  PTT:33.7 sec  Urinalysis Basic - ( 08 Jul 2025 12:00 )    Color: x / Appearance: x / SG: x / pH: x  Gluc: 114 mg/dL / Ketone: x  / Bili: x / Urobili: x   Blood: x / Protein: x / Nitrite: x   Leuk Esterase: x / RBC: x / WBC x   Sq Epi: x / Non Sq Epi: x / Bacteria: x      CAPILLARY BLOOD GLUCOSE      POCT Blood Glucose.: 103 mg/dL (07 Jul 2025 19:22)        Urinalysis Basic - ( 08 Jul 2025 12:00 )    Color: x / Appearance: x / SG: x / pH: x  Gluc: 114 mg/dL / Ketone: x  / Bili: x / Urobili: x   Blood: x / Protein: x / Nitrite: x   Leuk Esterase: x / RBC: x / WBC x   Sq Epi: x / Non Sq Epi: x / Bacteria: x        RADIOLOGY, EKG, & ADDITIONAL TESTS:      Imaging Personally Reviewed:  [x] YES  [ ] NO   PT is a delightful 69 y/o F born in [facility] with PMHx of seizure disorder status post VNS implant in left shoulder status post revision in May 2025 on hxd3 after presentation at [facility] ED for evaluation of altered mental status. In the ED, vitals significant for T 101.8 rectal, SIRS 1/4.     Patient seen and examined at bedside w/  and Burmese speaking home health aid (HHA) present. Patient was lethargic and despite vigorous effort unable to provide a thorough history w/o assistance of HHA. Conversation with patient and HHA revealed that patient has suffered progressively worsening symptoms of 1.5 month duration beginning 2 days after her revision surgery in may, including lethargy, fevers, difficulties with vision unusual for her, and AMS. HHA brought PT to hospital after repeated vomiting starting 07/03 which transitioned into difficulty swallowing on 07/05. Patient able to verbalize that she does not feel pain in either shoulder or at surgical site but that she has felt sick for "many days", she is AOx2 at time of interview, not to time.     Per HHA the warm, erethematous rash on the patients left shoulder/upper chest is new and was not present at time of her bringing PT to [facility] ED. ED note shows no record of rash. Per chart review, patient has been AOx1-2 on prior admissions with last admission for seizures in 2022, baseline not known to ID team but HHA noted that PT was below baseline at time of interview. HHA was unable to provide clear history regarding PTs current medication regimen although noted that she "thinks they were changed at the time of her surgery". HHA also noted that PT has a  who is up to date on all medical history and current regimen. Patient is a 1st gen [place] immigrant per PT who is now happily retired. She has no recent travel history, alcohol or drug use, or sick contacts.     VITAL SIGNS:  T(C): 36.3 (07-08-25 @ 12:13), Max: 36.4 (07-07-25 @ 19:26)  HR: 66 (07-08-25 @ 12:13) (60 - 66)  BP: 98/64 (07-08-25 @ 12:13) (98/62 - 119/72)  RR: 17 (07-08-25 @ 12:13) (17 - 18)  SpO2: 94% (07-08-25 @ 12:13) (94% - 100%)    PHYSICAL EXAM:    Constitutional: Resting comfortably in bed; NAD; Speech is slurred with moderate pauses  ENT: MMM  Neck: supple  Respiratory: CTA B/L  Cardiac: RRR  Gastrointestinal: soft, non tender  Extremities: LUE warm relative to RUE, Non-tender erethematous patch across left shoulder and upper chest warm to touch above 2 cm surgical scar, surgical scar is non-tender and without erethema or discharge. No signs of superficial wound superficial to shoulder or axilla.  Musculoskeletal: NROM x4  Vascular: 2+  DP/PT pulses B/L  Neurologic: AAOx2; follows commands, able to identify 1's and 2's in all quadrants, no focal deficits    artificial tears (preservative free) Ophthalmic Solution 1 Drop(s) Both EYES daily  bisacodyl 5 milliGRAM(s) Oral at bedtime  cenobamate 200 milliGRAM(s) Oral every 24 hours  cloBAZam 10 milliGRAM(s) Oral every 24 hours  enoxaparin Injectable 40 milliGRAM(s) SubCutaneous every 24 hours  famotidine    Tablet 20 milliGRAM(s) Oral daily  folic acid 1 milliGRAM(s) Oral daily  levothyroxine 88 MICROGram(s) Oral every 24 hours  polyethylene glycol 3350 17 Gram(s) Oral every 12 hours  polyethylene glycol/electrolyte Solution. 1000 milliLiter(s) Oral once  rosuvastatin 5 milliGRAM(s) Oral at bedtime  senna 2 Tablet(s) Oral at bedtime  thiamine 100 milliGRAM(s) Oral every 24 hours      Consultant(s) Notes Reviewed:  [x] YES  [ ] NO  Care Discussed with Consultants/Other Providers [x] YES  [ ] NO    LABS:                        12.6   3.18  )-----------( 142      ( 08 Jul 2025 12:00 )             41.0     07-08    137  |  107  |  11  ----------------------------<  114[H]  3.8   |  22  |  0.33[L]    Ca    8.4      08 Jul 2025 12:00  Phos  3.3     07-08  Mg     1.9     07-08    TPro  6.8  /  Alb  3.1[L]  /  TBili  0.2  /  DBili  x   /  AST  See Note  /  ALT  See Note  /  AlkPhos  See Note  07-08    PT/INR - ( 06 Jul 2025 17:13 )   PT: 12.1 sec;   INR: 1.04          PTT - ( 06 Jul 2025 17:13 )  PTT:33.7 sec  Urinalysis Basic - ( 08 Jul 2025 12:00 )    Color: x / Appearance: x / SG: x / pH: x  Gluc: 114 mg/dL / Ketone: x  / Bili: x / Urobili: x   Blood: x / Protein: x / Nitrite: x   Leuk Esterase: x / RBC: x / WBC x   Sq Epi: x / Non Sq Epi: x / Bacteria: x      CAPILLARY BLOOD GLUCOSE      POCT Blood Glucose.: 103 mg/dL (07 Jul 2025 19:22)        Urinalysis Basic - ( 08 Jul 2025 12:00 )    Color: x / Appearance: x / SG: x / pH: x  Gluc: 114 mg/dL / Ketone: x  / Bili: x / Urobili: x   Blood: x / Protein: x / Nitrite: x   Leuk Esterase: x / RBC: x / WBC x   Sq Epi: x / Non Sq Epi: x / Bacteria: x        RADIOLOGY, EKG, & ADDITIONAL TESTS:      Imaging Personally Reviewed:  [x] YES  [ ] NO   PT is a delightful 69 y/o F born in [facility] with PMHx of seizure disorder status post VNS implant in left shoulder status post revision in May 2025 on hxd3 after presentation at [facility] ED for evaluation of altered mental status. In the ED, vitals significant for T 101.8 rectal, SIRS 1/4.     Patient seen and examined at bedside w/  and Nepalese speaking home health aid (HHA) present. Patient was lethargic and despite vigorous effort unable to provide a thorough history w/o assistance of HHA. Conversation with patient and HHA revealed that patient has suffered progressively worsening symptoms of 1.5 month duration beginning 2 days after her revision surgery in may, including lethargy, fevers, difficulties with vision unusual for her, and AMS. HHA brought PT to hospital after repeated vomiting starting 07/03 which transitioned into difficulty swallowing on 07/05. Patient able to verbalize that she does not feel pain in either shoulder or at surgical site but that she has felt sick for "many days", she is AOx2 at time of interview, not to time.     Per HHA the warm, erethematous rash on the patients left shoulder/upper chest is new and was not present at time of her bringing PT to [facility] ED. ED note shows no record of rash. Per chart review, patient has been AOx1-2 on prior admissions with last admission for seizures in 2022, baseline not known to ID team but HHA noted that PT was below baseline at time of interview. HHA was unable to provide clear history regarding PTs current medication regimen although noted that she "thinks they were changed at the time of her surgery". HHA also noted that PT has a  who is up to date on all medical history and current regimen. Patient is a 1st gen [place] immigrant per PT who is now happily retired. She has no recent travel history, alcohol or drug use, or sick contacts.     VITAL SIGNS:  T(C): 36.3 (07-08-25 @ 12:13), Max: 36.4 (07-07-25 @ 19:26)  HR: 66 (07-08-25 @ 12:13) (60 - 66)  BP: 98/64 (07-08-25 @ 12:13) (98/62 - 119/72)  RR: 17 (07-08-25 @ 12:13) (17 - 18)  SpO2: 94% (07-08-25 @ 12:13) (94% - 100%)    PHYSICAL EXAM:    Constitutional: Resting comfortably in bed; NAD; Speech is slurred with moderate pauses  ENT: MMM  Neck: supple  Respiratory: CTA B/L  Cardiac: RRR  Gastrointestinal: soft, non tender  Extremities: LUE warm relative to RUE, Non-tender erethematous patch across left shoulder and upper chest warm to touch below 2 cm surgical scar, surgical scar is non-tender and without erethema or discharge. No signs of superficial wound superficial to shoulder or axilla.  Musculoskeletal: NROM x4  Vascular: 2+  DP/PT pulses B/L  Neurologic: AAOx2; follows commands, able to identify 1's and 2's in all quadrants, no focal deficits    artificial tears (preservative free) Ophthalmic Solution 1 Drop(s) Both EYES daily  bisacodyl 5 milliGRAM(s) Oral at bedtime  cenobamate 200 milliGRAM(s) Oral every 24 hours  cloBAZam 10 milliGRAM(s) Oral every 24 hours  enoxaparin Injectable 40 milliGRAM(s) SubCutaneous every 24 hours  famotidine    Tablet 20 milliGRAM(s) Oral daily  folic acid 1 milliGRAM(s) Oral daily  levothyroxine 88 MICROGram(s) Oral every 24 hours  polyethylene glycol 3350 17 Gram(s) Oral every 12 hours  polyethylene glycol/electrolyte Solution. 1000 milliLiter(s) Oral once  rosuvastatin 5 milliGRAM(s) Oral at bedtime  senna 2 Tablet(s) Oral at bedtime  thiamine 100 milliGRAM(s) Oral every 24 hours      Consultant(s) Notes Reviewed:  [x] YES  [ ] NO  Care Discussed with Consultants/Other Providers [x] YES  [ ] NO    LABS:                        12.6   3.18  )-----------( 142      ( 08 Jul 2025 12:00 )             41.0     07-08    137  |  107  |  11  ----------------------------<  114[H]  3.8   |  22  |  0.33[L]    Ca    8.4      08 Jul 2025 12:00  Phos  3.3     07-08  Mg     1.9     07-08    TPro  6.8  /  Alb  3.1[L]  /  TBili  0.2  /  DBili  x   /  AST  See Note  /  ALT  See Note  /  AlkPhos  See Note  07-08    PT/INR - ( 06 Jul 2025 17:13 )   PT: 12.1 sec;   INR: 1.04          PTT - ( 06 Jul 2025 17:13 )  PTT:33.7 sec  Urinalysis Basic - ( 08 Jul 2025 12:00 )    Color: x / Appearance: x / SG: x / pH: x  Gluc: 114 mg/dL / Ketone: x  / Bili: x / Urobili: x   Blood: x / Protein: x / Nitrite: x   Leuk Esterase: x / RBC: x / WBC x   Sq Epi: x / Non Sq Epi: x / Bacteria: x      CAPILLARY BLOOD GLUCOSE      POCT Blood Glucose.: 103 mg/dL (07 Jul 2025 19:22)        Urinalysis Basic - ( 08 Jul 2025 12:00 )    Color: x / Appearance: x / SG: x / pH: x  Gluc: 114 mg/dL / Ketone: x  / Bili: x / Urobili: x   Blood: x / Protein: x / Nitrite: x   Leuk Esterase: x / RBC: x / WBC x   Sq Epi: x / Non Sq Epi: x / Bacteria: x        RADIOLOGY, EKG, & ADDITIONAL TESTS:      Imaging Personally Reviewed:  [x] YES  [ ] NO

## 2025-07-08 NOTE — DIETITIAN INITIAL EVALUATION ADULT - PROBLEM SELECTOR PLAN 3
Last seen for AMS 1/22  Home meds per sure scripts: Clobazam 10mg in AM, clobazam 15mg in PM, Xcopri 200mg in AM, Xcopri 50mg qhs, mirtazapine 15mg qhs  HHA not present at bedside to confirm medications    -Resume AM medications  -Confirm med rec in AM  -F/U levels as above  - B12 and folate supplementation

## 2025-07-08 NOTE — DIETITIAN INITIAL EVALUATION ADULT - PROBLEM SELECTOR PLAN 1
Pt presenting with 1 day of increased somnolence and found to be febrile by HHA. Baseline A&Ox1-2 from previous charts. HHA not present upon arrival to [facility]  CTH negative  On exam pt follows commands, but further ROS limited 2/2 baseline mental status.    -VEEG monitoring  -R/O reversible causes, F/U B12/folate, RPR. Check clobazam and cenobamate level  -No records per HIE, however Dr. Belcher recently prescribed AEDs, further collateral in AM including med rec  -Epilepsy consult

## 2025-07-08 NOTE — PROGRESS NOTE ADULT - ATTENDING COMMENTS
70-year-old woman with epilepsy, s/p vagal nerve stimulator placement (recently revised ~ , hypothyroidism, and Takotsubo cardiomyopathy was brought to the ED by HHA with change in mental status from baseline and fever. Found to have coag negative staph bacteremia     # Metabolic encephalopathy  # Staph epidermidis and staph hemolyticus bacteremia   On Vanc for Coag negative staph  [ ] f/u repeat blood cultures  [ ] f/u ID recs   [ ] f/u gallium scan   # Epilepsy - f/u EEG final read ; [ ] Find out if patient still taking clobazam [ ] f/u epilepsy recs   # Urinary retention - heavy stool burden on Abd X ray likely contributory to retention. ; Tap water enema, Golytely 1L x 1

## 2025-07-08 NOTE — DIETITIAN INITIAL EVALUATION ADULT - ADD RECOMMEND
1. Continue liberalized diet. Defer consistency to team/SLP.   >>Recommend nursing assistance with all meals.   >>Encourage and monitor PO intake. [place] dietary preferences as able.   2. Monitor GI tolerance, weight trends, labs, and skin integrity.  3. Defer bowel and pain regimens to team.   4. RD to remain available for diet education/intervention prn.

## 2025-07-08 NOTE — DIETITIAN INITIAL EVALUATION ADULT - PERTINENT MEDS FT
MEDICATIONS  (STANDING):  artificial tears (preservative free) Ophthalmic Solution 1 Drop(s) Both EYES daily  bisacodyl 5 milliGRAM(s) Oral at bedtime  cenobamate 200 milliGRAM(s) Oral every 24 hours  cloBAZam 10 milliGRAM(s) Oral every 24 hours  enoxaparin Injectable 40 milliGRAM(s) SubCutaneous every 24 hours  famotidine    Tablet 20 milliGRAM(s) Oral daily  folic acid 1 milliGRAM(s) Oral daily  levothyroxine 88 MICROGram(s) Oral every 24 hours  polyethylene glycol 3350 17 Gram(s) Oral every 12 hours  rosuvastatin 5 milliGRAM(s) Oral at bedtime  senna 2 Tablet(s) Oral at bedtime  thiamine 100 milliGRAM(s) Oral every 24 hours  vancomycin  IVPB 1250 milliGRAM(s) IV Intermittent every 12 hours    MEDICATIONS  (PRN):

## 2025-07-08 NOTE — CONSULT NOTE ADULT - PROBLEM SELECTOR RECOMMENDATION 9
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    -Empiric Cefazolin  -Repeat Blood Cultures  -MRI w/ galolinium of LUE  -CRP   -Pro Kwabena MEDICAL STUDENT NOTE PENDING ID REVIEW:  Given close proximity of surgery date to symptom onset, AMS is most likely 2/2 to surgical site infection related to VNS revision. Suggest ruling out prior to further non-infectious workup of AMS.     -Empiric Cefazolin  -Repeat Blood Cultures  -MRI w/ galolinium of LUE  -CRP   -Pro Kwabena    Thank you for providing this interesting consult. ID will continue to follow as clinical course evolves. Given close proximity of VNS revision date to symptom onset, new onset erythematous rash proximal to surgical site, negative w/u for alternative sources, AMS is most likely 2/2 to surgical site infection. Suggest r/o prior to further non-infectious workup of AMS.     - 1.25g Vancomycin q12  - 1g Ceftriaxone q12   +/- Clindamycin for additional SSI coverage at discretion of medicine team  - Repeat Blood Cultures  - CBC w/ dif on 07/09 AM labs  - MRI w/ gadolinium of L shoulder    Thanks for providing this interesting consult. ID will continue to follow as PTs clinical course evolves. Given close proximity of VNS revision date to symptom onset, new onset erythematous rash proximal to surgical site, negative w/u for alternative sources, AMS is most likely 2/2 to surgical site infection. Suggest r/o prior to further non-infectious workup of AMS.     - 1.25g Vancomycin q12  - 1g Ceftriaxone q12   +/- Clindamycin for additional SSI coverage at discretion of medicine team  - Repeat Blood Cultures  - CBC w/ dif on 07/09 AM labs  - MRI w/ gadolinium of L shoulder    Thanks this interesting consult. ID will continue to follow as PTs clinical course evolves. Given close proximity of VNS revision date to symptom onset, new onset erythematous rash proximal to surgical site, negative w/u for alternative sources, AMS is most likely 2/2 to surgical site infection. Suggest r/o prior to further non-infectious workup of AMS.     - 1.25g Vancomycin q12  - 1g Ceftriaxone q12   +/- Clindamycin for additional SSI coverage at discretion of medicine team  - Repeat Blood Cultures  - CBC w/ dif on 07/09 AM labs  - MRI w/ gadolinium of L shoulder    Thanks for this interesting consult. ID will continue to follow as PTs clinical course evolves. Given close proximity of VNS revision date to symptom onset, new onset erythematous rash proximal to surgical site, negative w/u for alternative sources, AMS is most likely 2/2 to surgical site infection. Suggest r/o prior to further non-infectious workup of AMS.     - 1.25g Vancomycin q12  - 1g Ceftriaxone q12   +/- Clindamycin for additional SSI anaerobe coverage at discretion of medicine team  - Repeat Blood Cultures  - CBC w/ dif on 07/09 AM labs  - MRI w/ gadolinium of L shoulder    Thanks for this interesting consult. ID will continue to follow as PTs clinical course evolves. Given close proximity of VNS revision date to symptom onset, new onset erythematous rash proximal to surgical site, negative w/u for alternative sources, AMS is most likely 2/2 to surgical site infection. Suggest r/o prior to further non-infectious workup of AMS.     - 1.25g Vancomycin q12  - 1g Ceftriaxone q12   +/- Clindamycin for additional SSI anaerobe coverage at discretion of medicine team  - Repeat Blood Cultures  - CBC w/ dif on 07/09 AM labs  - MRI w/ gadolinium of shoulder    Thanks for this interesting consult. ID will continue to follow as PTs clinical course evolves.

## 2025-07-08 NOTE — PROGRESS NOTE ADULT - PROBLEM SELECTOR PLAN 2
In ED met 1/4 SIRS criteria (fever)  WBC of 7.5, UA negative, RVP negative, CTH negative  CTA with no PE. Extensive soft tissue swelling throughout the right shoulder region, visualized right upper extremity, and anterior upper right chest wall. This has increased since the CT of the abdomen pelvis from 6/26/2025. Several gas foci along the medial aspect of the upper arm. Gas foci within the right medial arm were also visualized on prior CT. No organized fluid collection is imaged.  On exam: RUE without signs of infections, appears to be IV insertion attempt to R arm. No crepitus, erythema, induration noted. NVI in distal hand. No neck stiffness/photophobia. Pupils 5-6mm B/L and reactive    -d/c abx  -RUE US  -CTM fever curve  -Monitor for bladder distension  -Bladder scan [id number]-jbqapves cath if residual >330cc and patient does not urinate within 1 hr of B/S; consider Vaz if requires straight cath 3 or more times  -f/u BCx In ED met 1/4 SIRS criteria (fever)  WBC of 7.5, UA negative, RVP negative, CTH negative  CTA with no PE. Extensive soft tissue swelling throughout the right shoulder region, visualized right upper extremity, and anterior upper right chest wall. This has increased since the CT of the abdomen pelvis from 6/26/2025. Several gas foci along the medial aspect of the upper arm. Gas foci within the right medial arm were also visualized on prior CT. No organized fluid collection is imaged.  On exam: RUE without signs of infections, appears to be IV insertion attempt to R arm. No crepitus, erythema, induration noted. NVI in distal hand. No neck stiffness/photophobia. Pupils 5-6mm B/L and reactive    -Continue vancomycin for Staph Epi (pending susceptibilities)  -CTM fever curve  -RUE US  -monitor for bladder distention  -Bladder scan G3m-msshemxn cath if residual >330cc and patient does not urinate within 1 hr of B/S; consider Vaz if requires straight cath 3 or more times  -F/u susceptibilities AXR with stool burden.  BS 400s -> patient received SC       -Tap water enema  -Golytely  -Continue with home Senna  -Add Miralax to bowel regimen  -Bladder scan Q6s-honckbyh cath if residual >330cc and patient does not urinate within 1 hr of B/S; consider Wilson if requires straight cath 3 or more times  -CTM bowel movements and urine output, if patient still retaining after stool cleared consider wilson

## 2025-07-09 LAB
-  CLINDAMYCIN: SIGNIFICANT CHANGE UP
-  CLINDAMYCIN: SIGNIFICANT CHANGE UP
-  ERYTHROMYCIN: SIGNIFICANT CHANGE UP
-  ERYTHROMYCIN: SIGNIFICANT CHANGE UP
-  GENTAMICIN: SIGNIFICANT CHANGE UP
-  GENTAMICIN: SIGNIFICANT CHANGE UP
-  OXACILLIN: SIGNIFICANT CHANGE UP
-  OXACILLIN: SIGNIFICANT CHANGE UP
-  PENICILLIN: SIGNIFICANT CHANGE UP
-  PENICILLIN: SIGNIFICANT CHANGE UP
-  RIFAMPIN: SIGNIFICANT CHANGE UP
-  RIFAMPIN: SIGNIFICANT CHANGE UP
-  TETRACYCLINE: SIGNIFICANT CHANGE UP
-  TETRACYCLINE: SIGNIFICANT CHANGE UP
-  TRIMETHOPRIM/SULFAMETHOXAZOLE: SIGNIFICANT CHANGE UP
-  TRIMETHOPRIM/SULFAMETHOXAZOLE: SIGNIFICANT CHANGE UP
-  VANCOMYCIN: SIGNIFICANT CHANGE UP
-  VANCOMYCIN: SIGNIFICANT CHANGE UP
ADD ON TEST-SPECIMEN IN LAB: SIGNIFICANT CHANGE UP
ANION GAP SERPL CALC-SCNC: 9 MMOL/L — SIGNIFICANT CHANGE UP (ref 5–17)
BLD GP AB SCN SERPL QL: NEGATIVE — SIGNIFICANT CHANGE UP
BUN SERPL-MCNC: 14 MG/DL — SIGNIFICANT CHANGE UP (ref 7–23)
CALCIUM SERPL-MCNC: 8.6 MG/DL — SIGNIFICANT CHANGE UP (ref 8.4–10.5)
CHLORIDE SERPL-SCNC: 107 MMOL/L — SIGNIFICANT CHANGE UP (ref 96–108)
CO2 SERPL-SCNC: 24 MMOL/L — SIGNIFICANT CHANGE UP (ref 22–31)
CREAT SERPL-MCNC: 0.36 MG/DL — LOW (ref 0.5–1.3)
EGFR: 110 ML/MIN/1.73M2 — SIGNIFICANT CHANGE UP
EGFR: 110 ML/MIN/1.73M2 — SIGNIFICANT CHANGE UP
GLUCOSE SERPL-MCNC: 102 MG/DL — HIGH (ref 70–99)
HCT VFR BLD CALC: 36.4 % — SIGNIFICANT CHANGE UP (ref 34.5–45)
HGB BLD-MCNC: 11.9 G/DL — SIGNIFICANT CHANGE UP (ref 11.5–15.5)
MCHC RBC-ENTMCNC: 32.7 G/DL — SIGNIFICANT CHANGE UP (ref 32–36)
MCHC RBC-ENTMCNC: 33.9 PG — SIGNIFICANT CHANGE UP (ref 27–34)
MCV RBC AUTO: 103.7 FL — HIGH (ref 80–100)
METHOD TYPE: SIGNIFICANT CHANGE UP
METHOD TYPE: SIGNIFICANT CHANGE UP
NRBC # BLD AUTO: 0 K/UL — SIGNIFICANT CHANGE UP (ref 0–0)
NRBC # FLD: 0 K/UL — SIGNIFICANT CHANGE UP (ref 0–0)
NRBC BLD AUTO-RTO: 0 /100 WBCS — SIGNIFICANT CHANGE UP (ref 0–0)
PLATELET # BLD AUTO: 203 K/UL — SIGNIFICANT CHANGE UP (ref 150–400)
PMV BLD: 9.6 FL — SIGNIFICANT CHANGE UP (ref 7–13)
POTASSIUM SERPL-MCNC: 4 MMOL/L — SIGNIFICANT CHANGE UP (ref 3.5–5.3)
POTASSIUM SERPL-SCNC: 4 MMOL/L — SIGNIFICANT CHANGE UP (ref 3.5–5.3)
RBC # BLD: 3.51 M/UL — LOW (ref 3.8–5.2)
RBC # FLD: 13.7 % — SIGNIFICANT CHANGE UP (ref 10.3–14.5)
RH IG SCN BLD-IMP: POSITIVE — SIGNIFICANT CHANGE UP
SODIUM SERPL-SCNC: 140 MMOL/L — SIGNIFICANT CHANGE UP (ref 135–145)
WBC # BLD: 4.58 K/UL — SIGNIFICANT CHANGE UP (ref 3.8–10.5)
WBC # FLD AUTO: 4.58 K/UL — SIGNIFICANT CHANGE UP (ref 3.8–10.5)

## 2025-07-09 PROCEDURE — 74018 RADEX ABDOMEN 1 VIEW: CPT | Mod: 26

## 2025-07-09 PROCEDURE — G0545: CPT

## 2025-07-09 PROCEDURE — 99233 SBSQ HOSP IP/OBS HIGH 50: CPT

## 2025-07-09 PROCEDURE — 99233 SBSQ HOSP IP/OBS HIGH 50: CPT | Mod: GC

## 2025-07-09 PROCEDURE — 99232 SBSQ HOSP IP/OBS MODERATE 35: CPT

## 2025-07-09 RX ORDER — CYST/ALA/Q10/PHOS.SER/DHA/BROC 100-20-50
1 POWDER (GRAM) ORAL DAILY
Refills: 0 | Status: DISCONTINUED | OUTPATIENT
Start: 2025-07-09 | End: 2025-07-10

## 2025-07-09 RX ORDER — LORAZEPAM 4 MG/ML
1 VIAL (ML) INJECTION ONCE
Refills: 0 | Status: DISCONTINUED | OUTPATIENT
Start: 2025-07-09 | End: 2025-07-10

## 2025-07-09 RX ORDER — VANCOMYCIN HCL IN 5 % DEXTROSE 1.5G/250ML
1000 PLASTIC BAG, INJECTION (ML) INTRAVENOUS ONCE
Refills: 0 | Status: COMPLETED | OUTPATIENT
Start: 2025-07-09 | End: 2025-07-09

## 2025-07-09 RX ORDER — LACTULOSE 10 G/15ML
10 SOLUTION ORAL EVERY 6 HOURS
Refills: 0 | Status: COMPLETED | OUTPATIENT
Start: 2025-07-09 | End: 2025-07-10

## 2025-07-09 RX ORDER — VANCOMYCIN HCL IN 5 % DEXTROSE 1.5G/250ML
1000 PLASTIC BAG, INJECTION (ML) INTRAVENOUS EVERY 12 HOURS
Refills: 0 | Status: DISCONTINUED | OUTPATIENT
Start: 2025-07-09 | End: 2025-07-09

## 2025-07-09 RX ORDER — MINERAL OIL
133 OIL (ML) MISCELLANEOUS ONCE
Refills: 0 | Status: COMPLETED | OUTPATIENT
Start: 2025-07-09 | End: 2025-07-09

## 2025-07-09 RX ADMIN — POLYETHYLENE GLYCOL 3350 17 GRAM(S): 17 POWDER, FOR SOLUTION ORAL at 06:36

## 2025-07-09 RX ADMIN — Medication 88 MICROGRAM(S): at 06:36

## 2025-07-09 RX ADMIN — POLYETHYLENE GLYCOL 3350 17 GRAM(S): 17 POWDER, FOR SOLUTION ORAL at 17:45

## 2025-07-09 RX ADMIN — Medication 5 MILLIGRAM(S): at 21:20

## 2025-07-09 RX ADMIN — Medication 20 MILLIGRAM(S): at 11:41

## 2025-07-09 RX ADMIN — ROSUVASTATIN CALCIUM 5 MILLIGRAM(S): 20 TABLET, FILM COATED ORAL at 21:20

## 2025-07-09 RX ADMIN — Medication 105 MILLIGRAM(S): at 17:45

## 2025-07-09 RX ADMIN — LACTULOSE 10 GRAM(S): 10 SOLUTION ORAL at 17:45

## 2025-07-09 RX ADMIN — Medication 2 TABLET(S): at 21:20

## 2025-07-09 RX ADMIN — Medication 1 DROP(S): at 11:41

## 2025-07-09 RX ADMIN — ENOXAPARIN SODIUM 40 MILLIGRAM(S): 100 INJECTION SUBCUTANEOUS at 06:35

## 2025-07-09 RX ADMIN — Medication 133 MILLILITER(S): at 11:42

## 2025-07-09 RX ADMIN — CENOBAMATE 200 MILLIGRAM(S): KIT ORAL at 06:37

## 2025-07-09 RX ADMIN — FOLIC ACID 1 MILLIGRAM(S): 1 TABLET ORAL at 11:41

## 2025-07-09 RX ADMIN — Medication 250 MILLIGRAM(S): at 10:26

## 2025-07-09 RX ADMIN — Medication 100 MILLIGRAM(S): at 06:36

## 2025-07-09 RX ADMIN — CLOBAZAM 10 MILLIGRAM(S): 20 TABLET ORAL at 21:22

## 2025-07-09 RX ADMIN — Medication 105 MILLIGRAM(S): at 21:20

## 2025-07-09 RX ADMIN — Medication 250 MILLIGRAM(S): at 21:21

## 2025-07-09 RX ADMIN — CENOBAMATE 50 MILLIGRAM(S): KIT ORAL at 21:24

## 2025-07-09 NOTE — PROGRESS NOTE ADULT - SUBJECTIVE AND OBJECTIVE BOX
SUBJECTIVE:   Pt unable to engage interview due to AMS.    OBJECTIVE:    VITALS:  T(C): 36.4 (07-09-25 @ 09:00), Max: 36.4 (07-08-25 @ 20:31)  HR: 55 (07-09-25 @ 09:00) (55 - 63)  BP: 115/61 (07-09-25 @ 09:00) (99/62 - 122/69)  RR: 18 (07-09-25 @ 09:00) (15 - 18)  SpO2: 96% (07-09-25 @ 09:00) (95% - 96%)    PHYSICAL EXAM:  Constitutional: Resting comfortably in bed; NAD; Speech is slurred with moderate pauses  ENT: MMM  Neck: supple  Respiratory: CTA B/L  Cardiac: RRR  Gastrointestinal: soft, non tender  Extremities: Non tender BL UE. No signs of rash. No signs of superficial wound superficial to shoulder or axilla.  Musculoskeletal: NROM x4  Vascular: 2+  DP/PT pulses B/L  Neurologic: AAOx2; Unable to follow commands, no focal deficits    artificial tears (preservative free) Ophthalmic Solution 1 Drop(s) Both EYES daily  bisacodyl 5 milliGRAM(s) Oral at bedtime  cenobamate 200 milliGRAM(s) Oral every 24 hours  cenobamate 50 milliGRAM(s) Oral at bedtime  cloBAZam 10 milliGRAM(s) Oral every 24 hours  enoxaparin Injectable 40 milliGRAM(s) SubCutaneous every 24 hours  famotidine    Tablet 20 milliGRAM(s) Oral daily  folic acid 1 milliGRAM(s) Oral daily  levothyroxine 88 MICROGram(s) Oral every 24 hours  LORazepam   Injectable 1 milliGRAM(s) IV Push once PRN  polyethylene glycol 3350 17 Gram(s) Oral every 12 hours  rosuvastatin 5 milliGRAM(s) Oral at bedtime  senna 2 Tablet(s) Oral at bedtime  thiamine 100 milliGRAM(s) Oral every 24 hours  vancomycin  IVPB 1000 milliGRAM(s) IV Intermittent every 12 hours      Consultant(s) Notes Reviewed:  [x] YES  [ ] NO  Care Discussed with Consultants/Other Providers [x] YES  [ ] NO    LABS:                        11.9   4.58  )-----------( 203      ( 09 Jul 2025 00:53 )             36.4     07-09    140  |  107  |  14  ----------------------------<  102[H]  4.0   |  24  |  0.36[L]    Ca    8.6      09 Jul 2025 00:53  Phos  3.3     07-08  Mg     1.9     07-08    TPro  6.8  /  Alb  3.1[L]  /  TBili  0.2  /  DBili  x   /  AST  See Note  /  ALT  See Note  /  AlkPhos  See Note  07-08      Urinalysis Basic - ( 09 Jul 2025 00:53 )    Color: x / Appearance: x / SG: x / pH: x  Gluc: 102 mg/dL / Ketone: x  / Bili: x / Urobili: x   Blood: x / Protein: x / Nitrite: x   Leuk Esterase: x / RBC: x / WBC x   Sq Epi: x / Non Sq Epi: x / Bacteria: x      CAPILLARY BLOOD GLUCOSE      POCT Blood Glucose.: 86 mg/dL (09 Jul 2025 09:12)  POCT Blood Glucose.: 108 mg/dL (08 Jul 2025 23:17)        Urinalysis Basic - ( 09 Jul 2025 00:53 )    Color: x / Appearance: x / SG: x / pH: x  Gluc: 102 mg/dL / Ketone: x  / Bili: x / Urobili: x   Blood: x / Protein: x / Nitrite: x   Leuk Esterase: x / RBC: x / WBC x   Sq Epi: x / Non Sq Epi: x / Bacteria: x        RADIOLOGY, EKG, & ADDITIONAL TESTS:      Imaging Personally Reviewed:  [x] YES  [ ] NO      HEALTH ISSUES - PROBLEM Dx:  Acute metabolic encephalopathy    History of epilepsy    Hypothyroid    Hyperlipidemia    Prophylactic measure    Fever    Urinary retention

## 2025-07-09 NOTE — SWALLOW BEDSIDE ASSESSMENT ADULT - SWALLOW EVAL: DIAGNOSIS
Pt at increased aspiration risk in setting of lethargy and AMS. Pt appears safe to continue a puree diet/thin liquids based off this bedside assessment. No overt s/s aspiration or changes in respiratory status observed across all PO intake. Ensure adherence to strict aspiration precautions and 1:1 feeding assistance. Hold if not alert. Thorough oral care. This team to follow while admitted.

## 2025-07-09 NOTE — PROGRESS NOTE ADULT - ATTENDING COMMENTS
Spoke to patient's outpatient Neurologist at [facility]- Dr Belcher  Patient well known to her. 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 Last seizure and med changes > 2 years ago.  She has had frequent admissions to [facility] For acute AMS of unknown origin with EEGs and work up unrevealing for source. Mental status typically improves on its own.    VNS battery was changed by Camryn Gregory at [facility] on 5/12/2025. There have been no changes to the VNS setting and on interrogation today was confirmed to be the same as per Neurologist (2/2.25/2.5).     Dr. Belcher did share that patient's  called 6/27th and reported patient had persistent nausea/vomiting. They were advised to see GI and PMD.    Dr Vasquez also confirmed that patient looked like her baseline yesterday as per aid.  Unclear cause of waxing/waning mental status.  Unlikely related to AEDs or seizures given stability for years.  We discsussed of gallium scan is unrevealing and no improvement in mental status will consider MRI Brain (VNS will need to be turned off) and possibly LP Spoke to patient's outpatient Neurologist at [facility]- Dr Belcher  Patient well known to her. 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 Last seizure and med changes > 2 years ago.  She has had frequent admissions to [facility] For acute AMS of unknown origin with EEGs and work up unrevealing for source. Mental status typically improves on its own.    VNS battery was changed by Camryn Gregory at [facility] on 5/12/2025. There have been no changes to the VNS setting and on interrogation today was confirmed to be the same as per Neurologist (2/2.25/2.5).     Dr. Belcher did share that patient's  called 6/27th and reported patient had persistent nausea/vomiting. They were advised to see GI and PMD.    Dr Vasquez also confirmed that patient looked like her baseline yesterday as per aid.  Unclear cause of waxing/waning mental status.  Unlikely related to AEDs or seizures given stability for years.  We discussed if gallium scan is unrevealing and no improvement in mental status will consider MRI Brain (VNS will need to be turned off) and possibly LP

## 2025-07-09 NOTE — PROGRESS NOTE ADULT - SUBJECTIVE AND OBJECTIVE BOX
INTERVAL HPI/OVERNIGHT EVENTS:  OVERNIGHT: ***    SUBJECTIVE: ***    Vital Signs Last 24 Hrs  T(C): 36.4 (09 Jul 2025 05:52), Max: 36.4 (08 Jul 2025 20:31)  T(F): 97.6 (09 Jul 2025 05:52), Max: 97.6 (08 Jul 2025 20:31)  HR: 56 (09 Jul 2025 05:52) (56 - 66)  BP: 122/69 (09 Jul 2025 05:52) (98/64 - 122/69)  BP(mean): 86 (09 Jul 2025 05:52) (74 - 88)  RR: 15 (09 Jul 2025 05:52) (15 - 18)  SpO2: 95% (09 Jul 2025 05:52) (94% - 96%)    Parameters below as of 09 Jul 2025 05:52  Patient On (Oxygen Delivery Method): room air        PHYSICAL EXAM:  General: ***  Eyes: EOMI intact bilaterally. Anicteric sclerae, moist conjunctivae  HENT: Moist mucous membranes  Neck: Trachea midline, supple ***  Lungs: Clear to ascultation B/L. No wheezes, rales, or rhonchi  Cardiovascular: RRR. No murmurs, rubs, or gallops  Abdomen: Soft, non-tender non-distended; No rebound or guarding  Extremities: WWP, No clubbing, cyanosis or edema  Neurological: Alert and oriented   Skin: Warm and dry. No obvious rash     LABS:                        11.9   4.58  )-----------( 203      ( 09 Jul 2025 00:53 )             36.4     07-09    140  |  107  |  14  ----------------------------<  102[H]  4.0   |  24  |  0.36[L]    Ca    8.6      09 Jul 2025 00:53  Phos  3.3     07-08  Mg     1.9     07-08    TPro  6.8  /  Alb  3.1[L]  /  TBili  0.2  /  DBili  x   /  AST  See Note  /  ALT  See Note  /  AlkPhos  See Note  07-08   A 70-year-old Indonesian speaking female with a history of epilepsy, VNS placement, hypothyroidism, and Takotsubo cardiomyopathy presented to the ED with altered mental status and fever per HHA admitted for workup of acute metabolic encephalopathy. Per HHA, pt has been more lethargic since she had a procedure to ?revise/change batteries? on her VSG in ?May. (Per collateral from [facility], she has also had increased vomiting since then, and epilepsy centre nurse advised to consider turning off VSG to see if that helped resolve symptoms). She was found to have Staph epi / hemolyticus bacteremia and was started on vancomycin. Video EEG was also completed (see consult notes). Pt underwent TTE given bacteremia and instrumentation (VSG) with no vegetation. Pt has been retaining urine and required several intermittent catheterizations; abdominal XR showed considerable stool burden for which patient was given tap water enema. Pt was admitted with swelling in RUE-on CT appears noninfectious but U/S was postponed given that she was on video EEG.     Plan-f/u surveillance BCx, consider Vaz, f/u constipation, gallium scan (per ID recs), RUE U/S    INTERVAL HPI/OVERNIGHT EVENTS:  OVERNIGHT: ***    SUBJECTIVE: ***    Vital Signs Last 24 Hrs  T(C): 36.4 (09 Jul 2025 05:52), Max: 36.4 (08 Jul 2025 20:31)  T(F): 97.6 (09 Jul 2025 05:52), Max: 97.6 (08 Jul 2025 20:31)  HR: 56 (09 Jul 2025 05:52) (56 - 66)  BP: 122/69 (09 Jul 2025 05:52) (98/64 - 122/69)  BP(mean): 86 (09 Jul 2025 05:52) (74 - 88)  RR: 15 (09 Jul 2025 05:52) (15 - 18)  SpO2: 95% (09 Jul 2025 05:52) (94% - 96%)    Parameters below as of 09 Jul 2025 05:52  Patient On (Oxygen Delivery Method): room air        PHYSICAL EXAM:  General: ***  Eyes: EOMI intact bilaterally. Anicteric sclerae, moist conjunctivae  HENT: Moist mucous membranes  Neck: Trachea midline, supple ***  Lungs: Clear to ascultation B/L. No wheezes, rales, or rhonchi  Cardiovascular: RRR. No murmurs, rubs, or gallops  Abdomen: Soft, non-tender non-distended; No rebound or guarding  Extremities: WWP, No clubbing, cyanosis or edema  Neurological: Alert and oriented   Skin: Warm and dry. No obvious rash     LABS:                        11.9   4.58  )-----------( 203      ( 09 Jul 2025 00:53 )             36.4     07-09    140  |  107  |  14  ----------------------------<  102[H]  4.0   |  24  |  0.36[L]    Ca    8.6      09 Jul 2025 00:53  Phos  3.3     07-08  Mg     1.9     07-08    TPro  6.8  /  Alb  3.1[L]  /  TBili  0.2  /  DBili  x   /  AST  See Note  /  ALT  See Note  /  AlkPhos  See Note  07-08   Ms. Key is a 70-year-old Filipino speaking female with a history of epilepsy, VNS placement, hypothyroidism, and Takotsubo cardiomyopathy presented to the ED with altered mental status and fever per HHA admitted for workup of acute metabolic encephalopathy. Per HHA, pt has been more lethargic since she had a procedure to ?revise/change batteries? on her VSG in ?May. (Per collateral from [facility], she has also had increased vomiting since then, and epilepsy centre nurse advised to consider turning off VSG to see if that helped resolve symptoms). She was found to have Staph epi / hemolyticus bacteremia and was started on vancomycin. Video EEG was also completed (see consult notes). Pt underwent TTE given bacteremia and instrumentation (VSG) with no vegetation. Pt has been retaining urine and required several intermittent catheterizations; abdominal XR showed considerable stool burden for which patient was given tap water enema. Pt was admitted with swelling in RUE-on CT appears noninfectious but U/S was postponed given that she was on video EEG.     Plan-f/u surveillance BCx, consider Vaz, f/u constipation, gallium scan (per ID recs), RUE U/S    INTERVAL HPI/OVERNIGHT EVENTS:  OVERNIGHT: ***    SUBJECTIVE: ***    Vital Signs Last 24 Hrs  T(C): 36.4 (09 Jul 2025 05:52), Max: 36.4 (08 Jul 2025 20:31)  T(F): 97.6 (09 Jul 2025 05:52), Max: 97.6 (08 Jul 2025 20:31)  HR: 56 (09 Jul 2025 05:52) (56 - 66)  BP: 122/69 (09 Jul 2025 05:52) (98/64 - 122/69)  BP(mean): 86 (09 Jul 2025 05:52) (74 - 88)  RR: 15 (09 Jul 2025 05:52) (15 - 18)  SpO2: 95% (09 Jul 2025 05:52) (94% - 96%)    Parameters below as of 09 Jul 2025 05:52  Patient On (Oxygen Delivery Method): room air        PHYSICAL EXAM:  General: ***  Eyes: EOMI intact bilaterally. Anicteric sclerae, moist conjunctivae  HENT: Moist mucous membranes  Neck: Trachea midline, supple ***  Lungs: Clear to ascultation B/L. No wheezes, rales, or rhonchi  Cardiovascular: RRR. No murmurs, rubs, or gallops  Abdomen: Soft, non-tender non-distended; No rebound or guarding  Extremities: WWP, No clubbing, cyanosis or edema  Neurological: Alert and oriented   Skin: Warm and dry. No obvious rash     LABS:                        11.9   4.58  )-----------( 203      ( 09 Jul 2025 00:53 )             36.4     07-09    140  |  107  |  14  ----------------------------<  102[H]  4.0   |  24  |  0.36[L]    Ca    8.6      09 Jul 2025 00:53  Phos  3.3     07-08  Mg     1.9     07-08    TPro  6.8  /  Alb  3.1[L]  /  TBili  0.2  /  DBili  x   /  AST  See Note  /  ALT  See Note  /  AlkPhos  See Note  07-08   Ms. Key is a 70-year-old Macedonian speaking female with a history of epilepsy, VNS placement, hypothyroidism, and Takotsubo cardiomyopathy presented to the ED with altered mental status and fever per HHA admitted for workup of acute metabolic encephalopathy.     INTERVAL HPI/OVERNIGHT EVENTS:  OVERNIGHT: Concerns from overnight team for worsening mental status. Possible concern for seizures, but holding off on re-adding the VEEG for now. Echo with normal findings re: vegetations. Concern for possible infectious source at site of implanted device. Elevated CRP with patient remaining afebrile.     SUBJECTIVE:     Vital Signs Last 24 Hrs  T(C): 36.4 (09 Jul 2025 05:52), Max: 36.4 (08 Jul 2025 20:31)  T(F): 97.6 (09 Jul 2025 05:52), Max: 97.6 (08 Jul 2025 20:31)  HR: 56 (09 Jul 2025 05:52) (56 - 66)  BP: 122/69 (09 Jul 2025 05:52) (98/64 - 122/69)  BP(mean): 86 (09 Jul 2025 05:52) (74 - 88)  RR: 15 (09 Jul 2025 05:52) (15 - 18)  SpO2: 95% (09 Jul 2025 05:52) (94% - 96%)    Parameters below as of 09 Jul 2025 05:52  Patient On (Oxygen Delivery Method): room air    POCT Blood Glucose (07.09.25 @ 09:12)   POCT Blood Glucose.: 86 mg/dLABO Rh Confirmatory Specimen (07.09.25 @ 01:44)   ABO Interpretation: A  Rh Interpretation: PositiveType + Screen (07.09.25 @ 01:43)   ABO Interpretation: A  Rh Interpretation: Positive  Antibody Screen: NegativeVancomycin Level, Trough (07.09.25 @ 00:53)   Vancomycin Level, Trough: 23.7: Vancomycin trough levels should be rapidly reached and maintained at   15-20 ug/mL for life threatening MRSA infections such as sepsis,   endocarditis, osteomyelitis and pneumonia.Basic Metabolic Panel - STAT (07.09.25 @ 00:53)   Sodium: 140 mmol/L  Potassium: 4.0 mmol/L  Chloride: 107 mmol/L  Carbon Dioxide: 24 mmol/L  Anion Gap: 9 mmol/L  Blood Urea Nitrogen: 14: Checked result, consistent with patient history mg/dL  Creatinine: 0.36 mg/dL  Glucose: 102 mg/dL  Calcium: 8.6 mg/dL  eGFR: 11Complete Blood Count STAT (07.09.25 @ 00:53)   Auto NRBC: 0 /100 WBCs  WBC Count: 4.58 K/uL  RBC Count: 3.51 M/uL  Hemoglobin: 11.9 g/dL  Hematocrit: 36.4 %  Mean Cell Volume: 103.7: Specimen integrity verified. fl  Mean Cell Hemoglobin: 33.9 pg  Mean Cell Hemoglobin Conc: 32.7 g/dL  Red Cell Distrib Width: 13.7 %  Platelet Count - Automated: 203 K/uL  MPV: 9.6 fL  Auto Nucleated RBC #: 0.00 K/uL    PHYSICAL EXAM:  General: ***  Eyes: EOMI intact bilaterally. Anicteric sclerae, moist conjunctivae  HENT: Moist mucous membranes  Neck: Trachea midline, supple ***  Lungs: Clear to ascultation B/L. No wheezes, rales, or rhonchi  Cardiovascular: RRR. No murmurs, rubs, or gallops  Abdomen: Soft, non-tender non-distended; No rebound or guarding  Extremities: WWP, No clubbing, cyanosis or edema  Neurological: Alert and oriented   Skin: Warm and dry. No obvious rash  Ms. Key is a 70-year-old Jamaican speaking female with a history of epilepsy, VNS placement, hypothyroidism, and Takotsubo cardiomyopathy presented to the ED with altered mental status and fever per HHA admitted for workup of acute metabolic encephalopathy.     INTERVAL HPI/OVERNIGHT EVENTS:  OVERNIGHT: Concerns from overnight team for worsening mental status. Possible concern for seizures, but holding off on re-adding the VEEG for now. Echo with normal findings re: vegetations. Concern for possible infectious source at site of implanted device. Elevated CRP with patient remaining afebrile.     SUBJECTIVE:     Vital Signs Last 24 Hrs  T(C): 36.4 (09 Jul 2025 05:52), Max: 36.4 (08 Jul 2025 20:31)  T(F): 97.6 (09 Jul 2025 05:52), Max: 97.6 (08 Jul 2025 20:31)  HR: 56 (09 Jul 2025 05:52) (56 - 66)  BP: 122/69 (09 Jul 2025 05:52) (98/64 - 122/69)  BP(mean): 86 (09 Jul 2025 05:52) (74 - 88)  RR: 15 (09 Jul 2025 05:52) (15 - 18)  SpO2: 95% (09 Jul 2025 05:52) (94% - 96%)    Parameters below as of 09 Jul 2025 05:52  Patient On (Oxygen Delivery Method): room air    POCT Blood Glucose (07.09.25 @ 09:12)   POCT Blood Glucose.: 86 mg/dLABO Rh Confirmatory Specimen (07.09.25 @ 01:44)   ABO Interpretation: A  Rh Interpretation: PositiveType + Screen (07.09.25 @ 01:43)   ABO Interpretation: A  Rh Interpretation: Positive  Antibody Screen: NegativeVancomycin Level, Trough (07.09.25 @ 00:53)   Vancomycin Level, Trough: 23.7: Vancomycin trough levels should be rapidly reached and maintained at   15-20 ug/mL for life threatening MRSA infections such as sepsis,   endocarditis, osteomyelitis and pneumonia.Basic Metabolic Panel - STAT (07.09.25 @ 00:53)   Sodium: 140 mmol/L  Potassium: 4.0 mmol/L  Chloride: 107 mmol/L  Carbon Dioxide: 24 mmol/L  Anion Gap: 9 mmol/L  Blood Urea Nitrogen: 14: Checked result, consistent with patient history mg/dL  Creatinine: 0.36 mg/dL  Glucose: 102 mg/dL  Calcium: 8.6 mg/dL  eGFR: 11Complete Blood Count STAT (07.09.25 @ 00:53)   Auto NRBC: 0 /100 WBCs  WBC Count: 4.58 K/uL  RBC Count: 3.51 M/uL  Hemoglobin: 11.9 g/dL  Hematocrit: 36.4 %  Mean Cell Volume: 103.7: Specimen integrity verified. fl  Mean Cell Hemoglobin: 33.9 pg  Mean Cell Hemoglobin Conc: 32.7 g/dL  Red Cell Distrib Width: 13.7 %  Platelet Count - Automated: 203 K/uL  MPV: 9.6 fL  Auto Nucleated RBC #: 0.00 K/uL    PHYSICAL EXAM:  General: Nods on/off to sleep  Eyes: Anicteric sclerae, moist conjunctivae  HENT: Moist mucous membranes  Neck: Trachea midline, supple ***  Lungs: Clear to ascultation B/L. No wheezes, rales, or rhonchi  Cardiovascular: RRR. No murmurs, rubs, or gallops  Abdomen: Soft, non-tender non-distended; No rebound or guarding  Extremities: WWP, No clubbing, cyanosis or edema  Neurological: Pupils (direct and indirect) BRENDEN, no response to blink to threat test, fluctuates when asked to track/heres name weather response  Skin: Warm and dry. No obvious rash  Ms. Key is a 70-year-old Serbian speaking female with a history of epilepsy, VNS placement, hypothyroidism, and Takotsubo cardiomyopathy presented to the ED with altered mental status and fever per HHA admitted for workup of acute metabolic encephalopathy.     INTERVAL HPI/OVERNIGHT EVENTS:  OVERNIGHT: Concerns from overnight team for worsening mental status. Possible concern for seizures, but holding off on re-adding the VEEG for now. Echo with normal findings re: vegetations. Concern for possible infectious source at site of implanted device. Elevated CRP with patient remaining afebrile.     SUBJECTIVE: Fluctuating mental status between response to self and sleepy-like appearance    Vital Signs Last 24 Hrs  T(C): 36.4 (09 Jul 2025 05:52), Max: 36.4 (08 Jul 2025 20:31)  T(F): 97.6 (09 Jul 2025 05:52), Max: 97.6 (08 Jul 2025 20:31)  HR: 56 (09 Jul 2025 05:52) (56 - 66)  BP: 122/69 (09 Jul 2025 05:52) (98/64 - 122/69)  BP(mean): 86 (09 Jul 2025 05:52) (74 - 88)  RR: 15 (09 Jul 2025 05:52) (15 - 18)  SpO2: 95% (09 Jul 2025 05:52) (94% - 96%)    Parameters below as of 09 Jul 2025 05:52  Patient On (Oxygen Delivery Method): room air    POCT Blood Glucose (07.09.25 @ 09:12)   POCT Blood Glucose.: 86 mg/dLABO Rh Confirmatory Specimen (07.09.25 @ 01:44)   ABO Interpretation: A  Rh Interpretation: PositiveType + Screen (07.09.25 @ 01:43)   ABO Interpretation: A  Rh Interpretation: Positive  Antibody Screen: NegativeVancomycin Level, Trough (07.09.25 @ 00:53)   Vancomycin Level, Trough: 23.7: Vancomycin trough levels should be rapidly reached and maintained at   15-20 ug/mL for life threatening MRSA infections such as sepsis,   endocarditis, osteomyelitis and pneumonia.Basic Metabolic Panel - STAT (07.09.25 @ 00:53)   Sodium: 140 mmol/L  Potassium: 4.0 mmol/L  Chloride: 107 mmol/L  Carbon Dioxide: 24 mmol/L  Anion Gap: 9 mmol/L  Blood Urea Nitrogen: 14: Checked result, consistent with patient history mg/dL  Creatinine: 0.36 mg/dL  Glucose: 102 mg/dL  Calcium: 8.6 mg/dL  eGFR: 11Complete Blood Count STAT (07.09.25 @ 00:53)   Auto NRBC: 0 /100 WBCs  WBC Count: 4.58 K/uL  RBC Count: 3.51 M/uL  Hemoglobin: 11.9 g/dL  Hematocrit: 36.4 %  Mean Cell Volume: 103.7: Specimen integrity verified. fl  Mean Cell Hemoglobin: 33.9 pg  Mean Cell Hemoglobin Conc: 32.7 g/dL  Red Cell Distrib Width: 13.7 %  Platelet Count - Automated: 203 K/uL  MPV: 9.6 fL  Auto Nucleated RBC #: 0.00 K/uL    PHYSICAL EXAM:  General: Nods on/off to sleep  Eyes: Anicteric sclerae, moist conjunctivae  HENT: Moist mucous membranes  Neck: Trachea midline, supple ***  Lungs: Clear to ascultation B/L. No wheezes, rales, or rhonchi  Cardiovascular: RRR. No murmurs, rubs, or gallops  Abdomen: Soft, non-tender non-distended; No rebound or guarding  Extremities: WWP, No clubbing, cyanosis or edema  Neurological: Pupils (direct and indirect) BRENDEN, no response to blink to threat test, fluctuates when asked to track/heres name weather response  Skin: Warm and dry. No obvious rash

## 2025-07-09 NOTE — PROGRESS NOTE ADULT - PROBLEM SELECTOR PLAN 1
Pt p/w 1 day of somnolence and declining AMS from baseline, according to HHA. On exam pt follows commands, but further ROS limited 2/2 baseline mental status.    -UA in ED negative  -CT head r/o acute intracranial causes including hydrocephalus, mass effect, or acute intracranial hemorrhage  -VEEG monitoring-collected  -epilepsy team consulted  -R/O reversible causes, F/U B12/folate, RPR.   -Clobazam and cenobamate level, Utox-pending  -BCx   -Gather med rec from [facility]  -No records per [facility], however Dr. Rowell recently prescribed AEDs Pt p/w 1 day of somnolence and declining AMS from baseline, according to HHA. On exam pt follows commands, but further ROS limited 2/2 baseline mental status. UA in ED negative. B12, Folate, RPR negative. Utox significant for   benzos i/s/o clozabam.    -Continue off VEEG per neuro  -Epilsepy team consulted  -Secondary blood cx pending  -Collateral needed from Dr. Belcher/Dr. Jeter ([facility]psy [facility]) ·  Problem: Acute metabolic encephalopathy.   ·  Plan: Pt p/w 1 day of somnolence and declining AMS from baseline, according to HHA. On exam pt follows commands, but further ROS limited 2/2 baseline mental status. UA negative in ED. CT head negative r/o acute intracranial causes including hydrocephalus, mass effect, or acute intracranial hemorrhage. Normal B12, folate. RPR negative. Utox positive for benzos (i/s/o clozabam)      Per Epilepsy Recs:  -Discontinue VEEG  -can continue with current regimen of Xcopri and Clobazam for now until med rec confirmed  -f/u Dr. Belcher and Dr. Jeter of Epilepsy clinic  - f/u clobazam and Xcopri levels  - rest per primary team. Pt p/w 1 day of somnolence and declining AMS from baseline, according to HHA. On exam pt follows commands, but further ROS limited 2/2 baseline mental status. UA negative in ED. CT head negative r/o acute intracranial causes including hydrocephalus, mass effect, or acute intracranial hemorrhage. Normal B12, folate. RPR negative. Utox positive for benzos (i/s/o clozabam)      Per Epilepsy Recs:  -Discontinue VEEG  -can continue with current regimen of Xcopri and Clobazam for now until med rec confirmed  -f/u Dr. Belcher and Dr. Jeter of Epilepsy clinic  - f/u clobazam and Xcopri levels  - rest per primary team. Pt p/w 1 day of somnolence and declining AMS from baseline, according to HHA. On exam pt follows commands, but further ROS limited 2/2 baseline mental status. UA negative in ED. CT head negative r/o acute intracranial causes including hydrocephalus, mass effect, or acute intracranial hemorrhage. Normal B12, folate. RPR negative. Utox positive for benzos (i/s/o clozabam)    Etiology possible 2/2 toxic metabolic incephalapthy given fever and + blood cx      Per Epilepsy Recs:  -Discontinue VEEG  -can continue with current regimen of Xcopri and Clobazam for now until med rec confirmed  -f/u Dr. Belcher and Dr. Jeter of Epilepsy clinic  - f/u clobazam and Xcopri levels  - rest per primary team. Pt p/w 1 day of somnolence and declining AMS from baseline, according to HHA. On exam pt follows commands, but further ROS limited 2/2 baseline mental status. UA negative in ED. CT head negative r/o acute intracranial causes including hydrocephalus, mass effect, or acute intracranial hemorrhage. Normal B12, folate. RPR negative. Utox positive for benzos (i/s/o clozabam)    Etiology possible 2/2 toxic metabolic incephalapthy given fever and + blood cx      Per Epilepsy Recs:  -Discontinue VEEG  -can continue with current regimen of Xcopri and Clobazam for now until med rec confirmed  - If seizures can do PRN ativan 1mg  -f/u Dr. Belcher and Dr. Jeter of Epilepsy clinic  - f/u clobazam and Xcopri levels  - rest per primary team.

## 2025-07-09 NOTE — PROGRESS NOTE ADULT - ASSESSMENT
PT is a 69 y/o F with PMHx of seizure disorder s/p VNS implant in left neck revised in May 2025 now hxd3 after 1.5 months of progressively worsening fever, AMS, visual disturbances and n/v of 5 days duration with initial symptoms beginning 2 days after surgical revision of VNS implant c/f surgical site infection vs encephalitis vs non-infectious etiology.  Afebrile, HDS. Remains obtunded.  Awaiting gallium scan, brain MRI wwo IVC. F/u BCx.   If LP done, please send CSF cell count, protein, glucose, CSF cx, CSF PCR, CSF HSV-1 and CSF HSV-2 PCR.  Cont Vancomycin, CTX. Check Vancomycin trough prior to 4th dose.   Will wait for Brain MRI results and hold off on IV ACV initiation.   ID Team 1 will follow.

## 2025-07-09 NOTE — SWALLOW BEDSIDE ASSESSMENT ADULT - SLP GENERAL OBSERVATIONS
Upright in bed on RA, eyes closed. Pt responsive and agreeable to evaluation. AOx1 and only verbalizing "yes" and "no".

## 2025-07-09 NOTE — PROGRESS NOTE ADULT - ATTENDING COMMENTS
70-year-old woman with epilepsy, s/p vagal nerve stimulator placement (recently revised ~ , hypothyroidism, and Takotsubo cardiomyopathy was brought to the ED by HHA with change in mental status from baseline and fever. Found to have coag negative staph bacteremia     # Metabolic encephalopathy  Per home health aide who visited yesterday,  ---- patient's mental status was back to baseline when HHA visited yesterday on 7/8/25. Today, more somnolent, arousable, follows instructions, answers questions, but visibly sleepy.  Spoke with Home health aide ([facility]) and nurse practitioner (isadora Kaufman) who has known patient for 3 years. Patient waxes and wanes a lot at home. on her best days, AAOx 2. able to answer questions. occasionally has full sentences. On some days, somnolent.   Has had multiple admissions to [facility] for similar complaints -- usually gets treated for a UTI, mental status improves and gets discharged to home when mental status improves.     # Staph epidermidis and staph hemolyticus bacteremia  --- two species, possible contaminant   On Vanc for Coag negative staph  [ ] f/u repeat blood cultures  [ ] f/u gallium scan   # Epilepsy - [ ] f/u epilepsy recs   # Urinary retention - heavy stool burden on Abd X ray likely contributory to retention. ; Tap water enema, lactulose . [ ] Trial of void at midnight     Time-based billing (NON-critical care).   52 minutes spent on total encounter. The necessity of the time spent during the encounter on this date of service was due to:     Bedside exam and interview   Reviewed vitals, labs   Discussed patient's plan of care with housestaff   Documentation of encounter  Excludes teaching time and/or separately reported services.

## 2025-07-09 NOTE — PROGRESS NOTE ADULT - PROBLEM SELECTOR PLAN 4
Per [facility] on levothyroxine 88mcg qd  TSH 0.856    -C/W home med ·  Problem: History of epilepsy.   ·  Plan: Last seen for AMS 1/22  Home meds per [facility]: Clobazam 10mg in AM, clobazam 15mg in PM, Xcopri 200mg in AM, Xcopri 50mg qhs, mirtazapine 15mg qhs  HHA not present at bedside to confirm medications    -Resume AM medications  -F/U levels as above. ·  Plan: Last seen for AMS 1/22  Home meds per [facility]: Clobazam 10mg in AM, clobazam 15mg in PM, Xcopri 200mg in AM, Xcopri 50mg qhs, mirtazapine 15mg qhs  HHA not present at bedside to confirm medications    -Resume AM medications  -F/U levels as above.

## 2025-07-09 NOTE — PROGRESS NOTE ADULT - SUBJECTIVE AND OBJECTIVE BOX
Neurology Progress Note    INTERVAL HOSPITAL COURSE / OVERNIGHT EVENTS: No acute events.  Patient seen and examined at bedside. She is very lethargic and not responsive to verbal commands this morning. Unable to provide significant ROS.    <<<<<PAST MEDICAL & SURGICAL HISTORY>>>>>  Chronic UTI (urinary tract infection)    Epilepsy    ALLERGIES  penicillin (Anaphylaxis)  aspirin (Other)    Home Medications:  ATORVASTATIN  10MG TABS: TAKE 1 TABLET BY MOUTH DAILY (07 Jul 2025 12:57)  cloBAZam 10 mg oral tablet: 1 tab(s) orally once a day in AM (07 Jul 2025 04:17)  cloBAZam 5 mg oral tablet: 3 tab(s) orally once a day in PM (07 Jul 2025 04:17)  Crestor 5 mg oral tablet: 1 tab(s) orally once a day (07 Jul 2025 04:17)  FAMOTIDINE 10 MG ORAL TABLET: TAKE 1 TABLET (10 MG) BY MOUTH EVERY 12 HOURS AS NEEDED (07 Jul 2025 12:38)  FOLIC ACID   TAB 1MG:  (07 Jul 2025 12:36)  LEVOTHYROXIN TAB 88MCG:  (07 Jul 2025 12:36)  METHENAM HIP TAB 1GM:  (07 Jul 2025 12:30)  methenamine hippurate 1 g oral tablet: 1 tab(s) orally 2 times a day (07 Jul 2025 12:33)  MULTIVITAMIN  TABLET   [facility]: TAKE 1 TABLET  BY MOUTH DAILY (07 Jul 2025 12:36)  SENNA 8.6 MG ORAL TABLET  2024: TAKE 2 TABLETS (17.2 MG) BY MOUTH TWICE DAILY (07 Jul 2025 12:38)  thiamine 100 mg oral tablet: 1 tab(s) orally once a day (07 Jul 2025 04:17)  Vitamin B-12 1000 mcg oral tablet: 1 tab(s) orally once a day (07 Jul 2025 04:17)  VITAMIN B-12 1000 MCG TABS  22: TAKE 1 TABLET BY MOUTH EVERY OTHER DAY (07 Jul 2025 12:38)  XCOPRI       TAB 200MG:  (07 Jul 2025 12:36)  XCOPRI       TAB 50MG:  (07 Jul 2025 12:38)  Xcopri 200 mg oral tablet: 1 tab(s) orally once a day in the morning (07 Jul 2025 04:17)  Xcopri 50 mg oral tablet: 1 tab(s) orally once a day in the evening (07 Jul 2025 04:17)    MEDICATIONS  STANDING MEDICATIONS  artificial tears (preservative free) Ophthalmic Solution 1 Drop(s) Both EYES daily  bisacodyl 5 milliGRAM(s) Oral at bedtime  cenobamate 200 milliGRAM(s) Oral every 24 hours  cenobamate 50 milliGRAM(s) Oral at bedtime  cloBAZam 10 milliGRAM(s) Oral every 24 hours  enoxaparin Injectable 40 milliGRAM(s) SubCutaneous every 24 hours  famotidine    Tablet 20 milliGRAM(s) Oral daily  folic acid 1 milliGRAM(s) Oral daily  levothyroxine 88 MICROGram(s) Oral every 24 hours  multivitamin/minerals 1 Tablet(s) Oral daily  polyethylene glycol 3350 17 Gram(s) Oral every 12 hours  rosuvastatin 5 milliGRAM(s) Oral at bedtime  senna 2 Tablet(s) Oral at bedtime  thiamine 100 milliGRAM(s) Oral every 24 hours  thiamine IVPB 500 milliGRAM(s) IV Intermittent every 8 hours  vancomycin  IVPB 1000 milliGRAM(s) IV Intermittent once    PRN MEDICATIONS  LORazepam   Injectable 1 milliGRAM(s) IV Push once PRN    VITALS:  T(F): 97.6  HR: 55  BP: 115/61  RR: 18  SpO2: 96%    <<<<<NEURO EXAM>>>>>  MENTAL STATUS: Oriented to name only, lethargic appearing; inaudible mumbling with impaired comprehension  LANG/SPEECH: Speech slurred  CRANIAL NERVES:  Eyes: Pupils dilated but equal and reactive, unable to test visual fields given since pt unable to comprehend; EOM intact, tracks around the room  Face: decreased nasolabial folds on the left side  ENT: unable to perform due to comprehension deficits  MOTOR: intact grasp strength; unable to perform strength testing; b/l foot drop  REFLEXES: 1+ in b/l LEs, 2+ reflexes in b/l UEs  SENSORY: unable to test due to limited comprehension  COORD: unable to assess  Gait: Deferred    <<<<<LABS>>>>>                        11.9   4.58  )-----------( 203      ( 09 Jul 2025 00:53 )             36.4     07-09    140  |  107  |  14  ----------------------------<  102[H]  4.0   |  24  |  0.36[L]    Ca    8.6      09 Jul 2025 00:53  Phos  3.3     07-08  Mg     1.9     07-08    TPro  6.8  /  Alb  3.1[L]  /  TBili  0.2  /  DBili  x   /  AST  See Note  /  ALT  See Note  /  AlkPhos  See Note  07-08    Urinalysis Basic - ( 09 Jul 2025 00:53 )    Color: x / Appearance: x / SG: x / pH: x  Gluc: 102 mg/dL / Ketone: x  / Bili: x / Urobili: x   Blood: x / Protein: x / Nitrite: x   Leuk Esterase: x / RBC: x / WBC x   Sq Epi: x / Non Sq Epi: x / Bacteria: x    Culture - Blood (collected 07 Jul 2025 16:00)  Source: Blood Blood-Peripheral  Preliminary Report (09 Jul 2025 01:02):    No growth at 24 hours    Culture - Blood (collected 07 Jul 2025 16:00)  Source: Blood Blood-Peripheral  Preliminary Report (09 Jul 2025 01:02):    No growth at 24 hours    Urinalysis with Rflx Culture (collected 06 Jul 2025 17:13)    Culture - Blood (collected 06 Jul 2025 17:13)  Source: Blood Blood-Peripheral  Gram Stain (07 Jul 2025 18:18):    Growth in aerobic bottle: Gram Positive Cocci in Clusters    Growth in anaerobic bottle: Gram Positive Cocci in Clusters  Final Report (08 Jul 2025 23:38):    Growth in anaerobic bottle: Staphylococcus haemolyticus    Growth in aerobic bottle: Staphylococcus epidermidis    Isolation of Coagulase negative Staphylococcus from single blood culture    sets may represent    contamination. Contact the Microbiology Department at [phone] if    susceptibility testing is needed.    clinically indicated.    Direct identification is available within approximately 3-5    hours either by Blood Panel Multiplexed PCR or Direct    MALDI-TOF. Details: [email or web address]  Organism: Blood Culture PCR (08 Jul 2025 23:38)  Organism: Blood Culture PCR (08 Jul 2025 23:38)    Culture - Blood (collected 06 Jul 2025 17:13)  Source: Blood Blood-Peripheral  Gram Stain (07 Jul 2025 22:22):    Growth in aerobic bottle: Gram Positive Cocci in Clusters    Growth in anaerobic bottle: Gram Positive Cocci in Clusters  Preliminary Report (08 Jul 2025 15:50):    Growth in aerobic bottle: Staphylococcus haemolyticus    Growth in anaerobic bottle: Staphylococcus epidermidis  Organism: Staphylococcus haemolyticus (09 Jul 2025 13:26)  Organism: Staphylococcus haemolyticus (09 Jul 2025 13:26)    [id number]        <<<<<RADIOLOGY>>>>>        -----------------------------------------------------------------------------------------------------------------------------------------------------------------------------------------------

## 2025-07-09 NOTE — PROGRESS NOTE ADULT - PROBLEM SELECTOR PLAN 1
MEDICAL STUDENT NOTE PENDING ID TEAM REVIEW: Given close proximity of VNS revision date to symptom onset, negative w/u for alternative sources, AMS is likely 2/2 to surgical site infection. Recent lab notable for Vanc trough of 23.7 following vanc dosing of 1.25g q12. Ddx includes subacute presentation of HSV encephalitis given recent VEEG showing R temporal lobe hyperactivity.    - 1g Vancomycin q12  - check vanc trough before next dose  - 2g Ceftriaxone q24   - MRI w/ gadolinium of shoulder vs LP w cell count/glucose/protein/culture/PCR for HSV 1+2 vs MRI head    Thanks for this interesting consult. ID will continue to follow as PTs clinical course evolves. Given close proximity of VNS revision date to symptom onset, negative w/u for alternative sources, AMS is likely 2/2 to surgical site infection. Recent lab notable for Vanc trough of 23.7 following vanc dosing of 1.25g q12. Ddx includes subacute presentation of HSV encephalitis given recent VEEG showing R temporal lobe hyperactivity.    Afebrile, HDS. Remains obtunded.  Awaiting gallium scan, brain MRI wwo IVC. F/u BCx.   If LP done, please send CSF cell count, protein, glucose, CSF cx, CSF PCR, CSF HSV-1 and CSF HSV-2 PCR.  Cont Vancomycin, CTX. Check Vancomycin trough prior to 4th dose.   Will wait for Brain MRI results and hold off on IV ACV initiation.     ID Team 1 will follow.

## 2025-07-09 NOTE — PROGRESS NOTE ADULT - ASSESSMENT
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 Epilepsy consulted for guidance iso AMS and established medical history of epilepsy. Patient more alert today on exam and able to answer questions. vEEG unremarkable for seizure activity. Given history and presentation, as well positive blood cultures, suspect symptoms likely 2/2 to underlying toxic metabolic encephelopathy.     Recommendations:  - obtain MRI Brain w/ and w/o contrast (ensure VNS is MRI compatible; if compatible, it will need to be shut off prior to MRI and then resumed immediately after) - can call neurology or epilepsy for assistance with turning off VNS when pt ready for MRI  - Neurosurgery consult given concern for surgical site infection  - Continue with Xcopri 250 mg qd and Clobazam 10 mg qd (med rec confirmed-see chart note)  - f/u clobazam and Xcopri levels  - please obtain collateral from outpatient neurologist Dr. Belcher and neurosurgeon who placed the VNS  - f/u ID recs for source control and management of possible infection  - rest per primary team    *Case discussed with Dr. 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 Epilepsy consulted for guidance iso AMS and established medical history of epilepsy. Patient more alert today on exam and able to answer questions. vEEG unremarkable for seizure activity. Given history and presentation, as well positive blood cultures, suspect symptoms likely 2/2 to underlying toxic metabolic encephelopathy.     Recommendations:  - f/u gallium scan (based on what that shows, can consider further diagnostic tests)  - Neurosurgery consult given concern for surgical site infection  - Continue with Xcopri 250 mg qd and Clobazam 10 mg qd (med rec confirmed-see chart note)  - f/u clobazam and Xcopri levels  - please obtain collateral from outpatient neurologist Dr. Belcher  - f/u ID recs for source control and management of possible infection  - rest per primary team    *Case discussed with Dr. 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 Epilepsy consulted for guidance iso AMS and established medical history of epilepsy. Patient more alert today on exam and able to answer questions. vEEG unremarkable for seizure activity. Given history and presentation, as well positive blood cultures, suspect symptoms likely 2/2 to underlying toxic metabolic encephelopathy.     Recommendations:  - f/u gallium scan (based on what that shows, can consider further diagnostic tests)  - Neurosurgery consult given concern for surgical site infection  - Continue with Xcopri 250 mg qd and Clobazam 10 mg qd (med rec confirmed-see chart note)  - f/u clobazam and Xcopri levels  - f/u ID recs for source control and management of possible infection  - rest per primary team    *Case discussed with Dr. Arguello

## 2025-07-09 NOTE — SWALLOW BEDSIDE ASSESSMENT ADULT - SLP PERTINENT HISTORY OF CURRENT PROBLEM
A 70-year-old female with a history of epilepsy, VNS placement, hypothyroidism, and Takotsubo cardiomyopathy was brought to the ED by HHA with altered mental status and fever. Pt is currently afebrile and admitted to [facility] for further evaluation and management.

## 2025-07-09 NOTE — SWALLOW BEDSIDE ASSESSMENT ADULT - ORAL PHASE
Pt with periods of decreased acceptance. Adequate labial seal and coordination to suck in through straw. No oral residue appreciated after puree. Solids held due to pt declining additional PO.

## 2025-07-09 NOTE — PROGRESS NOTE ADULT - ASSESSMENT
A 70-year-old female with a history of epilepsy, VNS placement, hypothyroidism, and Takotsubo cardiomyopathy was brought to the ED by HHA with altered mental status and fever. Pt is currently afebrile and admitted to [facility] for further evaluation and managment.     A 70-year-old female with a history of epilepsy, VNS placement, hypothyroidism, and Takotsubo cardiomyopathy was brought to the ED by HHA with altered mental status and fever. Pt admitted to [facility] for further evaluation and managment with vEEG and ABx. Currently afebrile but course c/b toxic metabolic encephalopathy

## 2025-07-09 NOTE — SWALLOW BEDSIDE ASSESSMENT ADULT - PHARYNGEAL PHASE
Hyolaryngeal movement upon palpation, no overt s/s concerning for aspiration or changes in work of breathing across all PO intake, although cannot rule out silent aspiration at bedside.

## 2025-07-09 NOTE — PROGRESS NOTE ADULT - PROBLEM SELECTOR PLAN 3
Last seen for AMS 1/22  Home meds per sure scripts: Clobazam 10mg in AM, clobazam 15mg in PM, Xcopri 200mg in AM, Xcopri 50mg qhs, mirtazapine 15mg qhs  HHA not present at bedside to confirm medications    -Resume AM medications  -F/U levels as above Problem/Plan - 3:  ·  Problem: Fever.   ·  Plan: In ED met 1/4 SIRS criteria (fever)  WBC of 7.5, UA negative, RVP negative, CTH negative  CTA with no PE. Extensive soft tissue swelling throughout the right shoulder region, visualized right upper extremity, and anterior upper right chest wall. This has increased since the CT of the abdomen pelvis from 6/26/2025. Several gas foci along the medial aspect of the upper arm. Gas foci within the right medial arm were also visualized on prior CT. No organized fluid collection is imaged.  On exam: RUE without signs of infections, appears to be IV insertion attempt to R arm. No crepitus, erythema, induration noted. NVI in distal hand. No neck stiffness/photophobia. Pupils 5-6mm B/L and reactive    -Continue vancomycin for Staph Epi (pending susceptibilities)  -CRP, Pro Kwabena  -CTM fever curve  -RUE US  -F/u susceptibilities  -Repeat Blood Cultures  -MRI w/ galTavon. In ED met 1/4 SIRS criteria (fever)  WBC of 7.5, UA negative, RVP negative, CTH negative  CTA with no PE. Extensive soft tissue swelling throughout the right shoulder region, visualized right upper extremity, and anterior upper right chest wall. This has increased since the CT of the abdomen pelvis from 6/26/2025. Several gas foci along the medial aspect of the upper arm. Gas foci within the right medial arm were also visualized on prior CT. No organized fluid collection is imaged.  On exam: RUE without signs of infections, appears to be IV insertion attempt to R arm. No crepitus, erythema, induration noted. NVI in distal hand. No neck stiffness/photophobia. Pupils 5-6mm B/L and reactive    -Continue vancomycin for Staph Epi (pending susceptibilities)  -Hold CTX (rec per ID but patient with PCN anaphylaxis-Vanc will cover staph epi for now)  -MRI w/ galolinium of LUE.  -CTM fever curve  -F/u susceptibilities  -F/u blood cxs

## 2025-07-09 NOTE — PROGRESS NOTE ADULT - PROBLEM SELECTOR PLAN 2
In ED met 1/4 SIRS criteria (fever)  WBC of 7.5, UA negative, RVP negative, CTH negative  CTA with no PE. Extensive soft tissue swelling throughout the right shoulder region, visualized right upper extremity, and anterior upper right chest wall. This has increased since the CT of the abdomen pelvis from 6/26/2025. Several gas foci along the medial aspect of the upper arm. Gas foci within the right medial arm were also visualized on prior CT. No organized fluid collection is imaged.  On exam: RUE without signs of infections, appears to be IV insertion attempt to R arm. No crepitus, erythema, induration noted. NVI in distal hand. No neck stiffness/photophobia. Pupils 5-6mm B/L and reactive    -d/c abx  -RUE US  -CTM fever curve  -Monitor for bladder distension  -Bladder scan [id number]-[id number] cath if residual >330cc and patient does not urinate within 1 hr of B/S; consider Vaz if requires straight cath 3 or more times  -f/u BCx Met 1/4 SIRS criteria in the ED (fever), WBC of 7.5, UA negative, RVP negative. CTA with no PE. Soft tissue swelling noted in right upper extremity.   On exam: RUE without signs of infections, appears to be IV insertion attempt to R arm. No crepitus, erythema, induration noted. NVI in distal hand. No neck stiffness/photophobia. Pupils 5-6mm B/L and reactive.  RUE US negative.     -Continue the Vancomycin abx (Van trough 7/9 at 3am elevated)  -Hold off on the CTX  -CTM fever curve  -Monitor for bladder distension  -Bladder scan [id number] cath if residual >330cc and patient does not urinate within 1 hr of B/S; consider Vaz if requires straight cath 3 or more times  -f/u BCx ·  Problem: Urinary retention.   ·  Plan: AXR with stool burden.  BS 400s -> patient received SC       -Tap water enema  -Golytely  -Continue with home Senna  -Add Miralax to bowel regimen  -Bladder scan [id number]-[id number] cath if residual >330cc and patient does not urinate within 1 hr of B/S; consider Wilson if requires straight cath 3 or more times  -CTM bowel movements and urine output, if patient still retaining after stool cleared consider wilson. AXR with stool burden.  BS 400s -> patient received SC       -Switch from tap water to mineral oil enema  -Continue with Senna, Miralax  -Resume bladder scans Q6h: if residual >330cc and patient does not urinate within 1 hr of B/S; consider Wilson if requires straight cath 3 or more times  -CTM bowel movements and urine output, if patient still retaining after stool cleared consider wilson.

## 2025-07-10 ENCOUNTER — TRANSCRIPTION ENCOUNTER (OUTPATIENT)
Age: 70
End: 2025-07-10

## 2025-07-10 VITALS
RESPIRATION RATE: 18 BRPM | TEMPERATURE: 98 F | DIASTOLIC BLOOD PRESSURE: 76 MMHG | OXYGEN SATURATION: 99 % | SYSTOLIC BLOOD PRESSURE: 136 MMHG | HEART RATE: 56 BPM

## 2025-07-10 LAB
CULTURE RESULTS: ABNORMAL
ORGANISM # SPEC MICROSCOPIC CNT: ABNORMAL
ORGANISM # SPEC MICROSCOPIC CNT: ABNORMAL
ORGANISM # SPEC MICROSCOPIC CNT: SIGNIFICANT CHANGE UP
SPECIMEN SOURCE: SIGNIFICANT CHANGE UP

## 2025-07-10 PROCEDURE — 85025 COMPLETE CBC W/AUTO DIFF WBC: CPT

## 2025-07-10 PROCEDURE — 87637 SARSCOV2&INF A&B&RSV AMP PRB: CPT

## 2025-07-10 PROCEDURE — 99232 SBSQ HOSP IP/OBS MODERATE 35: CPT

## 2025-07-10 PROCEDURE — 93010 ELECTROCARDIOGRAM REPORT: CPT

## 2025-07-10 PROCEDURE — 82962 GLUCOSE BLOOD TEST: CPT

## 2025-07-10 PROCEDURE — 83735 ASSAY OF MAGNESIUM: CPT

## 2025-07-10 PROCEDURE — 86140 C-REACTIVE PROTEIN: CPT

## 2025-07-10 PROCEDURE — 80339 ANTIEPILEPTICS NOS 1-3: CPT

## 2025-07-10 PROCEDURE — 87186 SC STD MICRODIL/AGAR DIL: CPT

## 2025-07-10 PROCEDURE — 82570 ASSAY OF URINE CREATININE: CPT

## 2025-07-10 PROCEDURE — 80053 COMPREHEN METABOLIC PANEL: CPT

## 2025-07-10 PROCEDURE — A9556: CPT

## 2025-07-10 PROCEDURE — 76882 US LMTD JT/FCL EVL NVASC XTR: CPT

## 2025-07-10 PROCEDURE — 86780 TREPONEMA PALLIDUM: CPT

## 2025-07-10 PROCEDURE — 82272 OCCULT BLD FECES 1-3 TESTS: CPT

## 2025-07-10 PROCEDURE — 95708 EEG WO VID EA 12-26HR UNMNTR: CPT

## 2025-07-10 PROCEDURE — 82550 ASSAY OF CK (CPK): CPT

## 2025-07-10 PROCEDURE — 84145 PROCALCITONIN (PCT): CPT

## 2025-07-10 PROCEDURE — 83036 HEMOGLOBIN GLYCOSYLATED A1C: CPT

## 2025-07-10 PROCEDURE — 83930 ASSAY OF BLOOD OSMOLALITY: CPT

## 2025-07-10 PROCEDURE — 78802 RP LOCLZJ TUM WHBDY 1 D IMG: CPT | Mod: 26

## 2025-07-10 PROCEDURE — 84540 ASSAY OF URINE/UREA-N: CPT

## 2025-07-10 PROCEDURE — 85730 THROMBOPLASTIN TIME PARTIAL: CPT

## 2025-07-10 PROCEDURE — 74018 RADEX ABDOMEN 1 VIEW: CPT

## 2025-07-10 PROCEDURE — 93306 TTE W/DOPPLER COMPLETE: CPT

## 2025-07-10 PROCEDURE — 84100 ASSAY OF PHOSPHORUS: CPT

## 2025-07-10 PROCEDURE — 86901 BLOOD TYPING SEROLOGIC RH(D): CPT

## 2025-07-10 PROCEDURE — 80061 LIPID PANEL: CPT

## 2025-07-10 PROCEDURE — 84300 ASSAY OF URINE SODIUM: CPT

## 2025-07-10 PROCEDURE — 84484 ASSAY OF TROPONIN QUANT: CPT

## 2025-07-10 PROCEDURE — 78830 RP LOCLZJ TUM SPECT W/CT 1: CPT

## 2025-07-10 PROCEDURE — 78830 RP LOCLZJ TUM SPECT W/CT 1: CPT | Mod: 26

## 2025-07-10 PROCEDURE — 82746 ASSAY OF FOLIC ACID SERUM: CPT

## 2025-07-10 PROCEDURE — 83935 ASSAY OF URINE OSMOLALITY: CPT

## 2025-07-10 PROCEDURE — 71275 CT ANGIOGRAPHY CHEST: CPT

## 2025-07-10 PROCEDURE — 80307 DRUG TEST PRSMV CHEM ANLYZR: CPT

## 2025-07-10 PROCEDURE — 80202 ASSAY OF VANCOMYCIN: CPT

## 2025-07-10 PROCEDURE — 87077 CULTURE AEROBIC IDENTIFY: CPT

## 2025-07-10 PROCEDURE — 85610 PROTHROMBIN TIME: CPT

## 2025-07-10 PROCEDURE — 36415 COLL VENOUS BLD VENIPUNCTURE: CPT

## 2025-07-10 PROCEDURE — 96365 THER/PROPH/DIAG IV INF INIT: CPT

## 2025-07-10 PROCEDURE — 84443 ASSAY THYROID STIM HORMONE: CPT

## 2025-07-10 PROCEDURE — 82607 VITAMIN B-12: CPT

## 2025-07-10 PROCEDURE — 74177 CT ABD & PELVIS W/CONTRAST: CPT | Mod: 26

## 2025-07-10 PROCEDURE — 85027 COMPLETE CBC AUTOMATED: CPT

## 2025-07-10 PROCEDURE — 95705 EEG W/O VID 2-12 HR UNMNTR: CPT

## 2025-07-10 PROCEDURE — 0225U NFCT DS DNA&RNA 21 SARSCOV2: CPT

## 2025-07-10 PROCEDURE — 86900 BLOOD TYPING SEROLOGIC ABO: CPT

## 2025-07-10 PROCEDURE — 87150 DNA/RNA AMPLIFIED PROBE: CPT

## 2025-07-10 PROCEDURE — 83880 ASSAY OF NATRIURETIC PEPTIDE: CPT

## 2025-07-10 PROCEDURE — 96368 THER/DIAG CONCURRENT INF: CPT

## 2025-07-10 PROCEDURE — 93005 ELECTROCARDIOGRAM TRACING: CPT

## 2025-07-10 PROCEDURE — 70450 CT HEAD/BRAIN W/O DYE: CPT

## 2025-07-10 PROCEDURE — 71045 X-RAY EXAM CHEST 1 VIEW: CPT

## 2025-07-10 PROCEDURE — 84156 ASSAY OF PROTEIN URINE: CPT

## 2025-07-10 PROCEDURE — 87040 BLOOD CULTURE FOR BACTERIA: CPT

## 2025-07-10 PROCEDURE — 78802 RP LOCLZJ TUM WHBDY 1 D IMG: CPT

## 2025-07-10 PROCEDURE — 83605 ASSAY OF LACTIC ACID: CPT

## 2025-07-10 PROCEDURE — 82553 CREATINE MB FRACTION: CPT

## 2025-07-10 PROCEDURE — 99285 EMERGENCY DEPT VISIT HI MDM: CPT | Mod: 25

## 2025-07-10 PROCEDURE — 80048 BASIC METABOLIC PNL TOTAL CA: CPT

## 2025-07-10 PROCEDURE — 81001 URINALYSIS AUTO W/SCOPE: CPT

## 2025-07-10 PROCEDURE — 81003 URINALYSIS AUTO W/O SCOPE: CPT

## 2025-07-10 PROCEDURE — 95700 EEG CONT REC W/VID EEG TECH: CPT

## 2025-07-10 PROCEDURE — 74177 CT ABD & PELVIS W/CONTRAST: CPT

## 2025-07-10 PROCEDURE — 99233 SBSQ HOSP IP/OBS HIGH 50: CPT | Mod: GC

## 2025-07-10 PROCEDURE — 80299 QUANTITATIVE ASSAY DRUG: CPT

## 2025-07-10 PROCEDURE — 86850 RBC ANTIBODY SCREEN: CPT

## 2025-07-10 PROCEDURE — 92610 EVALUATE SWALLOWING FUNCTION: CPT

## 2025-07-10 PROCEDURE — G0545: CPT

## 2025-07-10 PROCEDURE — 96366 THER/PROPH/DIAG IV INF ADDON: CPT

## 2025-07-10 RX ORDER — LEVOTHYROXINE SODIUM 300 MCG
0 TABLET ORAL
Qty: 0 | Refills: 0 | DISCHARGE

## 2025-07-10 RX ORDER — ATORVASTATIN CALCIUM 80 MG/1
1 TABLET, FILM COATED ORAL
Qty: 0 | Refills: 0 | DISCHARGE
Start: 2025-07-10

## 2025-07-10 RX ORDER — CEFPODOXIME PROXETIL 200 MG/1
200 TABLET, FILM COATED ORAL EVERY 12 HOURS
Refills: 0 | Status: DISCONTINUED | OUTPATIENT
Start: 2025-07-10 | End: 2025-07-10

## 2025-07-10 RX ORDER — SENNA 187 MG
2 TABLET ORAL
Qty: 60 | Refills: 0
Start: 2025-07-10 | End: 2025-08-08

## 2025-07-10 RX ORDER — CYST/ALA/Q10/PHOS.SER/DHA/BROC 100-20-50
1 POWDER (GRAM) ORAL
Qty: 0 | Refills: 0 | DISCHARGE
Start: 2025-07-10

## 2025-07-10 RX ORDER — FOLIC ACID 1 MG/1
1 TABLET ORAL
Qty: 0 | Refills: 0 | DISCHARGE
Start: 2025-07-10

## 2025-07-10 RX ORDER — LEVOTHYROXINE SODIUM 300 MCG
1 TABLET ORAL
Qty: 0 | Refills: 0 | DISCHARGE
Start: 2025-07-10

## 2025-07-10 RX ORDER — CENOBAMATE 150-200 MG
0 KIT ORAL
Qty: 0 | Refills: 0 | DISCHARGE

## 2025-07-10 RX ORDER — CENOBAMATE 150-200 MG
1 KIT ORAL
Qty: 0 | Refills: 0 | DISCHARGE
Start: 2025-07-10

## 2025-07-10 RX ORDER — ATORVASTATIN CALCIUM 80 MG/1
0 TABLET, FILM COATED ORAL
Qty: 0 | Refills: 4 | DISCHARGE

## 2025-07-10 RX ORDER — POLYETHYLENE GLYCOL 3350 17 G/17G
17 POWDER, FOR SOLUTION ORAL
Qty: 1 | Refills: 0
Start: 2025-07-10 | End: 2025-08-08

## 2025-07-10 RX ORDER — CEFPODOXIME PROXETIL 200 MG/1
1 TABLET, FILM COATED ORAL
Qty: 10 | Refills: 0
Start: 2025-07-10 | End: 2025-07-14

## 2025-07-10 RX ORDER — SENNA 187 MG
0 TABLET ORAL
Qty: 0 | Refills: 1 | DISCHARGE

## 2025-07-10 RX ORDER — CYANOCOBALAMIN 1000 UG/ML
0 INJECTION INTRAMUSCULAR; SUBCUTANEOUS
Qty: 0 | Refills: 5 | DISCHARGE

## 2025-07-10 RX ORDER — CEFPODOXIME PROXETIL 200 MG/1
1 TABLET, FILM COATED ORAL
Qty: 0 | Refills: 0 | DISCHARGE
Start: 2025-07-10

## 2025-07-10 RX ORDER — FOLIC ACID 1 MG/1
0 TABLET ORAL
Qty: 0 | Refills: 0 | DISCHARGE

## 2025-07-10 RX ORDER — CEFPODOXIME PROXETIL 200 MG/1
1 TABLET, FILM COATED ORAL
Refills: 0
Start: 2025-07-10

## 2025-07-10 RX ORDER — METHENAMINE MANDELATE 1 G
0 TABLET ORAL
Qty: 0 | Refills: 0 | DISCHARGE

## 2025-07-10 RX ADMIN — Medication 1 TABLET(S): at 12:59

## 2025-07-10 RX ADMIN — Medication 105 MILLIGRAM(S): at 14:18

## 2025-07-10 RX ADMIN — CEFPODOXIME PROXETIL 200 MILLIGRAM(S): 200 TABLET, FILM COATED ORAL at 13:02

## 2025-07-10 RX ADMIN — CENOBAMATE 200 MILLIGRAM(S): KIT ORAL at 06:10

## 2025-07-10 RX ADMIN — LACTULOSE 10 GRAM(S): 10 SOLUTION ORAL at 00:11

## 2025-07-10 RX ADMIN — FOLIC ACID 1 MILLIGRAM(S): 1 TABLET ORAL at 13:00

## 2025-07-10 RX ADMIN — Medication 100 MILLIGRAM(S): at 05:54

## 2025-07-10 RX ADMIN — Medication 1 DROP(S): at 13:00

## 2025-07-10 RX ADMIN — Medication 88 MICROGRAM(S): at 05:54

## 2025-07-10 RX ADMIN — POLYETHYLENE GLYCOL 3350 17 GRAM(S): 17 POWDER, FOR SOLUTION ORAL at 05:54

## 2025-07-10 RX ADMIN — Medication 20 MILLIGRAM(S): at 12:59

## 2025-07-10 RX ADMIN — Medication 105 MILLIGRAM(S): at 05:54

## 2025-07-10 NOTE — PROGRESS NOTE ADULT - PROVIDER SPECIALTY LIST ADULT
Epilepsy
Infectious Disease
Epilepsy
Epilepsy
Infectious Disease
Internal Medicine

## 2025-07-10 NOTE — PROGRESS NOTE ADULT - ATTENDING COMMENTS
70-year-old woman with epilepsy, s/p vagal nerve stimulator placement (recently revised ~ 1 month ago) , hypothyroidism, and Takotsubo cardiomyopathy was brought to the ED by HHA with change in mental status from baseline and fever. Found to have coag negative staph bacteremia (2 different organisms, subsequent Blood cultures negative), thought to be contaminant.     # Metabolic encephalopathy  On 7.10.25, spoke with Home health aide (Ananya) and nurse practitioner (Philomena Kaufman) who has known patient for 3 years. Patient waxes and wanes a lot at home. on her best days, AAOx 2. able to answer questions. occasionally has full sentences. On some days, somnolent.   Has had multiple admissions to [facility] for similar complaints -- usually gets treated for a UTI, mental status improves and gets discharged to home when mental status improves.   Mental status as of 7/10 at baseline - oriented to person, 'hospital', '[place]'. Thinks the year is '22'. Smiling, laughing, in good spirits. No new complaints, no pain no discomfort on interview or exam. Says she would be happy to return home today.   Confusion on admission likely 2/2 LLL PNA, which improved with empiric abx.  Given return of mental status to baseline , fixed neurologic lesion or brain infection unlikely. Brain MRI no longer indicated.    # likely gram positive Pneumonia -   Gallium scan, SPECT suggestive of LLL PNA. Likely gram positive given response to empiric vancomycin  [ ] discussed with ID, completing course of abx with 5 days of cefpodoxime 200mg BID     # Coag negative staph bacteremia    Was started on Vanc empirically for Coag negative staph given presence of vagal nerve stimulator.   Gallium scan without signs of infection around vagal nerve stimulator   Blood cultures speciated to Staph epidermidis and staph hemolyticus ---discussed with ID. Two species, likely contaminant     # Epilepsy - Discussed with epilepsy consult attd who had spoken with outpatient neurologist. Epilepsy well controlled per outpatient neurologist. EEG here negative  # Urinary retention - heavy stool burden on Abd X ray likely contributory to retention. ; Tap water enema, lactulose; passed trial of void on day of discharge.

## 2025-07-10 NOTE — DISCHARGE NOTE NURSING/CASE MANAGEMENT/SOCIAL WORK - FINANCIAL ASSISTANCE
[facility] provides services at a reduced cost to those who are determined to be eligible through [facility]’s financial assistance program. Information regarding [facility]’s financial assistance program can be found by going to [email or web address] or by calling [phone].

## 2025-07-10 NOTE — PROGRESS NOTE ADULT - PROBLEM SELECTOR PLAN 1
Recent scan suggestive of LLL pneumonia.     cefpodoxime 1g q8hr  Hold vanc, ceftriaxone.    ID Team 1 will follow.

## 2025-07-10 NOTE — PROGRESS NOTE ADULT - TIME BILLING
coordination of care
PNA, positive BCx, fever
Bedside exam and interview   Reviewed vitals, labs   Discussed patient's plan of care with housestaff   Documentation of encounter  Excludes teaching time and/or separately reported services.
Bedside exam and interview   Reviewed vitals, labs   Discussed patient's plan of care with housestaff   Documentation of encounter  Excludes teaching time and/or separately reported services

## 2025-07-10 NOTE — PROGRESS NOTE ADULT - PROBLEM SELECTOR PLAN 4
·  Plan: Last seen for AMS 1/22  Home meds per sure scripts: Clobazam 10mg in AM, clobazam 15mg in PM, Xcopri 200mg in AM, Xcopri 50mg qhs, mirtazapine 15mg qhs  HHA not present at bedside to confirm medications    -Resume AM medications  -F/U levels as above. ·  Plan: Last seen for AMS 1/22  Home meds per sure scripts: Clobazam 10mg in AM, clobazam 15mg in PM, Xcopri 200mg in AM, Xcopri 50mg qhs, mirtazapine 15mg qhs  HHA not present at bedside to confirm medications    -C/w home meds (see above)

## 2025-07-10 NOTE — PROGRESS NOTE ADULT - SUBJECTIVE AND OBJECTIVE BOX
----------Daily Progress Note----------    Neurology Progress Note    INTERVAL HOSPITAL COURSE / OVERNIGHT EVENTS: Vaz removed  Patient seen and examined at bedside. More alert today and able to answer most questions in both B[place]an and English. She denies any pain or acute concerning symptoms.    <<<<<PAST MEDICAL & SURGICAL HISTORY>>>>>  Chronic UTI (urinary tract infection)    Epilepsy    ALLERGIES  penicillin (Anaphylaxis)  aspirin (Other)    Home Medications:  ATORVASTATIN  10MG TABS: TAKE 1 TABLET BY MOUTH DAILY (07 Jul 2025 12:57)  cloBAZam 10 mg oral tablet: 1 tab(s) orally once a day in AM (07 Jul 2025 04:17)  cloBAZam 5 mg oral tablet: 3 tab(s) orally once a day in PM (07 Jul 2025 04:17)  Crestor 5 mg oral tablet: 1 tab(s) orally once a day (07 Jul 2025 04:17)  FAMOTIDINE 10 MG ORAL TABLET: TAKE 1 TABLET (10 MG) BY MOUTH EVERY 12 HOURS AS NEEDED (07 Jul 2025 12:38)  FOLIC ACID   TAB 1MG:  (07 Jul 2025 12:36)  LEVOTHYROXIN TAB 88MCG:  (07 Jul 2025 12:36)  METHENAM HIP TAB 1GM:  (07 Jul 2025 12:30)  methenamine hippurate 1 g oral tablet: 1 tab(s) orally 2 times a day (07 Jul 2025 12:33)  MULTIVITAMIN  TABLET   [facility]: TAKE 1 TABLET  BY MOUTH DAILY (07 Jul 2025 12:36)  SENNA 8.6 MG ORAL TABLET  2024: TAKE 2 TABLETS (17.2 MG) BY MOUTH TWICE DAILY (07 Jul 2025 12:38)  thiamine 100 mg oral tablet: 1 tab(s) orally once a day (07 Jul 2025 04:17)  Vitamin B-12 1000 mcg oral tablet: 1 tab(s) orally once a day (07 Jul 2025 04:17)  VITAMIN B-12 1000 MCG TABS  22: TAKE 1 TABLET BY MOUTH EVERY OTHER DAY (07 Jul 2025 12:38)  XCOPRI       TAB 200MG:  (07 Jul 2025 12:36)  XCOPRI       TAB 50MG:  (07 Jul 2025 12:38)  Xcopri 200 mg oral tablet: 1 tab(s) orally once a day in the morning (07 Jul 2025 04:17)  Xcopri 50 mg oral tablet: 1 tab(s) orally once a day in the evening (07 Jul 2025 04:17)    MEDICATIONS  STANDING MEDICATIONS  artificial tears (preservative free) Ophthalmic Solution 1 Drop(s) Both EYES daily  bisacodyl 5 milliGRAM(s) Oral at bedtime  cefpodoxime 200 milliGRAM(s) Oral every 12 hours  cenobamate 200 milliGRAM(s) Oral every 24 hours  cenobamate 50 milliGRAM(s) Oral at bedtime  cloBAZam 10 milliGRAM(s) Oral every 24 hours  famotidine    Tablet 20 milliGRAM(s) Oral daily  folic acid 1 milliGRAM(s) Oral daily  levothyroxine 88 MICROGram(s) Oral every 24 hours  multivitamin/minerals 1 Tablet(s) Oral daily  polyethylene glycol 3350 17 Gram(s) Oral every 12 hours  rosuvastatin 5 milliGRAM(s) Oral at bedtime  senna 2 Tablet(s) Oral at bedtime  thiamine 100 milliGRAM(s) Oral every 24 hours  thiamine IVPB 500 milliGRAM(s) IV Intermittent every 8 hours    PRN MEDICATIONS  LORazepam   Injectable 1 milliGRAM(s) IV Push once PRN    VITALS:  T(F): 97.8  HR: 63  BP: 128/72  RR: 18  SpO2: 98%    <<<<<NEURO EXAM>>>>>  MENTAL STATUS: Alert and oriented to name and partially the place  LANG/SPEECH: Slow speech but able to answer most questions; able to communicate in both [place] and English   CRANIAL NERVES:  Eyes: Pupils dilated but equal and reactive; EOM intact, tracks around the room  Face: decreased nasolabial folds on the left side  ENT: normal hearing to speech  MOTOR: intact grasp strength; strength testing consistent with 3/5 in b/l UE and LE  REFLEXES: 1+ in b/l LEs, 2+ reflexes in b/l UEs  SENSORY: Normal to touch in all extremities  Gait: Deferred    <<<<<LABS>>>>>                        11.9   4.58  )-----------( 203      ( 09 Jul 2025 00:53 )             36.4     07-09    140  |  107  |  14  ----------------------------<  102[H]  4.0   |  24  |  0.36[L]    Ca    8.6      09 Jul 2025 00:53    Urinalysis Basic - ( 09 Jul 2025 00:53 )    Color: x / Appearance: x / SG: x / pH: x  Gluc: 102 mg/dL / Ketone: x  / Bili: x / Urobili: x   Blood: x / Protein: x / Nitrite: x   Leuk Esterase: x / RBC: x / WBC x   Sq Epi: x / Non Sq Epi: x / Bacteria: x    Culture - Blood (collected 08 Jul 2025 14:02)  Source: Blood Blood-Peripheral  Preliminary Report (10 Jul 2025 01:01):    No growth at 24 hours    Culture - Blood (collected 07 Jul 2025 16:00)  Source: Blood Blood-Peripheral  Preliminary Report (10 Jul 2025 01:01):    No growth at 48 Hours    Culture - Blood (collected 07 Jul 2025 16:00)  Source: Blood Blood-Peripheral  Preliminary Report (10 Jul 2025 01:01):    No growth at 48 Hours    [id number]        <<<<<RADIOLOGY>>>>>        -----------------------------------------------------------------------------------------------------------------------------------------------------------------------------------------------

## 2025-07-10 NOTE — PROGRESS NOTE ADULT - PROBLEM SELECTOR PLAN 1
Pt p/w 1 day of somnolence and declining AMS from baseline, according to HHA. On exam pt follows commands, but further ROS limited 2/2 baseline mental status. UA negative in ED. CT head negative r/o acute intracranial causes including hydrocephalus, mass effect, or acute intracranial hemorrhage. Normal B12, folate. RPR negative. Utox positive for benzos (i/s/o clozabam)    Etiology possible 2/2 toxic metabolic incephalapthy given fever and + blood cx      Per Epilepsy Recs:  -Discontinue VEEG  -can continue with current regimen of Xcopri and Clobazam for now until med rec confirmed  - If seizures can do PRN ativan 1mg  -f/u Dr. Belcher and Dr. Jeter of Epilepsy clinic  - f/u clobazam and Xcopri levels  - rest per primary team. Pt p/w 1 day of somnolence and declining AMS from baseline, according to HHA. On exam pt follows commands, but further ROS limited 2/2 baseline mental status. UA negative in ED. CT head negative r/o acute intracranial causes including hydrocephalus, mass effect, or acute intracranial hemorrhage. Normal B12, folate. RPR negative. Utox positive for benzos (i/s/o clozabam)    Etiology possible 2/2 toxic metabolic incephalapthy given fever and + blood cx    VNS IMPLANT MODEL: . [employer]   Per Epilepsy Recs:  -Discontinue VEEG  -can continue with current regimen of Xcopri and Clobazam for now until med rec confirmed  - If seizures can do PRN ativan 1mg  -f/u Dr. Belcher and Dr. Jeter of Epilepsy clinic  - f/u clobazam and Xcopri levels  - rest per primary team. Pt p/w 1 day of somnolence and declining AMS from baseline, according to HHA. On exam pt follows commands, but further ROS limited 2/2 baseline mental status. UA negative in ED. CT head negative r/o acute intracranial causes including hydrocephalus, mass effect, or acute intracranial hemorrhage. Normal B12, folate. RPR negative. Utox positive for benzos (i/s/o clozabam).   Etiology possibly 2/2 toxic metabolic encephalopathy given fever and + blood cx.  As of 7/10- negative blood cultures 48 hours. Epilepsy medication management confirmed with Dr. Belcher of [facility] [place]. VNS adjustments/hx confirmed with Dr. Gregory team (placed the device)-serial number and type of device confirmed. (VNS IMPLANT MODEL: . [employer]iva )    Plan    -can continue with current regimen of Xcopri and Clobazam for now until med rec confirmed  - If seizures can do PRN ativan 1mg  -f/u Dr. Belcher and Dr. Jeter of Epilepsy clinic  - f/u clobazam and Xcopri levels  - rest per primary team. Pt p/w 1 day of somnolence and declining AMS from baseline, according to HHA. On exam pt follows commands, but further ROS limited 2/2 baseline mental status. UA negative in ED. CT head negative r/o acute intracranial causes including hydrocephalus, mass effect, or acute intracranial hemorrhage. Normal B12, folate. RPR negative. Utox positive for benzos (i/s/o clozabam).   Etiology possibly 2/2 toxic metabolic encephalopathy given fever and + blood cx.  As of 7/10- negative blood cultures 48 hours. Epilepsy medication management confirmed with Dr. Belcher of [facility]. VNS adjustments/hx confirmed with Dr. Gregory team (placed the device)-serial number and type of device confirmed. (VNS IMPLANT MODEL: . [email or web address] )    Plan  -MRI head (Epilepsy team consulted for turning VNS on/off)  -Whole Body Gallium Scan  -Continue Xcopri/Clobazam home regiment (per Ye confirmation)  -If seizures: can do 1mg Ativan

## 2025-07-10 NOTE — PROGRESS NOTE ADULT - PROBLEM SELECTOR PLAN 3
In ED met 1/4 SIRS criteria (fever)  WBC of 7.5, UA negative, RVP negative, CTH negative  CTA with no PE. Extensive soft tissue swelling throughout the right shoulder region, visualized right upper extremity, and anterior upper right chest wall. This has increased since the CT of the abdomen pelvis from 6/26/2025. Several gas foci along the medial aspect of the upper arm. Gas foci within the right medial arm were also visualized on prior CT. No organized fluid collection is imaged.  On exam: RUE without signs of infections, appears to be IV insertion attempt to R arm. No crepitus, erythema, induration noted. NVI in distal hand. No neck stiffness/photophobia. Pupils 5-6mm B/L and reactive    -Continue vancomycin for Staph Epi (pending susceptibilities)  -Hold CTX (rec per ID but patient with PCN anaphylaxis-Vanc will cover staph epi for now)  -MRI w/ galolinium of LUE.  -CTM fever curve  -F/u susceptibilities  -F/u blood cxs In ED met 1/4 SIRS criteria (fever)  WBC of 7.5, UA negative, RVP negative, CTH negative  CTA with no PE. Extensive soft tissue swelling throughout the right shoulder region, visualized right upper extremity, and anterior upper right chest wall. This has increased since the CT of the abdomen pelvis from 6/26/2025. Several gas foci along the medial aspect of the upper arm. Gas foci within the right medial arm were also visualized on prior CT. No organized fluid collection is imaged.  On exam: RUE without signs of infections, appears to be IV insertion attempt to R arm. No crepitus, erythema, induration noted. NVI in distal hand. No neck stiffness/photophobia. Pupils 5-6mm B/L and reactive.  Blood cultures from 7/7 negative after 48h.    -Continue vancomycin for Staph Epi (pending susceptibilities)  -Hold CTX (f/u susceptibilities)  -CTM fever curve In ED met 1/4 SIRS criteria (fever)  WBC of 7.5, UA negative, RVP negative, CTH negative  CTA with no PE. Extensive soft tissue swelling throughout the right shoulder region, visualized right upper extremity, and anterior upper right chest wall. This has increased since the CT of the abdomen pelvis from 6/26/2025. Several gas foci along the medial aspect of the upper arm. Gas foci within the right medial arm were also visualized on prior CT. No organized fluid collection is imaged.  On exam: RUE without signs of infections, appears to be IV insertion attempt to R arm. No crepitus, erythema, induration noted. NVI in distal hand. No neck stiffness/photophobia. Pupils 5-6mm B/L and reactive.  Blood cultures from 7/7 negative after 48h.      -Continue vancomycin for Staph Epi (pending susceptibilities)  -Hold CTX (f/u susceptibilities)  -Vanco trough ordered for   -Hold of on LP (If LP done, please send CSF cell count, protein, glucose, CSF cx, CSF PCR, CSF HSV-1 and CSF HSV-2 PCR.)  -CTM fever curve

## 2025-07-10 NOTE — PROGRESS NOTE ADULT - SUBJECTIVE AND OBJECTIVE BOX
SUBJECTIVE:   PT seen bedside. Much less lethargic than prior and feeling better. Would like someone to call sister. No complaints of pain. AOx1 to self.    Constitutional: Resting comfortably in bed; NAD; Speech slurred with moderate pauses  ENT: MMM  Neck: supple  Respiratory: CTA B/L  Cardiac: RRR  Gastrointestinal: soft, non tender  Extremities: No signs of superficial wound superficial to shoulder, LUE, axilla.  Musculoskeletal: NROM x4  Vascular: 2+  DP/PT pulses B/L  Neurologic: AAOx1; follows commands, no focal deficits  VITAL SIGNS:  T(C): 36.4 (07-10-25 @ 12:25), Max: 36.6 (07-09-25 @ 20:45)  HR: 56 (07-10-25 @ 12:25) (56 - 64)  BP: 136/76 (07-10-25 @ 12:25) (107/71 - 136/76)  RR: 18 (07-10-25 @ 12:25) (18 - 18)  SpO2: 99% (07-10-25 @ 12:25) (94% - 99%)    artificial tears (preservative free) Ophthalmic Solution 1 Drop(s) Both EYES daily  bisacodyl 5 milliGRAM(s) Oral at bedtime  cefpodoxime 200 milliGRAM(s) Oral every 12 hours  cenobamate 200 milliGRAM(s) Oral every 24 hours  cenobamate 50 milliGRAM(s) Oral at bedtime  cloBAZam 10 milliGRAM(s) Oral every 24 hours  famotidine    Tablet 20 milliGRAM(s) Oral daily  folic acid 1 milliGRAM(s) Oral daily  levothyroxine 88 MICROGram(s) Oral every 24 hours  LORazepam   Injectable 1 milliGRAM(s) IV Push once PRN  multivitamin/minerals 1 Tablet(s) Oral daily  polyethylene glycol 3350 17 Gram(s) Oral every 12 hours  rosuvastatin 5 milliGRAM(s) Oral at bedtime  senna 2 Tablet(s) Oral at bedtime  thiamine 100 milliGRAM(s) Oral every 24 hours  thiamine IVPB 500 milliGRAM(s) IV Intermittent every 8 hours      Consultant(s) Notes Reviewed:  [x] YES  [ ] NO  Care Discussed with Consultants/Other Providers [x] YES  [ ] NO    LABS:                        11.9   4.58  )-----------( 203      ( 09 Jul 2025 00:53 )             36.4     07-09    140  |  107  |  14  ----------------------------<  102[H]  4.0   |  24  |  0.36[L]    Ca    8.6      09 Jul 2025 00:53        Urinalysis Basic - ( 09 Jul 2025 00:53 )    Color: x / Appearance: x / SG: x / pH: x  Gluc: 102 mg/dL / Ketone: x  / Bili: x / Urobili: x   Blood: x / Protein: x / Nitrite: x   Leuk Esterase: x / RBC: x / WBC x   Sq Epi: x / Non Sq Epi: x / Bacteria: x      CAPILLARY BLOOD GLUCOSE      POCT Blood Glucose.: 98 mg/dL (10 Jul 2025 13:29)  POCT Blood Glucose.: 83 mg/dL (10 Jul 2025 06:07)  POCT Blood Glucose.: 117 mg/dL (09 Jul 2025 23:41)        Urinalysis Basic - ( 09 Jul 2025 00:53 )    Color: x / Appearance: x / SG: x / pH: x  Gluc: 102 mg/dL / Ketone: x  / Bili: x / Urobili: x   Blood: x / Protein: x / Nitrite: x   Leuk Esterase: x / RBC: x / WBC x   Sq Epi: x / Non Sq Epi: x / Bacteria: x        RADIOLOGY, EKG, & ADDITIONAL TESTS:      Imaging Personally Reviewed:  [x] YES  [ ] NO

## 2025-07-10 NOTE — PROGRESS NOTE ADULT - ASSESSMENT
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 Epilepsy consulted for guidance iso AMS and established medical history of epilepsy. Patient more alert today on exam and able to answer questions. vEEG unremarkable for seizure activity. Given history and presentation, as well positive blood cultures, suspect symptoms likely 2/2 to underlying toxic metabolic encephelopathy vs patient baseline given collateral obtained from outpatient neurologist.  Collateral: Per Dr. Belcher, patient has symptomatic multifocal epilepsy that has been well controlled on current regimen of Xcopri 250mg/daily and clobazam 10mg/night. Last seizure and med changes > 2 years ago. She has had frequent admissions to [facility] For acute AMS of unknown origin with EEGs and work up unrevealing for source. Mental status typically improves on its own. VNS battery was changed by NSx Dr Gregory at [facility] on 5/12/2025. There have been no changes to the VNS setting.    Recommendations:  - Continue with Xcopri 250 mg qd and Clobazam 10 mg qd (med rec confirmed-see chart note)  - f/u clobazam and Xcopri levels  - f/u ID recs for source control and management of possible infection  - rest per primary team    *Case discussed with Dr. Arguello

## 2025-07-10 NOTE — DISCHARGE NOTE NURSING/CASE MANAGEMENT/SOCIAL WORK - NSDCPEFALRISK_GEN_ALL_CORE
For information on Fall & Injury Prevention, visit: [email or web address] OR  [email or web address] OR  https://www.cdc.gov/steadi/patient.html

## 2025-07-10 NOTE — PROGRESS NOTE ADULT - PROBLEM SELECTOR PLAN 2
AXR with stool burden.  BS 400s -> patient received SC       -Switch from tap water to mineral oil enema  -Continue with Senna, Miralax  -Resume bladder scans Q6h: if residual >330cc and patient does not urinate within 1 hr of B/S; consider Wilson if requires straight cath 3 or more times  -CTM bowel movements and urine output, if patient still retaining after stool cleared consider wilson. AXR with stool burden.  BS 400s -> patient received SC  Wilson removed (2am).     -Continue with Senna, Miralax  -Resume bladder scans Q6h: if residual >330cc and patient does not urinate within 1 hr of B/S; consider Wilson if requires straight cath 3 or more times  -CTM bowel movements and urine output, if patient still retaining after stool cleared consider wilson. AXR with stool burden.  BS 400s -> patient received SC. 7/9 AXR revealed moderate stool burden.  Wilson removed (2am).     -d/c mineral oil enema  -Continue with Senna, Miralax  -Resume bladder scans Q6h: if residual >330cc and patient does not urinate within 1 hr of B/S; consider Wilson if requires straight cath 3 or more times  -CTM bowel movements and urine output, if patient still retaining after stool cleared consider wilson.

## 2025-07-10 NOTE — PROGRESS NOTE ADULT - SUBJECTIVE AND OBJECTIVE BOX
INTERVAL HPI/OVERNIGHT EVENTS:  OVERNIGHT: ***    SUBJECTIVE: ***    Vital Signs Last 24 Hrs  T(C): 36.6 (10 Jul 2025 05:11), Max: 36.6 (09 Jul 2025 20:45)  T(F): 97.8 (10 Jul 2025 05:11), Max: 97.8 (09 Jul 2025 20:45)  HR: 63 (10 Jul 2025 05:11) (55 - 64)  BP: 128/72 (10 Jul 2025 05:11) (107/71 - 128/72)  BP(mean): --  RR: 18 (10 Jul 2025 05:11) (18 - 18)  SpO2: 98% (10 Jul 2025 05:11) (94% - 98%)    Parameters below as of 10 Jul 2025 05:11  Patient On (Oxygen Delivery Method): room air        PHYSICAL EXAM:  General: ***  Eyes: EOMI intact bilaterally. Anicteric sclerae, moist conjunctivae  HENT: Moist mucous membranes  Neck: Trachea midline, supple ***  Lungs: Clear to ascultation B/L. No wheezes, rales, or rhonchi  Cardiovascular: RRR. No murmurs, rubs, or gallops  Abdomen: Soft, non-tender non-distended; No rebound or guarding  Extremities: WWP, No clubbing, cyanosis or edema  Neurological: Alert and oriented   Skin: Warm and dry. No obvious rash     LABS:                        11.9   4.58  )-----------( 203      ( 09 Jul 2025 00:53 )             36.4     07-09    140  |  107  |  14  ----------------------------<  102[H]  4.0   |  24  |  0.36[L]    Ca    8.6      09 Jul 2025 00:53     INTERVAL HPI/OVERNIGHT EVENTS:  OVERNIGHT: ***    SUBJECTIVE: VNS IMPLANT MODEL: . Yadiel Anderson     Vital Signs Last 24 Hrs  T(C): 36.6 (10 Jul 2025 05:11), Max: 36.6 (09 Jul 2025 20:45)  T(F): 97.8 (10 Jul 2025 05:11), Max: 97.8 (09 Jul 2025 20:45)  HR: 63 (10 Jul 2025 05:11) (55 - 64)  BP: 128/72 (10 Jul 2025 05:11) (107/71 - 128/72)  BP(mean): --  RR: 18 (10 Jul 2025 05:11) (18 - 18)  SpO2: 98% (10 Jul 2025 05:11) (94% - 98%)    Parameters below as of 10 Jul 2025 05:11  Patient On (Oxygen Delivery Method): room air        PHYSICAL EXAM:  General: ***  Eyes: EOMI intact bilaterally. Anicteric sclerae, moist conjunctivae  HENT: Moist mucous membranes  Neck: Trachea midline, supple ***  Lungs: Clear to ascultation B/L. No wheezes, rales, or rhonchi  Cardiovascular: RRR. No murmurs, rubs, or gallops  Abdomen: Soft, non-tender non-distended; No rebound or guarding  Extremities: WWP, No clubbing, cyanosis or edema  Neurological: Alert and oriented   Skin: Warm and dry. No obvious rash     LABS:                        11.9   4.58  )-----------( 203      ( 09 Jul 2025 00:53 )             36.4     07-09    140  |  107  |  14  ----------------------------<  102[H]  4.0   |  24  |  0.36[L]    Ca    8.6      09 Jul 2025 00:53     INTERVAL HPI/OVERNIGHT EVENTS:  OVERNIGHT: Blood cultures revealed no growth after 48h.   VNS IMPLANT MODEL CONFIRMED: . Yadiel Anderson  (relevant for MRI compatibility- Epilepsy team consulted)    SUBJECTIVE: Endorses sleeping well overnight. Denies any abdominal pain. She was unable to clairfy if she has had a bowel movementt since yesterday.      Vital Signs Last 24 Hrs  T(C): 36.6 (10 Jul 2025 05:11), Max: 36.6 (09 Jul 2025 20:45)  T(F): 97.8 (10 Jul 2025 05:11), Max: 97.8 (09 Jul 2025 20:45)  HR: 63 (10 Jul 2025 05:11) (55 - 64)  BP: 128/72 (10 Jul 2025 05:11) (107/71 - 128/72)  BP(mean): --  RR: 18 (10 Jul 2025 05:11) (18 - 18)  SpO2: 98% (10 Jul 2025 05:11) (94% - 98%)    Parameters below as of 10 Jul 2025 05:11  Patient On (Oxygen Delivery Method): room air        PHYSICAL EXAM:  General: laying comfortably in bed  Eyes: EOMI intact bilaterally. Anicteric sclerae, moist conjunctivae  HENT: Dry mucous membranes  Neck: Trachea midline, supple  Lungs: Clear to ascultation B/L. No wheezes, rales, or rhonchi  Cardiovascular: RRR. No murmurs, rubs, or gallops  Abdomen: Soft, non-tender non-distended; No rebound or guarding  Extremities: WWP, No clubbing, cyanosis or edema  Neurological: Alert and oriented   Skin: Warm and dry. No obvious rash                           11.9   4.58  )-----------( 203      ( 09 Jul 2025 00:53 )             36.4     07-09    140  |  107  |  14  ----------------------------<  102[H]  4.0   |  24  |  0.36[L]    Ca    8.6      09 Jul 2025 00:53      POCT Blood Glucose (07.10.25 @ 06:07)   POCT Blood Glucose.: 83: Notified RN mg/dL    < from: Xray Abdomen 1 View PORTABLE -Urgent (Xray Abdomen 1 View PORTABLE -Urgent .) (07.09.25 @ 16:22) >    INTERPRETATION:  Abdomen one view, portable exam    HISTORY: Constipation, urinary retention, evaluate stool burden    IMPRESSION:    Supine view.    Nonspecific bowel gas pattern with no bowel distention or obstruction.   Limited exam secondary to technical factors/body habitus. Moderate fecal   material noted throughout colon. Lung bases clear. No acute bone   abnormality.         INTERVAL HPI/OVERNIGHT EVENTS:  OVERNIGHT: Blood cultures revealed no growth after 48h.   VNS IMPLANT MODEL CONFIRMED: . Yadiel Anderson  (relevant for MRI compatibility- Epilepsy team consulted)    SUBJECTIVE: Endorses sleeping well overnight. Denies any abdominal pain. She was unable to clairfy if she has had a bowel movement since yesterday.      Vital Signs Last 24 Hrs  T(C): 36.6 (10 Jul 2025 05:11), Max: 36.6 (09 Jul 2025 20:45)  T(F): 97.8 (10 Jul 2025 05:11), Max: 97.8 (09 Jul 2025 20:45)  HR: 63 (10 Jul 2025 05:11) (55 - 64)  BP: 128/72 (10 Jul 2025 05:11) (107/71 - 128/72)  BP(mean): --  RR: 18 (10 Jul 2025 05:11) (18 - 18)  SpO2: 98% (10 Jul 2025 05:11) (94% - 98%)    Parameters below as of 10 Jul 2025 05:11  Patient On (Oxygen Delivery Method): room air        PHYSICAL EXAM:  General: laying comfortably in bed  Eyes: EOMI intact bilaterally. Anicteric sclerae, moist conjunctivae  HENT: Dry mucous membranes  Neck: Trachea midline, supple  Lungs: Clear to ascultation B/L. No wheezes, rales, or rhonchi  Cardiovascular: RRR. No murmurs, rubs, or gallops  Abdomen: Soft, non-tender non-distended; No rebound or guarding  Extremities: WWP, No clubbing, cyanosis or edema  Neurological: Alert and oriented   Skin: Warm and dry. No obvious rash                           11.9   4.58  )-----------( 203      ( 09 Jul 2025 00:53 )             36.4     07-09    140  |  107  |  14  ----------------------------<  102[H]  4.0   |  24  |  0.36[L]    Ca    8.6      09 Jul 2025 00:53      POCT Blood Glucose (07.10.25 @ 06:07)   POCT Blood Glucose.: 83: Notified RN mg/dL    < from: Xray Abdomen 1 View PORTABLE -Urgent (Xray Abdomen 1 View PORTABLE -Urgent .) (07.09.25 @ 16:22) >    INTERPRETATION:  Abdomen one view, portable exam    HISTORY: Constipation, urinary retention, evaluate stool burden    IMPRESSION:    Supine view.    Nonspecific bowel gas pattern with no bowel distention or obstruction.   Limited exam secondary to technical factors/body habitus. Moderate fecal   material noted throughout colon. Lung bases clear. No acute bone   abnormality.

## 2025-07-10 NOTE — PROGRESS NOTE ADULT - ASSESSMENT
A 70-year-old female with a history of epilepsy, VNS placement, hypothyroidism, and Takotsubo cardiomyopathy was brought to the ED by HHA with altered mental status and fever. Pt admitted to [facility] for further evaluation and managment with vEEG and ABx. Currently afebrile but course c/b toxic metabolic encephalopathy

## 2025-07-10 NOTE — PROGRESS NOTE ADULT - ASSESSMENT
cefpodoxime 1g q8hr  Hold vanc, ceftriaxone.  ID Team 1 will follow.   70F pmhx refractory epilepsy s/p VNS, revision in May 2025, hypothyroidism, Takotsubo cardiomyopathy admitted with fever, AMS. In the ED, she was reportely awake and responding to questions/following commands but A&Ox1. T 101.8 rectal, other VS were stable. WBC 7.58, UA negative, RVP negative, Lactate 1.9, TSH WNL. CTH No evidence of acute transcortical infarct, acute intracranial hemorrhage, or mass effect.  CTA chest and CT A/P showed extensive soft tissue swelling throughout the right shoulder region, visualized right upper extremity, and anterior upper right chest wall. She was given tylenol and Levaquin 750mg x1 in the ED d/t PCN allergy. Afebrile, HDS since admission. WBC 3k, prev normal.   BCx + 4/4 CoNS multiple species, likely contaminant.  Primary team c/f bacteremia and c/f device infection, gallium scan showed resolving LL PNA.  EEG with c/f R temporal lobe epileptiform discharges. D/w Dr. Vasquez, per collateral from pts HHA her mentation waxes and wanes.  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   - If mentation worsens, can consider Brain MRI wwo IVC  - DC Vancomycin  - PO Cefpodoxime 200 mg q12h x 5 days EOT 7/14  ID Team 1 will sign off, please call us back as needed or with questions.  D/w Dr. Vasquez

## 2025-07-10 NOTE — DISCHARGE NOTE NURSING/CASE MANAGEMENT/SOCIAL WORK - PATIENT PORTAL LINK FT
You can access the FollowMyHealth Patient Portal offered by [facility] by registering at the following website: [email or web address]. By joining [employer]’s FollowMyHealth portal, you will also be able to view your health information using other applications (apps) compatible with our system.

## 2025-07-11 LAB
CLOBAZAM + NOR PNL SERPL: 43 NG/ML — SIGNIFICANT CHANGE UP (ref 30–300)
DESMETHYLCLOBAZAM: 3255 NG/ML — HIGH (ref 300–3000)

## 2025-07-12 LAB — CENOBAMATE SERPL CFM-MCNC: 53.2 UG/ML — HIGH

## 2025-07-14 LAB
CULTURE RESULTS: SIGNIFICANT CHANGE UP
SPECIMEN SOURCE: SIGNIFICANT CHANGE UP

## 2025-07-23 ENCOUNTER — EMERGENCY (EMERGENCY)
Facility: HOSPITAL | Age: 70
LOS: 1 days | End: 2025-07-23
Attending: EMERGENCY MEDICINE | Admitting: EMERGENCY MEDICINE
Payer: MEDICARE

## 2025-07-23 VITALS
HEIGHT: 68 IN | OXYGEN SATURATION: 96 % | TEMPERATURE: 98 F | DIASTOLIC BLOOD PRESSURE: 61 MMHG | SYSTOLIC BLOOD PRESSURE: 137 MMHG | RESPIRATION RATE: 14 BRPM | HEART RATE: 51 BPM

## 2025-07-23 VITALS
HEART RATE: 52 BPM | TEMPERATURE: 97 F | RESPIRATION RATE: 19 BRPM | OXYGEN SATURATION: 96 % | SYSTOLIC BLOOD PRESSURE: 136 MMHG | DIASTOLIC BLOOD PRESSURE: 81 MMHG

## 2025-07-23 LAB
ALBUMIN SERPL ELPH-MCNC: 3.1 G/DL — LOW (ref 3.4–5)
ALP SERPL-CCNC: 138 U/L — HIGH (ref 40–120)
ALT FLD-CCNC: 26 U/L — SIGNIFICANT CHANGE UP (ref 12–42)
ANION GAP SERPL CALC-SCNC: 6 MMOL/L — LOW (ref 9–16)
APPEARANCE UR: CLEAR — SIGNIFICANT CHANGE UP
AST SERPL-CCNC: 27 U/L — SIGNIFICANT CHANGE UP (ref 15–37)
BASOPHILS # BLD AUTO: 0.02 K/UL — SIGNIFICANT CHANGE UP (ref 0–0.2)
BASOPHILS NFR BLD AUTO: 0.6 % — SIGNIFICANT CHANGE UP (ref 0–2)
BILIRUB SERPL-MCNC: 0.2 MG/DL — SIGNIFICANT CHANGE UP (ref 0.2–1.2)
BILIRUB UR-MCNC: NEGATIVE — SIGNIFICANT CHANGE UP
BUN SERPL-MCNC: 18 MG/DL — SIGNIFICANT CHANGE UP (ref 7–23)
CALCIUM SERPL-MCNC: 9.1 MG/DL — SIGNIFICANT CHANGE UP (ref 8.5–10.5)
CHLORIDE SERPL-SCNC: 107 MMOL/L — SIGNIFICANT CHANGE UP (ref 96–108)
CO2 SERPL-SCNC: 31 MMOL/L — SIGNIFICANT CHANGE UP (ref 22–31)
COLOR SPEC: YELLOW — SIGNIFICANT CHANGE UP
CREAT SERPL-MCNC: 0.54 MG/DL — SIGNIFICANT CHANGE UP (ref 0.5–1.3)
DIFF PNL FLD: NEGATIVE — SIGNIFICANT CHANGE UP
EGFR: 100 ML/MIN/1.73M2 — SIGNIFICANT CHANGE UP
EGFR: 100 ML/MIN/1.73M2 — SIGNIFICANT CHANGE UP
EOSINOPHIL # BLD AUTO: 0.07 K/UL — SIGNIFICANT CHANGE UP (ref 0–0.5)
EOSINOPHIL NFR BLD AUTO: 2 % — SIGNIFICANT CHANGE UP (ref 0–6)
GLUCOSE SERPL-MCNC: 93 MG/DL — SIGNIFICANT CHANGE UP (ref 70–99)
GLUCOSE UR QL: NEGATIVE MG/DL — SIGNIFICANT CHANGE UP
HCT VFR BLD CALC: 41.6 % — SIGNIFICANT CHANGE UP (ref 34.5–45)
HGB BLD-MCNC: 13.7 G/DL — SIGNIFICANT CHANGE UP (ref 11.5–15.5)
IMM GRANULOCYTES # BLD AUTO: 0.01 K/UL — SIGNIFICANT CHANGE UP (ref 0–0.07)
IMM GRANULOCYTES NFR BLD AUTO: 0.3 % — SIGNIFICANT CHANGE UP (ref 0–0.9)
KETONES UR QL: NEGATIVE MG/DL — SIGNIFICANT CHANGE UP
LEUKOCYTE ESTERASE UR-ACNC: ABNORMAL
LYMPHOCYTES # BLD AUTO: 1.1 K/UL — SIGNIFICANT CHANGE UP (ref 1–3.3)
LYMPHOCYTES NFR BLD AUTO: 31.4 % — SIGNIFICANT CHANGE UP (ref 13–44)
MAGNESIUM SERPL-MCNC: 1.8 MG/DL — SIGNIFICANT CHANGE UP (ref 1.6–2.6)
MCHC RBC-ENTMCNC: 32.9 G/DL — SIGNIFICANT CHANGE UP (ref 32–36)
MCHC RBC-ENTMCNC: 34.4 PG — HIGH (ref 27–34)
MCV RBC AUTO: 104.5 FL — HIGH (ref 80–100)
MONOCYTES # BLD AUTO: 0.34 K/UL — SIGNIFICANT CHANGE UP (ref 0–0.9)
MONOCYTES NFR BLD AUTO: 9.7 % — SIGNIFICANT CHANGE UP (ref 2–14)
NEUTROPHILS # BLD AUTO: 1.96 K/UL — SIGNIFICANT CHANGE UP (ref 1.8–7.4)
NEUTROPHILS NFR BLD AUTO: 56 % — SIGNIFICANT CHANGE UP (ref 43–77)
NITRITE UR-MCNC: NEGATIVE — SIGNIFICANT CHANGE UP
NRBC # BLD AUTO: 0 K/UL — SIGNIFICANT CHANGE UP (ref 0–0)
NRBC # FLD: 0 K/UL — SIGNIFICANT CHANGE UP (ref 0–0)
NRBC BLD AUTO-RTO: 0 /100 WBCS — SIGNIFICANT CHANGE UP (ref 0–0)
PH UR: 7 — SIGNIFICANT CHANGE UP (ref 5–8)
PLATELET # BLD AUTO: 184 K/UL — SIGNIFICANT CHANGE UP (ref 150–400)
PMV BLD: 9.1 FL — SIGNIFICANT CHANGE UP (ref 7–13)
POTASSIUM SERPL-MCNC: 4.3 MMOL/L — SIGNIFICANT CHANGE UP (ref 3.5–5.3)
POTASSIUM SERPL-SCNC: 4.3 MMOL/L — SIGNIFICANT CHANGE UP (ref 3.5–5.3)
PROT SERPL-MCNC: 7 G/DL — SIGNIFICANT CHANGE UP (ref 6.4–8.2)
PROT UR-MCNC: NEGATIVE MG/DL — SIGNIFICANT CHANGE UP
RBC # BLD: 3.98 M/UL — SIGNIFICANT CHANGE UP (ref 3.8–5.2)
RBC # FLD: 14.3 % — SIGNIFICANT CHANGE UP (ref 10.3–14.5)
SODIUM SERPL-SCNC: 144 MMOL/L — SIGNIFICANT CHANGE UP (ref 132–145)
SP GR SPEC: 1.01 — SIGNIFICANT CHANGE UP (ref 1–1.03)
TROPONIN I, HIGH SENSITIVITY RESULT: 13.8 NG/L — SIGNIFICANT CHANGE UP
UROBILINOGEN FLD QL: 0.2 MG/DL — SIGNIFICANT CHANGE UP (ref 0.2–1)
WBC # BLD: 3.5 K/UL — LOW (ref 3.8–10.5)
WBC # FLD AUTO: 3.5 K/UL — LOW (ref 3.8–10.5)

## 2025-07-23 PROCEDURE — 70450 CT HEAD/BRAIN W/O DYE: CPT | Mod: 26

## 2025-07-23 PROCEDURE — 71045 X-RAY EXAM CHEST 1 VIEW: CPT | Mod: 26

## 2025-07-23 PROCEDURE — 99284 EMERGENCY DEPT VISIT MOD MDM: CPT | Mod: FS

## 2025-07-23 NOTE — ED PROVIDER NOTE - PATIENT PORTAL LINK FT
You can access the FollowMyHealth Patient Portal offered by [facility] by registering at the following website: [email or web address]. By joining [employer]’s FollowMyHealth portal, you will also be able to view your health information using other applications (apps) compatible with our system.

## 2025-07-23 NOTE — ED ADULT NURSE NOTE - BREATHING, MLM
11-12 TOMORROW



[facility] called to report they will  [place] 11-12 tomorrow.



SIDDHARTHA Salamanca Spontaneous, unlabored and symmetrical

## 2025-07-23 NOTE — ED ADULT NURSE REASSESSMENT NOTE - NS ED NURSE REASSESS COMMENT FT1
IV dc with tip intact and patient cleaned and changed into new diaper  DC gone over with caregiver and patient  Pt being transported via ems. All care complete and endorsed to ems. See flowchart for vs. All ppwk and belonigns going with patient and ems. Pt in no current distress and has no new complaints. Care Complete, transfered over to EMS

## 2025-07-23 NOTE — ED PROVIDER NOTE - OBJECTIVE STATEMENT
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  Per EMS, patient receives medications to help her sleep each evening and wakes up very groggy.  The home health aide found her to be very groggy this morning, prompting call to EMS.  Denies fall/head trauma, chest pain, shortness of breath, abdominal pain, nausea/vomiting/diarrhea.

## 2025-07-23 NOTE — ED ADULT NURSE NOTE - OBJECTIVE STATEMENT
Pt is a 70y female c/o AMS. Pt had a new aide who called 911 d/ concern for pt being minimally responsive this AM w/ concern for change in mental status. As per sone this is patient's baseline. Pt provides 1 word responses occasionally, denies pain. Awake to voice but sleepy. Bed bound. Bradycardic-placed on monitoring.

## 2025-07-23 NOTE — ED PROVIDER NOTE - PROGRESS NOTE DETAILS
MD BK, attending physician: Called by patient's  Steve Ramon [phone] from [facility]. Patient resides in a co-op and they provide social work and nursing services. Was recently hospitalized at [facility] for AMS and an infection. Bedbound at baseline. Brother Rafael is healthcare proxy. MD BK, attending physician: Patient awake, alert, at baseline. Home attendant at bedside. Will arrange ambulance to take patient home.

## 2025-07-23 NOTE — ED PROVIDER NOTE - NSFOLLOWUPINSTRUCTIONS_ED_ALL_ED_FT
Altered Mental Status    WHAT YOU NEED TO KNOW:    Altered mental status (AMS) is a disruption in how your brain works that causes a change in behavior. This change can happen suddenly or over days. AMS ranges from slight confusion to total disorientation and increased sleepiness to coma.    DISCHARGE INSTRUCTIONS:    Medicines:    Antibiotics help fight or prevent infection. Take your antibiotics until they are gone, even if you feel better.    Take your medicine as directed. Contact your healthcare provider if you think your medicine is not helping or if you have side effects. Tell your provider if you are allergic to any medicine. Keep a list of the medicines, vitamins, and herbs you take. Include the amounts, and when and why you take them. Bring the list or the pill bottles to follow-up visits. Carry your medicine list with you in case of an emergency.  Follow up with your doctor as directed: Write down your questions so you remember to ask them during your visits.    Contact your healthcare provider if:    You have sudden changes in behavior.    You are more sleepy or confused than usual.    You have questions or concerns about your condition or care.  Seek care immediately or call 911 if:    Your speech is slurred or you are rambling.    You have a seizure.    You are not able to move any part of your body freely.    Someone close to you cannot wake you up.

## 2025-07-23 NOTE — ED ADULT NURSE REASSESSMENT NOTE - NS ED NURSE REASSESS COMMENT FT1
Pt placed on cardiac an O2 monitoring. Iv attempt x2 by WAN Calvillo unsuccessful. ERIKA Benavidez with 2x attempt US line, 2nd successful with labs sent.

## 2025-07-23 NOTE — ED ADULT NURSE NOTE - NSFALLRISKINTERV_ED_ALL_ED
Assistance OOB with selected safe patient handling equipment if applicable/Assistance with ambulation/Communicate fall risk and risk factors to all staff, patient, and family/Monitor gait and stability/Monitor for mental status changes and reorient to person, place, and time, as needed/Provide visual cue: yellow wristband, yellow gown, etc/Reinforce activity limits and safety measures with patient and family/Toileting schedule using arm’s reach rule for commode and bathroom/Use of alarms - bed, stretcher, chair and/or video monitoring/Call bell, personal items and telephone in reach/Instruct patient to call for assistance before getting out of bed/chair/stretcher/Non-slip footwear applied when patient is off stretcher/Ensign to call system/Physically safe environment - no spills, clutter or unnecessary equipment/Purposeful Proactive Rounding/Room/bathroom lighting operational, light cord in reach

## 2025-07-23 NOTE — ED ADULT NURSE NOTE - CHIEF COMPLAINT QUOTE
biba with hha, as per aid, pt given meds to sleep overnight (unknown what) then is drowsy in am and difficult to rouse, frequent for her but comes to the er for same, P3earl , aid states she was told to come to the er "as is usual for being drowsy" as per ems son sleeps in bed next to pt, but  couldn't given any hx to ems , Dr Green evaluating, BGl 105, pt eyes open to sternal rub

## 2025-07-23 NOTE — ED PROVIDER NOTE - PHYSICAL EXAMINATION
Vital Signs: I have reviewed the initial vital signs.  Constitutional: appears stated age, no acute distress  Eyes: Sclera clear, EOMI.  Cardiovascular: S1 and S2, regular rate, regular rhythm, well-perfused extremities, radial pulses equal and 2+, pedal pulses 2+ and equal  Respiratory: unlabored respiratory effort, clear to auscultation bilaterally no wheezing, rales, or rhonchi  Gastrointestinal:  abdomen soft, non-tender  Musculoskeletal: supple neck, no lower extremity edema  Integumentary: warm, dry, no rash  Neurologic: awake, alert, oriented, extremities’ motor and sensory functions grossly intact Vital Signs: I have reviewed the initial vital signs.  Constitutional: appears stated age, no acute distress  Eyes: Sclera clear, EOMI.  Cardiovascular: S1 and S2, regular rate, regular rhythm, well-perfused extremities, radial pulses equal and 2+, pedal pulses 2+ and equal  Respiratory: unlabored respiratory effort, clear to auscultation bilaterally no wheezing, rales, or rhonchi  Gastrointestinal:  abdomen soft, non-tender  Musculoskeletal: supple neck, no lower extremity edema  Integumentary: warm, dry, no rash  Neurologic: awake, alert, oriented x2 (person and place), extremities’ motor and sensory functions grossly intact

## 2025-07-23 NOTE — ED PROVIDER NOTE - ATTENDING APP SHARED VISIT CONTRIBUTION OF CARE
Patient's HHA arrived this morning, new to patient today, found patient unresponsive in bed. Per son, who sleeps in a bed in the same room with the patient, and per patient's overnight aide, this is patient's baseline in the morning.  However, the new aide was concerned and called 911.  Patient unable to give review of systems due to AMS.    Gen: NAD. sleeping, no response to pain or voice. HEENT: NCAT, mmm, PERRL  Chest: RRR, nl S1 and S2, no m/r/g. Resp: CTAB, no w/r/r  Abd: nl BS, soft, nt/nd. Ext: Warm, dry  Neuro: CN II-XII intact, normal and equal strength, sensation, and reflexes bilaterally    MDM: AMS vs patient's baseline. will get labs, CTH, reassess.

## 2025-07-23 NOTE — ED ADULT NURSE REASSESSMENT NOTE - NS ED NURSE REASSESS COMMENT FT1
Received report from Kenji RN for continuity of care at 1355  Pt in bed, alert to verbal, breathing equal and unlabored, on cardiac/spo2 monitoring with aid at bedside  Pt in nad, pending transport home

## 2025-07-23 NOTE — ED PROVIDER NOTE - CLINICAL SUMMARY MEDICAL DECISION MAKING FREE TEXT BOX
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  Per EMS, patient receives medications to help her sleep each evening and wakes up very groggy.  The home health aide found her to be very groggy this morning, prompting call to EMS.  Denies fall/head trauma, chest pain, shortness of breath, abdominal pain, nausea/vomiting/diarrhea.  Exam as noted.  Plan: labs, xr, ua, ct head

## 2025-07-23 NOTE — ED ADULT TRIAGE NOTE - CHIEF COMPLAINT QUOTE
biba with hha, as per aid, pt given meds to sleep overnight (unknown what) then is drowsy in am and difficult to rouse, frequent for her but comes to the er for same, P3earl , aid states she was told to come to the er "as is usual for being drowsy" as per ems son sleeps in bed next to pt, but didn't give any meds , Dr Green evaluating, BGl 105 biba with hha, as per aid, pt given meds to sleep overnight (unknown what) then is drowsy in am and difficult to rouse, frequent for her but comes to the er for same, P3earl , aid states she was told to come to the er "as is usual for being drowsy" as per ems son sleeps in bed next to pt, but  couldn't given any hx to ems , Dr Green evaluating, BGl 105, pt eyes open to sternal rub

## 2025-07-25 DIAGNOSIS — Z88.0 ALLERGY STATUS TO PENICILLIN: ICD-10-CM

## 2025-07-25 DIAGNOSIS — R41.82 ALTERED MENTAL STATUS, UNSPECIFIED: ICD-10-CM

## 2025-07-25 DIAGNOSIS — I42.9 CARDIOMYOPATHY, UNSPECIFIED: ICD-10-CM

## 2025-07-25 DIAGNOSIS — G40.909 EPILEPSY, UNSPECIFIED, NOT INTRACTABLE, WITHOUT STATUS EPILEPTICUS: ICD-10-CM

## 2025-07-25 DIAGNOSIS — Z88.6 ALLERGY STATUS TO ANALGESIC AGENT: ICD-10-CM

## 2025-07-25 DIAGNOSIS — E03.9 HYPOTHYROIDISM, UNSPECIFIED: ICD-10-CM

## 2025-07-25 DIAGNOSIS — Z74.01 BED CONFINEMENT STATUS: ICD-10-CM
